# Patient Record
Sex: FEMALE | Race: BLACK OR AFRICAN AMERICAN | Employment: OTHER | URBAN - METROPOLITAN AREA
[De-identification: names, ages, dates, MRNs, and addresses within clinical notes are randomized per-mention and may not be internally consistent; named-entity substitution may affect disease eponyms.]

---

## 2017-02-03 ENCOUNTER — TELEPHONE (OUTPATIENT)
Dept: FAMILY MEDICINE CLINIC | Age: 77
End: 2017-02-03

## 2017-02-03 DIAGNOSIS — Z72.0 TOBACCO ABUSE: Primary | ICD-10-CM

## 2017-02-03 RX ORDER — NICOTINE 21 MG/24HR
1 PATCH, TRANSDERMAL 24 HOURS TRANSDERMAL EVERY 24 HOURS
Qty: 30 PATCH | Refills: 0 | Status: SHIPPED | OUTPATIENT
Start: 2017-02-03 | End: 2017-06-27

## 2017-02-07 ENCOUNTER — HOSPITAL ENCOUNTER (OUTPATIENT)
Dept: WOMENS IMAGING | Age: 77
Discharge: HOME | End: 2017-02-07
Attending: INTERNAL MEDICINE | Admitting: INTERNAL MEDICINE

## 2017-02-07 DIAGNOSIS — Z12.31 SCREENING MAMMOGRAM, ENCOUNTER FOR: ICD-10-CM

## 2017-02-07 DIAGNOSIS — C50.211 MALIGNANT NEOPLASM OF UPPER-INNER QUADRANT OF RIGHT FEMALE BREAST (HCC): ICD-10-CM

## 2017-02-13 ENCOUNTER — HOSPITAL ENCOUNTER (OUTPATIENT)
Dept: ULTRASOUND IMAGING | Age: 77
Discharge: HOME | End: 2017-02-13
Attending: INTERNAL MEDICINE | Admitting: INTERNAL MEDICINE

## 2017-02-13 DIAGNOSIS — R92.8 ABNORMAL MAMMOGRAM: ICD-10-CM

## 2017-06-22 ENCOUNTER — TELEPHONE (OUTPATIENT)
Dept: FAMILY MEDICINE CLINIC | Age: 77
End: 2017-06-22

## 2017-06-27 ENCOUNTER — OFFICE VISIT (OUTPATIENT)
Dept: FAMILY MEDICINE CLINIC | Age: 77
End: 2017-06-27

## 2017-06-27 VITALS
WEIGHT: 109.4 LBS | DIASTOLIC BLOOD PRESSURE: 88 MMHG | HEIGHT: 66 IN | HEART RATE: 76 BPM | SYSTOLIC BLOOD PRESSURE: 132 MMHG | BODY MASS INDEX: 17.58 KG/M2

## 2017-06-27 DIAGNOSIS — I10 ESSENTIAL HYPERTENSION: ICD-10-CM

## 2017-06-27 DIAGNOSIS — F10.10 ALCOHOL ABUSE: ICD-10-CM

## 2017-06-27 DIAGNOSIS — J44.9 MODERATE COPD (CHRONIC OBSTRUCTIVE PULMONARY DISEASE) (HCC): Primary | ICD-10-CM

## 2017-06-27 DIAGNOSIS — R63.6 UNDERWEIGHT DUE TO INADEQUATE CALORIC INTAKE: ICD-10-CM

## 2017-06-27 DIAGNOSIS — R26.81 UNSTEADY GAIT: ICD-10-CM

## 2017-06-27 DIAGNOSIS — Z23 NEED FOR SHINGLES VACCINE: ICD-10-CM

## 2017-06-27 DIAGNOSIS — Z91.81 RISK FOR FALLS: ICD-10-CM

## 2017-06-27 DIAGNOSIS — I73.9 PERIPHERAL VASCULAR DISEASE (HCC): ICD-10-CM

## 2017-06-27 PROCEDURE — 99214 OFFICE O/P EST MOD 30 MIN: CPT | Performed by: FAMILY MEDICINE

## 2017-06-27 RX ORDER — ATORVASTATIN CALCIUM 80 MG/1
80 TABLET, FILM COATED ORAL DAILY
Qty: 90 TABLET | Refills: 1 | Status: SHIPPED | OUTPATIENT
Start: 2017-06-27 | End: 2017-12-20 | Stop reason: SDUPTHER

## 2017-06-27 RX ORDER — CLOPIDOGREL BISULFATE 75 MG/1
75 TABLET ORAL DAILY
Qty: 90 TABLET | Refills: 1 | Status: SHIPPED | OUTPATIENT
Start: 2017-06-27 | End: 2017-12-20 | Stop reason: SDUPTHER

## 2017-06-27 RX ORDER — FOLIC ACID 1 MG/1
1 TABLET ORAL DAILY
Qty: 90 TABLET | Refills: 3 | Status: SHIPPED | OUTPATIENT
Start: 2017-06-27 | End: 2018-05-23 | Stop reason: SDUPTHER

## 2017-06-27 RX ORDER — M-VIT,TX,IRON,MINS/CALC/FOLIC 27MG-0.4MG
1 TABLET ORAL DAILY
Qty: 90 TABLET | Refills: 11 | Status: SHIPPED | OUTPATIENT
Start: 2017-06-27 | End: 2018-07-31 | Stop reason: SDUPTHER

## 2017-06-27 RX ORDER — THIAMINE MONONITRATE (VIT B1) 100 MG
100 TABLET ORAL DAILY
Qty: 90 TABLET | Refills: 3 | Status: SHIPPED | OUTPATIENT
Start: 2017-06-27 | End: 2017-12-20 | Stop reason: SDUPTHER

## 2017-06-27 ASSESSMENT — ENCOUNTER SYMPTOMS: SHORTNESS OF BREATH: 1

## 2017-06-27 ASSESSMENT — COPD QUESTIONNAIRES: COPD: 1

## 2017-10-06 ENCOUNTER — HOSPITAL ENCOUNTER (OUTPATIENT)
Dept: ULTRASOUND IMAGING | Age: 77
Discharge: HOME | End: 2017-10-06
Attending: INTERNAL MEDICINE | Admitting: INTERNAL MEDICINE

## 2017-10-06 DIAGNOSIS — C50.211 MALIGNANT NEOPLASM OF UPPER-INNER QUADRANT OF RIGHT FEMALE BREAST (HCC): ICD-10-CM

## 2017-10-20 DIAGNOSIS — J44.9 MODERATE COPD (CHRONIC OBSTRUCTIVE PULMONARY DISEASE) (HCC): ICD-10-CM

## 2017-12-04 ENCOUNTER — TELEPHONE (OUTPATIENT)
Dept: FAMILY MEDICINE CLINIC | Age: 77
End: 2017-12-04

## 2017-12-06 NOTE — TELEPHONE ENCOUNTER
Patient left message on Voice mail inquiring statis of letter stating she has rods in left shoulder and stint

## 2017-12-20 ENCOUNTER — OFFICE VISIT (OUTPATIENT)
Dept: FAMILY MEDICINE CLINIC | Age: 77
End: 2017-12-20

## 2017-12-20 VITALS
HEART RATE: 86 BPM | DIASTOLIC BLOOD PRESSURE: 72 MMHG | SYSTOLIC BLOOD PRESSURE: 118 MMHG | WEIGHT: 107 LBS | RESPIRATION RATE: 14 BRPM | BODY MASS INDEX: 17.27 KG/M2

## 2017-12-20 DIAGNOSIS — Z72.0 TOBACCO ABUSE: ICD-10-CM

## 2017-12-20 DIAGNOSIS — R63.6 UNDERWEIGHT: ICD-10-CM

## 2017-12-20 DIAGNOSIS — J44.9 MODERATE COPD (CHRONIC OBSTRUCTIVE PULMONARY DISEASE) (HCC): Primary | ICD-10-CM

## 2017-12-20 DIAGNOSIS — F10.10 ALCOHOL ABUSE: ICD-10-CM

## 2017-12-20 DIAGNOSIS — I73.9 PERIPHERAL VASCULAR DISEASE (HCC): ICD-10-CM

## 2017-12-20 PROBLEM — Z80.3 FAMILY HISTORY OF BREAST CANCER: Status: ACTIVE | Noted: 2017-12-20

## 2017-12-20 PROBLEM — Z80.3 FAMILY HISTORY OF BREAST CANCER: Status: RESOLVED | Noted: 2017-12-20 | Resolved: 2017-12-20

## 2017-12-20 PROBLEM — Z85.3 HISTORY OF BREAST CANCER: Status: ACTIVE | Noted: 2017-12-20

## 2017-12-20 PROCEDURE — G8484 FLU IMMUNIZE NO ADMIN: HCPCS | Performed by: FAMILY MEDICINE

## 2017-12-20 PROCEDURE — 3023F SPIROM DOC REV: CPT | Performed by: FAMILY MEDICINE

## 2017-12-20 PROCEDURE — 1090F PRES/ABSN URINE INCON ASSESS: CPT | Performed by: FAMILY MEDICINE

## 2017-12-20 PROCEDURE — G8427 DOCREV CUR MEDS BY ELIG CLIN: HCPCS | Performed by: FAMILY MEDICINE

## 2017-12-20 PROCEDURE — 1036F TOBACCO NON-USER: CPT | Performed by: FAMILY MEDICINE

## 2017-12-20 PROCEDURE — G8418 CALC BMI BLW LOW PARAM F/U: HCPCS | Performed by: FAMILY MEDICINE

## 2017-12-20 PROCEDURE — 4040F PNEUMOC VAC/ADMIN/RCVD: CPT | Performed by: FAMILY MEDICINE

## 2017-12-20 PROCEDURE — G8926 SPIRO NO PERF OR DOC: HCPCS | Performed by: FAMILY MEDICINE

## 2017-12-20 PROCEDURE — 99214 OFFICE O/P EST MOD 30 MIN: CPT | Performed by: FAMILY MEDICINE

## 2017-12-20 PROCEDURE — G8399 PT W/DXA RESULTS DOCUMENT: HCPCS | Performed by: FAMILY MEDICINE

## 2017-12-20 PROCEDURE — 1123F ACP DISCUSS/DSCN MKR DOCD: CPT | Performed by: FAMILY MEDICINE

## 2017-12-20 RX ORDER — CLOPIDOGREL BISULFATE 75 MG/1
75 TABLET ORAL DAILY
Qty: 90 TABLET | Refills: 1 | Status: SHIPPED | OUTPATIENT
Start: 2017-12-20 | End: 2018-05-23 | Stop reason: SDUPTHER

## 2017-12-20 RX ORDER — THIAMINE MONONITRATE (VIT B1) 100 MG
100 TABLET ORAL DAILY
Qty: 90 TABLET | Refills: 3 | Status: SHIPPED | OUTPATIENT
Start: 2017-12-20 | End: 2018-11-14 | Stop reason: SDUPTHER

## 2017-12-20 RX ORDER — FLUTICASONE PROPIONATE 220 UG/1
2 AEROSOL, METERED RESPIRATORY (INHALATION) 2 TIMES DAILY
Qty: 3 INHALER | Refills: 3 | Status: SHIPPED | OUTPATIENT
Start: 2017-12-20 | End: 2018-11-14 | Stop reason: SDUPTHER

## 2017-12-20 RX ORDER — ATORVASTATIN CALCIUM 80 MG/1
80 TABLET, FILM COATED ORAL DAILY
Qty: 90 TABLET | Refills: 1 | Status: SHIPPED | OUTPATIENT
Start: 2017-12-20 | End: 2018-05-23 | Stop reason: SDUPTHER

## 2017-12-20 RX ORDER — INFLUENZA A VIRUS A/MICHIGAN/45/2015 X-275 (H1N1) ANTIGEN (FORMALDEHYDE INACTIVATED), INFLUENZA A VIRUS A/SINGAPORE/INFIMH-16-0019/2016 IVR-186 (H3N2) ANTIGEN (FORMALDEHYDE INACTIVATED), AND INFLUENZA B VIRUS B/MARYLAND/15/2016 BX-69A (A B/COLORADO/6/2017-LIKE VIRUS) ANTIGEN (FORMALDEHYDE INACTIVATED) 60; 60; 60 UG/.5ML; UG/.5ML; UG/.5ML
INJECTION, SUSPENSION INTRAMUSCULAR
Refills: 0 | COMMUNITY
Start: 2017-11-14 | End: 2018-11-15 | Stop reason: ALTCHOICE

## 2017-12-20 RX ORDER — NICOTINE 21 MG/24HR
1 PATCH, TRANSDERMAL 24 HOURS TRANSDERMAL EVERY 24 HOURS
Qty: 30 PATCH | Refills: 2 | Status: SHIPPED | OUTPATIENT
Start: 2017-12-20 | End: 2019-05-14

## 2017-12-20 ASSESSMENT — ENCOUNTER SYMPTOMS: SHORTNESS OF BREATH: 1

## 2017-12-20 ASSESSMENT — COPD QUESTIONNAIRES: COPD: 1

## 2017-12-20 NOTE — PATIENT INSTRUCTIONS
Patient Education        Stopping Smoking: Care Instructions  Your Care Instructions    Cigarette smokers crave the nicotine in cigarettes. Giving it up is much harder than simply changing a habit. Your body has to stop craving the nicotine. It is hard to quit, but you can do it. There are many tools that people use to quit smoking. You may find that combining tools works best for you. There are several steps to quitting. First you get ready to quit. Then you get support to help you. After that, you learn new skills and behaviors to become a nonsmoker. For many people, a necessary step is getting and using medicine. Your doctor will help you set up the plan that best meets your needs. You may want to attend a smoking cessation program to help you quit smoking. When you choose a program, look for one that has proven success. Ask your doctor for ideas. You will greatly increase your chances of success if you take medicine as well as get counseling or join a cessation program.  Some of the changes you feel when you first quit tobacco are uncomfortable. Your body will miss the nicotine at first, and you may feel short-tempered and grumpy. You may have trouble sleeping or concentrating. Medicine can help you deal with these symptoms. You may struggle with changing your smoking habits and rituals. The last step is the tricky one: Be prepared for the smoking urge to continue for a time. This is a lot to deal with, but keep at it. You will feel better. Follow-up care is a key part of your treatment and safety. Be sure to make and go to all appointments, and call your doctor if you are having problems. It's also a good idea to know your test results and keep a list of the medicines you take. How can you care for yourself at home? · Ask your family, friends, and coworkers for support. You have a better chance of quitting if you have help and support.   · Join a support group, such as Nicotine Anonymous, for people who get mad at yourself if you smoke again. Make a list of things you learned and think about when you want to try again, such as next week, next month, or next year. Where can you learn more? Go to https://Easy Voy[employer].Houzz. org and sign in to your [facility] account. Enter P612 in the MyDROBE box to learn more about \"Stopping Smoking: Care Instructions. \"     If you do not have an account, please click on the \"Sign Up Now\" link. Current as of: March 20, 2017  Content Version: 11.4  © 8390-7952 [employer], [employer]. Care instructions adapted under license by [facility] ([facility]). If you have questions about a medical condition or this instruction, always ask your healthcare professional. [address] any warranty or liability for your use of this information.

## 2018-02-12 ENCOUNTER — HOSPITAL ENCOUNTER (OUTPATIENT)
Dept: WOMENS IMAGING | Age: 78
Discharge: HOME | End: 2018-02-12
Attending: INTERNAL MEDICINE | Admitting: INTERNAL MEDICINE

## 2018-02-12 DIAGNOSIS — Z78.0 ASYMPTOMATIC AGE-RELATED POSTMENOPAUSAL STATE: ICD-10-CM

## 2018-02-12 DIAGNOSIS — C50.919 MALIGNANT NEOPLASM OF FEMALE BREAST, UNSPECIFIED ESTROGEN RECEPTOR STATUS, UNSPECIFIED LATERALITY, UNSPECIFIED SITE OF BREAST (HCC): ICD-10-CM

## 2018-04-16 ENCOUNTER — TELEPHONE (OUTPATIENT)
Dept: FAMILY MEDICINE CLINIC | Age: 78
End: 2018-04-16

## 2018-05-23 ENCOUNTER — OFFICE VISIT (OUTPATIENT)
Dept: FAMILY MEDICINE CLINIC | Age: 78
End: 2018-05-23

## 2018-05-23 VITALS
DIASTOLIC BLOOD PRESSURE: 78 MMHG | SYSTOLIC BLOOD PRESSURE: 100 MMHG | BODY MASS INDEX: 17.16 KG/M2 | WEIGHT: 106.8 LBS | HEIGHT: 66 IN | HEART RATE: 89 BPM

## 2018-05-23 DIAGNOSIS — F10.10 ALCOHOL ABUSE: ICD-10-CM

## 2018-05-23 DIAGNOSIS — R26.81 UNSTEADY GAIT: ICD-10-CM

## 2018-05-23 DIAGNOSIS — J44.9 MODERATE COPD (CHRONIC OBSTRUCTIVE PULMONARY DISEASE) (HCC): ICD-10-CM

## 2018-05-23 DIAGNOSIS — I73.9 PERIPHERAL VASCULAR DISEASE (HCC): Primary | ICD-10-CM

## 2018-05-23 PROCEDURE — 1090F PRES/ABSN URINE INCON ASSESS: CPT | Performed by: FAMILY MEDICINE

## 2018-05-23 PROCEDURE — 4040F PNEUMOC VAC/ADMIN/RCVD: CPT | Performed by: FAMILY MEDICINE

## 2018-05-23 PROCEDURE — G8418 CALC BMI BLW LOW PARAM F/U: HCPCS | Performed by: FAMILY MEDICINE

## 2018-05-23 PROCEDURE — G8926 SPIRO NO PERF OR DOC: HCPCS | Performed by: FAMILY MEDICINE

## 2018-05-23 PROCEDURE — 1123F ACP DISCUSS/DSCN MKR DOCD: CPT | Performed by: FAMILY MEDICINE

## 2018-05-23 PROCEDURE — 99214 OFFICE O/P EST MOD 30 MIN: CPT | Performed by: FAMILY MEDICINE

## 2018-05-23 PROCEDURE — G8399 PT W/DXA RESULTS DOCUMENT: HCPCS | Performed by: FAMILY MEDICINE

## 2018-05-23 PROCEDURE — 1036F TOBACCO NON-USER: CPT | Performed by: FAMILY MEDICINE

## 2018-05-23 PROCEDURE — G8427 DOCREV CUR MEDS BY ELIG CLIN: HCPCS | Performed by: FAMILY MEDICINE

## 2018-05-23 PROCEDURE — 3023F SPIROM DOC REV: CPT | Performed by: FAMILY MEDICINE

## 2018-05-23 RX ORDER — ATORVASTATIN CALCIUM 80 MG/1
80 TABLET, FILM COATED ORAL DAILY
Qty: 90 TABLET | Refills: 1 | Status: SHIPPED | OUTPATIENT
Start: 2018-05-23 | End: 2018-11-14 | Stop reason: SDUPTHER

## 2018-05-23 RX ORDER — FOLIC ACID 1 MG/1
1 TABLET ORAL DAILY
Qty: 90 TABLET | Refills: 3 | Status: SHIPPED | OUTPATIENT
Start: 2018-05-23 | End: 2019-05-14 | Stop reason: SDUPTHER

## 2018-05-23 RX ORDER — CLOPIDOGREL BISULFATE 75 MG/1
75 TABLET ORAL DAILY
Qty: 90 TABLET | Refills: 1 | Status: SHIPPED | OUTPATIENT
Start: 2018-05-23 | End: 2018-11-14 | Stop reason: SDUPTHER

## 2018-05-23 ASSESSMENT — PATIENT HEALTH QUESTIONNAIRE - PHQ9
SUM OF ALL RESPONSES TO PHQ QUESTIONS 1-9: 0
1. LITTLE INTEREST OR PLEASURE IN DOING THINGS: 0
SUM OF ALL RESPONSES TO PHQ9 QUESTIONS 1 & 2: 0
2. FEELING DOWN, DEPRESSED OR HOPELESS: 0

## 2018-05-24 ENCOUNTER — TELEPHONE (OUTPATIENT)
Dept: FAMILY MEDICINE CLINIC | Age: 78
End: 2018-05-24

## 2018-05-24 ASSESSMENT — ENCOUNTER SYMPTOMS: SHORTNESS OF BREATH: 1

## 2018-05-24 ASSESSMENT — COPD QUESTIONNAIRES: COPD: 1

## 2018-05-30 ENCOUNTER — HOSPITAL ENCOUNTER (OUTPATIENT)
Dept: LAB | Age: 78
Discharge: HOME | End: 2018-05-30
Attending: FAMILY MEDICINE | Admitting: FAMILY MEDICINE

## 2018-05-30 LAB
ALBUMIN SERPL-MCNC: 5 GM/DL (ref 3.4–5)
ALP BLD-CCNC: 103 IU/L (ref 40–128)
ALT SERPL-CCNC: 15 U/L (ref 10–40)
ANION GAP SERPL CALCULATED.3IONS-SCNC: 13 MMOL/L (ref 4–16)
AST SERPL-CCNC: 25 IU/L (ref 15–37)
BASOPHILS ABSOLUTE: 0 K/CU MM
BASOPHILS RELATIVE PERCENT: 0.7 % (ref 0–1)
BILIRUB SERPL-MCNC: 0.8 MG/DL (ref 0–1)
BUN BLDV-MCNC: 7 MG/DL (ref 6–23)
CALCIUM SERPL-MCNC: 10 MG/DL (ref 8.3–10.6)
CHLORIDE BLD-SCNC: 91 MMOL/L (ref 99–110)
CO2: 25 MMOL/L (ref 21–32)
CREAT SERPL-MCNC: 0.7 MG/DL (ref 0.6–1.1)
DIFFERENTIAL TYPE: ABNORMAL
EOSINOPHILS ABSOLUTE: 0.1 K/CU MM
EOSINOPHILS RELATIVE PERCENT: 2 % (ref 0–3)
GFR AFRICAN AMERICAN: >60 ML/MIN/1.73M2
GFR NON-AFRICAN AMERICAN: >60 ML/MIN/1.73M2
GLUCOSE BLD-MCNC: 84 MG/DL (ref 70–99)
HCT VFR BLD CALC: 49.4 % (ref 37–47)
HEMOGLOBIN: 16.5 GM/DL (ref 12.5–16)
IMMATURE NEUTROPHIL %: 0.4 % (ref 0–0.43)
LYMPHOCYTES ABSOLUTE: 1.2 K/CU MM
LYMPHOCYTES RELATIVE PERCENT: 26.7 % (ref 24–44)
MCH RBC QN AUTO: 31.6 PG (ref 27–31)
MCHC RBC AUTO-ENTMCNC: 33.4 % (ref 32–36)
MCV RBC AUTO: 94.6 FL (ref 78–100)
MONOCYTES ABSOLUTE: 0.6 K/CU MM
MONOCYTES RELATIVE PERCENT: 12.8 % (ref 0–4)
NUCLEATED RBC %: 0 %
PDW BLD-RTO: 13.9 % (ref 11.7–14.9)
PLATELET # BLD: 280 K/CU MM (ref 140–440)
PMV BLD AUTO: 8.4 FL (ref 7.5–11.1)
POTASSIUM SERPL-SCNC: 5.4 MMOL/L (ref 3.5–5.1)
RBC # BLD: 5.22 M/CU MM (ref 4.2–5.4)
SEGMENTED NEUTROPHILS ABSOLUTE COUNT: 2.7 K/CU MM
SEGMENTED NEUTROPHILS RELATIVE PERCENT: 57.4 % (ref 36–66)
SODIUM BLD-SCNC: 129 MMOL/L (ref 135–145)
TOTAL IMMATURE NEUTOROPHIL: 0.02 K/CU MM
TOTAL NUCLEATED RBC: 0 K/CU MM
TOTAL PROTEIN: 7.5 GM/DL (ref 6.4–8.2)
WBC # BLD: 4.6 K/CU MM (ref 4–10.5)

## 2018-06-01 ENCOUNTER — TELEPHONE (OUTPATIENT)
Dept: FAMILY MEDICINE CLINIC | Age: 78
End: 2018-06-01

## 2018-06-01 DIAGNOSIS — E87.5 HIGH POTASSIUM: Primary | ICD-10-CM

## 2018-06-01 DIAGNOSIS — E87.1 HYPONATREMIA: ICD-10-CM

## 2018-06-07 ENCOUNTER — HOSPITAL ENCOUNTER (OUTPATIENT)
Dept: GENERAL RADIOLOGY | Age: 78
Discharge: HOME | End: 2018-06-07
Attending: FAMILY MEDICINE | Admitting: FAMILY MEDICINE

## 2018-06-07 LAB
BASOPHILS ABSOLUTE: 0 K/CU MM
BASOPHILS RELATIVE PERCENT: 0.6 % (ref 0–1)
DIFFERENTIAL TYPE: ABNORMAL
EOSINOPHILS ABSOLUTE: 0.1 K/CU MM
EOSINOPHILS RELATIVE PERCENT: 2.4 % (ref 0–3)
HCT VFR BLD CALC: 44.9 % (ref 37–47)
HEMOGLOBIN: 14.2 GM/DL (ref 12.5–16)
IMMATURE NEUTROPHIL %: 0.6 % (ref 0–0.43)
LYMPHOCYTES ABSOLUTE: 1.3 K/CU MM
LYMPHOCYTES RELATIVE PERCENT: 26.1 % (ref 24–44)
MCH RBC QN AUTO: 32.1 PG (ref 27–31)
MCHC RBC AUTO-ENTMCNC: 31.6 % (ref 32–36)
MCV RBC AUTO: 101.4 FL (ref 78–100)
MONOCYTES ABSOLUTE: 0.7 K/CU MM
MONOCYTES RELATIVE PERCENT: 14.2 % (ref 0–4)
NUCLEATED RBC %: 0 %
PDW BLD-RTO: 14.2 % (ref 11.7–14.9)
PLATELET # BLD: 300 K/CU MM (ref 140–440)
PMV BLD AUTO: 8.4 FL (ref 7.5–11.1)
RBC # BLD: 4.43 M/CU MM (ref 4.2–5.4)
SEGMENTED NEUTROPHILS ABSOLUTE COUNT: 2.8 K/CU MM
SEGMENTED NEUTROPHILS RELATIVE PERCENT: 56.1 % (ref 36–66)
TOTAL IMMATURE NEUTOROPHIL: 0.03 K/CU MM
TOTAL NUCLEATED RBC: 0 K/CU MM
WBC # BLD: 5.1 K/CU MM (ref 4–10.5)

## 2018-07-30 ENCOUNTER — TELEPHONE (OUTPATIENT)
Dept: FAMILY MEDICINE CLINIC | Age: 78
End: 2018-07-30

## 2018-07-30 NOTE — TELEPHONE ENCOUNTER
Patient requesting refill Multivitamin Tablets [facility] to [facility]

## 2018-07-31 DIAGNOSIS — F10.10 ALCOHOL ABUSE: ICD-10-CM

## 2018-07-31 RX ORDER — M-VIT,TX,IRON,MINS/CALC/FOLIC 27MG-0.4MG
1 TABLET ORAL DAILY
Qty: 90 TABLET | Refills: 3 | Status: SHIPPED | OUTPATIENT
Start: 2018-07-31 | End: 2018-11-14 | Stop reason: SDUPTHER

## 2018-08-15 ENCOUNTER — HOSPITAL ENCOUNTER (OUTPATIENT)
Dept: OTHER | Age: 78
Discharge: HOME | End: 2018-08-15
Attending: INTERNAL MEDICINE | Admitting: INTERNAL MEDICINE

## 2018-08-15 LAB
ALBUMIN SERPL-MCNC: 4.7 GM/DL (ref 3.4–5)
ALP BLD-CCNC: 91 IU/L (ref 40–129)
ALT SERPL-CCNC: 14 U/L (ref 10–40)
ANION GAP SERPL CALCULATED.3IONS-SCNC: 16 MMOL/L (ref 4–16)
AST SERPL-CCNC: 21 IU/L (ref 15–37)
BILIRUB SERPL-MCNC: 0.5 MG/DL (ref 0–1)
BUN BLDV-MCNC: 6 MG/DL (ref 6–23)
CALCIUM SERPL-MCNC: 10.3 MG/DL (ref 8.3–10.6)
CHLORIDE BLD-SCNC: 94 MMOL/L (ref 99–110)
CO2: 24 MMOL/L (ref 21–32)
CREAT SERPL-MCNC: 0.7 MG/DL (ref 0.6–1.1)
GFR AFRICAN AMERICAN: >60 ML/MIN/1.73M2
GFR NON-AFRICAN AMERICAN: >60 ML/MIN/1.73M2
GLUCOSE BLD-MCNC: 62 MG/DL (ref 70–99)
POTASSIUM SERPL-SCNC: 4.9 MMOL/L (ref 3.5–5.1)
SODIUM BLD-SCNC: 134 MMOL/L (ref 135–145)
TOTAL PROTEIN: 6.9 GM/DL (ref 6.4–8.2)

## 2018-10-02 ENCOUNTER — NURSE ONLY (OUTPATIENT)
Dept: FAMILY MEDICINE CLINIC | Age: 78
End: 2018-10-02
Payer: COMMERCIAL

## 2018-10-02 DIAGNOSIS — Z23 NEED FOR INFLUENZA VACCINATION: Primary | ICD-10-CM

## 2018-10-02 PROCEDURE — G0008 ADMIN INFLUENZA VIRUS VAC: HCPCS | Performed by: FAMILY MEDICINE

## 2018-10-02 PROCEDURE — 90662 IIV NO PRSV INCREASED AG IM: CPT | Performed by: FAMILY MEDICINE

## 2018-11-14 ENCOUNTER — OFFICE VISIT (OUTPATIENT)
Dept: FAMILY MEDICINE CLINIC | Age: 78
End: 2018-11-14
Payer: COMMERCIAL

## 2018-11-14 VITALS
HEART RATE: 81 BPM | SYSTOLIC BLOOD PRESSURE: 128 MMHG | WEIGHT: 96 LBS | HEIGHT: 64 IN | DIASTOLIC BLOOD PRESSURE: 80 MMHG | BODY MASS INDEX: 16.39 KG/M2

## 2018-11-14 DIAGNOSIS — R26.9 ABNORMAL GAIT: Primary | ICD-10-CM

## 2018-11-14 DIAGNOSIS — F10.10 ALCOHOL ABUSE: ICD-10-CM

## 2018-11-14 DIAGNOSIS — I73.9 PERIPHERAL VASCULAR DISEASE (HCC): ICD-10-CM

## 2018-11-14 DIAGNOSIS — J44.9 MODERATE COPD (CHRONIC OBSTRUCTIVE PULMONARY DISEASE) (HCC): ICD-10-CM

## 2018-11-14 PROCEDURE — G8926 SPIRO NO PERF OR DOC: HCPCS | Performed by: FAMILY MEDICINE

## 2018-11-14 PROCEDURE — G8427 DOCREV CUR MEDS BY ELIG CLIN: HCPCS | Performed by: FAMILY MEDICINE

## 2018-11-14 PROCEDURE — 1123F ACP DISCUSS/DSCN MKR DOCD: CPT | Performed by: FAMILY MEDICINE

## 2018-11-14 PROCEDURE — 4040F PNEUMOC VAC/ADMIN/RCVD: CPT | Performed by: FAMILY MEDICINE

## 2018-11-14 PROCEDURE — G8399 PT W/DXA RESULTS DOCUMENT: HCPCS | Performed by: FAMILY MEDICINE

## 2018-11-14 PROCEDURE — 99213 OFFICE O/P EST LOW 20 MIN: CPT | Performed by: FAMILY MEDICINE

## 2018-11-14 PROCEDURE — 1090F PRES/ABSN URINE INCON ASSESS: CPT | Performed by: FAMILY MEDICINE

## 2018-11-14 PROCEDURE — 1101F PT FALLS ASSESS-DOCD LE1/YR: CPT | Performed by: FAMILY MEDICINE

## 2018-11-14 PROCEDURE — 3023F SPIROM DOC REV: CPT | Performed by: FAMILY MEDICINE

## 2018-11-14 PROCEDURE — G8482 FLU IMMUNIZE ORDER/ADMIN: HCPCS | Performed by: FAMILY MEDICINE

## 2018-11-14 PROCEDURE — 1036F TOBACCO NON-USER: CPT | Performed by: FAMILY MEDICINE

## 2018-11-14 PROCEDURE — G8419 CALC BMI OUT NRM PARAM NOF/U: HCPCS | Performed by: FAMILY MEDICINE

## 2018-11-14 RX ORDER — M-VIT,TX,IRON,MINS/CALC/FOLIC 27MG-0.4MG
1 TABLET ORAL DAILY
Qty: 90 TABLET | Refills: 3 | Status: SHIPPED | OUTPATIENT
Start: 2018-11-14 | End: 2020-11-09 | Stop reason: SDUPTHER

## 2018-11-14 RX ORDER — FLUTICASONE PROPIONATE 220 UG/1
2 AEROSOL, METERED RESPIRATORY (INHALATION) 2 TIMES DAILY
Qty: 3 INHALER | Refills: 3 | Status: SHIPPED | OUTPATIENT
Start: 2018-11-14 | End: 2019-04-23 | Stop reason: ALTCHOICE

## 2018-11-14 RX ORDER — THIAMINE MONONITRATE (VIT B1) 100 MG
100 TABLET ORAL DAILY
Qty: 90 TABLET | Refills: 3 | Status: SHIPPED | OUTPATIENT
Start: 2018-11-14 | End: 2019-11-05 | Stop reason: SDUPTHER

## 2018-11-14 RX ORDER — CLOPIDOGREL BISULFATE 75 MG/1
75 TABLET ORAL DAILY
Qty: 90 TABLET | Refills: 1 | Status: SHIPPED | OUTPATIENT
Start: 2018-11-14 | End: 2019-05-14 | Stop reason: SDUPTHER

## 2018-11-14 RX ORDER — ATORVASTATIN CALCIUM 80 MG/1
80 TABLET, FILM COATED ORAL DAILY
Qty: 90 TABLET | Refills: 1 | Status: SHIPPED | OUTPATIENT
Start: 2018-11-14 | End: 2019-05-14 | Stop reason: SDUPTHER

## 2018-11-15 ASSESSMENT — COPD QUESTIONNAIRES: COPD: 1

## 2018-11-15 ASSESSMENT — ENCOUNTER SYMPTOMS
SHORTNESS OF BREATH: 0
CHEST TIGHTNESS: 0

## 2019-04-01 ENCOUNTER — OFFICE VISIT (OUTPATIENT)
Dept: FAMILY MEDICINE CLINIC | Age: 79
End: 2019-04-01
Payer: COMMERCIAL

## 2019-04-01 VITALS
DIASTOLIC BLOOD PRESSURE: 80 MMHG | HEIGHT: 64 IN | BODY MASS INDEX: 16.18 KG/M2 | SYSTOLIC BLOOD PRESSURE: 100 MMHG | WEIGHT: 94.8 LBS | HEART RATE: 80 BPM | TEMPERATURE: 97.8 F

## 2019-04-01 DIAGNOSIS — J20.9 ACUTE BRONCHITIS, UNSPECIFIED ORGANISM: Primary | ICD-10-CM

## 2019-04-01 PROCEDURE — 1090F PRES/ABSN URINE INCON ASSESS: CPT | Performed by: FAMILY MEDICINE

## 2019-04-01 PROCEDURE — G8427 DOCREV CUR MEDS BY ELIG CLIN: HCPCS | Performed by: FAMILY MEDICINE

## 2019-04-01 PROCEDURE — 4040F PNEUMOC VAC/ADMIN/RCVD: CPT | Performed by: FAMILY MEDICINE

## 2019-04-01 PROCEDURE — G8419 CALC BMI OUT NRM PARAM NOF/U: HCPCS | Performed by: FAMILY MEDICINE

## 2019-04-01 PROCEDURE — G8399 PT W/DXA RESULTS DOCUMENT: HCPCS | Performed by: FAMILY MEDICINE

## 2019-04-01 PROCEDURE — 1036F TOBACCO NON-USER: CPT | Performed by: FAMILY MEDICINE

## 2019-04-01 PROCEDURE — 99213 OFFICE O/P EST LOW 20 MIN: CPT | Performed by: FAMILY MEDICINE

## 2019-04-01 PROCEDURE — 1123F ACP DISCUSS/DSCN MKR DOCD: CPT | Performed by: FAMILY MEDICINE

## 2019-04-01 RX ORDER — AZITHROMYCIN 250 MG/1
250 TABLET, FILM COATED ORAL SEE ADMIN INSTRUCTIONS
Qty: 6 TABLET | Refills: 0 | Status: SHIPPED | OUTPATIENT
Start: 2019-04-01 | End: 2019-04-06

## 2019-04-01 NOTE — PROGRESS NOTES
Brother     Heart Attack Brother          age 76    Heart Disease Brother     Early Death Brother     Other Sister         \"Both Legs Amputated\"    Kidney Disease Sister         \"Kidney Problems\"    Early Death Brother         Suicide- hung himself    Early Death Brother         Murdered in his 42's    Early Death Son 1         in a fire    Early Death Mother 61    Vision Loss Sister         \"Eye Problems\"    Other Daughter         \"Lung Problems\"       Current Outpatient Medications on File Prior to Visit   Medication Sig Dispense Refill    Multiple Vitamins-Minerals (THERAPEUTIC MULTIVITAMIN-MINERALS) tablet Take 1 tablet by mouth daily 90 tablet 3    atorvastatin (LIPITOR) 80 MG tablet Take 1 tablet by mouth daily 90 tablet 1    clopidogrel (PLAVIX) 75 MG tablet Take 1 tablet by mouth daily 90 tablet 1    albuterol-ipratropium (COMBIVENT RESPIMAT)  MCG/ACT AERS inhaler Inhale 1 puff into the lungs every 6 hours as needed for Wheezing 4 Inhaler 3    fluticasone (FLOVENT HFA) 220 MCG/ACT inhaler Inhale 2 puffs into the lungs 2 times daily 3 Inhaler 3    vitamin B-1 (THIAMINE) 100 MG tablet Take 1 tablet by mouth daily 90 tablet 3    folic acid (FOLVITE) 1 MG tablet Take 1 tablet by mouth daily 90 tablet 3    nicotine (NICODERM CQ) 14 MG/24HR Place 1 patch onto the skin every 24 hours 30 patch 2    UNABLE TO FIND Shingles vaccine 1 Units 0    calcium carbonate-vitamin D (CALCIUM 600 + D) 600-400 MG-UNIT TABS per tab Twice daily by mouth 60 tablet 11    aspirin 325 MG tablet Take 325 mg by mouth every morning. Over The Counter , Last Dose Taken 12 Due To Scheduled Procedure      OXYGEN by Nasal route nightly. Oxygen 2 liters per minute       No current facility-administered medications on file prior to visit. Objective:   Physical Exam   Constitutional: She appears well-developed and well-nourished. No distress. HENT:   Head: Normocephalic and atraumatic. Right Ear: Hearing normal.   Left Ear: Hearing normal.   Nose: Nose normal. No mucosal edema, rhinorrhea, nose lacerations, sinus tenderness or nasal deformity. Right sinus exhibits no maxillary sinus tenderness and no frontal sinus tenderness. Left sinus exhibits no maxillary sinus tenderness and no frontal sinus tenderness. Mouth/Throat: Oropharynx is clear and moist and mucous membranes are normal. No oropharyngeal exudate, posterior oropharyngeal edema or posterior oropharyngeal erythema. Tympanic membranes blocked by bilateral cerumen occlusion   Eyes: Conjunctivae are normal.   Neck: No tracheal deviation present. No thyromegaly present. Cardiovascular: Normal rate, regular rhythm, S1 normal, S2 normal and normal heart sounds. Exam reveals no gallop and no friction rub. Pulmonary/Chest: No respiratory distress. She has no wheezes. She has no rales. Lymphadenopathy:        Head (right side): No submental, no submandibular and no posterior auricular adenopathy present. Head (left side): No submental, no submandibular and no posterior auricular adenopathy present. Right cervical: No superficial cervical, no deep cervical and no posterior cervical adenopathy present. Left cervical: No superficial cervical, no deep cervical and no posterior cervical adenopathy present. Skin: Skin is warm, dry and intact. Psychiatric: She has a normal mood and affect. Her behavior is normal.   Nursing note and vitals reviewed. Body mass index is 16.27 kg/m². Wt Readings from Last 3 Encounters:   04/01/19 94 lb 12.8 oz (43 kg)   11/14/18 96 lb (43.5 kg)   08/16/18 102 lb (46.3 kg)     BP Readings from Last 3 Encounters:   04/01/19 100/80   11/14/18 128/80   08/16/18 116/70          No results found for this visit on 04/01/19. Assessment:       Diagnosis Orders   1.  Acute bronchitis, unspecified organism  azithromycin (ZITHROMAX) 250 MG tablet           Plan:         Lots of

## 2019-04-23 DIAGNOSIS — J44.9 MODERATE COPD (CHRONIC OBSTRUCTIVE PULMONARY DISEASE) (HCC): Primary | ICD-10-CM

## 2019-04-23 NOTE — TELEPHONE ENCOUNTER
Patients granddaughter stated that patient is using her inhalers more frequent due to not being able to breath. Patient is out for flovent and combivent respimat inhalers and not able to fill due to being too early. Patients granddaughter stated patient is not using correctly and not getting full dosages. Would patient benifit with an aero chamber? Patients grand daughter feels she would and can use. Do you have any other suggestions. And can you call in another inhaler. Please advise.

## 2019-04-23 NOTE — PROGRESS NOTES
I've sent the flovent discus to replace the flovent inhaler. This should help. Will send in spacer as well.     Check to see if pharmacy can do early fill on the combivent--and can a spacer be added on

## 2019-04-24 DIAGNOSIS — J44.9 MODERATE COPD (CHRONIC OBSTRUCTIVE PULMONARY DISEASE) (HCC): ICD-10-CM

## 2019-04-24 NOTE — TELEPHONE ENCOUNTER
Per Dr Maier Bound  Progress Notes        I've sent the flovent discus to replace the flovent inhaler. This should help. Will send in spacer as well.     Check to see if pharmacy can do early fill on the combivent--and can a spacer be added on              Spoke to pharmacist, notified that the  flovent discus is to replace the flovent inhaler. Pharmacist stated the space can be added on to Combivent and will try to fill on early.

## 2019-05-14 ENCOUNTER — OFFICE VISIT (OUTPATIENT)
Dept: FAMILY MEDICINE CLINIC | Age: 79
End: 2019-05-14
Payer: COMMERCIAL

## 2019-05-14 VITALS
HEIGHT: 64 IN | SYSTOLIC BLOOD PRESSURE: 120 MMHG | DIASTOLIC BLOOD PRESSURE: 80 MMHG | OXYGEN SATURATION: 99 % | WEIGHT: 93.8 LBS | BODY MASS INDEX: 16.01 KG/M2 | HEART RATE: 55 BPM

## 2019-05-14 DIAGNOSIS — Z72.0 TOBACCO ABUSE: ICD-10-CM

## 2019-05-14 DIAGNOSIS — F10.10 ALCOHOL ABUSE: ICD-10-CM

## 2019-05-14 DIAGNOSIS — J44.9 MODERATE COPD (CHRONIC OBSTRUCTIVE PULMONARY DISEASE) (HCC): Primary | ICD-10-CM

## 2019-05-14 DIAGNOSIS — R64 CACHEXIA (HCC): ICD-10-CM

## 2019-05-14 DIAGNOSIS — I73.9 PERIPHERAL VASCULAR DISEASE (HCC): ICD-10-CM

## 2019-05-14 PROCEDURE — G8419 CALC BMI OUT NRM PARAM NOF/U: HCPCS | Performed by: FAMILY MEDICINE

## 2019-05-14 PROCEDURE — G8399 PT W/DXA RESULTS DOCUMENT: HCPCS | Performed by: FAMILY MEDICINE

## 2019-05-14 PROCEDURE — 99214 OFFICE O/P EST MOD 30 MIN: CPT | Performed by: FAMILY MEDICINE

## 2019-05-14 PROCEDURE — 3023F SPIROM DOC REV: CPT | Performed by: FAMILY MEDICINE

## 2019-05-14 PROCEDURE — 4040F PNEUMOC VAC/ADMIN/RCVD: CPT | Performed by: FAMILY MEDICINE

## 2019-05-14 PROCEDURE — G8926 SPIRO NO PERF OR DOC: HCPCS | Performed by: FAMILY MEDICINE

## 2019-05-14 PROCEDURE — 1090F PRES/ABSN URINE INCON ASSESS: CPT | Performed by: FAMILY MEDICINE

## 2019-05-14 PROCEDURE — 4004F PT TOBACCO SCREEN RCVD TLK: CPT | Performed by: FAMILY MEDICINE

## 2019-05-14 PROCEDURE — 1123F ACP DISCUSS/DSCN MKR DOCD: CPT | Performed by: FAMILY MEDICINE

## 2019-05-14 PROCEDURE — G8427 DOCREV CUR MEDS BY ELIG CLIN: HCPCS | Performed by: FAMILY MEDICINE

## 2019-05-14 RX ORDER — CLOPIDOGREL BISULFATE 75 MG/1
75 TABLET ORAL DAILY
Qty: 90 TABLET | Refills: 1 | Status: SHIPPED | OUTPATIENT
Start: 2019-05-14 | End: 2019-11-05 | Stop reason: SDUPTHER

## 2019-05-14 RX ORDER — ATORVASTATIN CALCIUM 80 MG/1
80 TABLET, FILM COATED ORAL DAILY
Qty: 90 TABLET | Refills: 1 | Status: SHIPPED | OUTPATIENT
Start: 2019-05-14 | End: 2019-11-05 | Stop reason: SDUPTHER

## 2019-05-14 RX ORDER — FOLIC ACID 1 MG/1
1 TABLET ORAL DAILY
Qty: 90 TABLET | Refills: 3 | Status: SHIPPED | OUTPATIENT
Start: 2019-05-14 | End: 2020-07-09 | Stop reason: SDUPTHER

## 2019-05-14 ASSESSMENT — PATIENT HEALTH QUESTIONNAIRE - PHQ9
2. FEELING DOWN, DEPRESSED OR HOPELESS: 0
SUM OF ALL RESPONSES TO PHQ QUESTIONS 1-9: 0
SUM OF ALL RESPONSES TO PHQ9 QUESTIONS 1 & 2: 0
SUM OF ALL RESPONSES TO PHQ QUESTIONS 1-9: 0
1. LITTLE INTEREST OR PLEASURE IN DOING THINGS: 0

## 2019-05-14 ASSESSMENT — ENCOUNTER SYMPTOMS
BLOOD IN STOOL: 0
SHORTNESS OF BREATH: 1
CHEST TIGHTNESS: 1
DIARRHEA: 0
CONSTIPATION: 0

## 2019-05-14 ASSESSMENT — COPD QUESTIONNAIRES: COPD: 1

## 2019-05-14 NOTE — PROGRESS NOTES
Patient ID: Lenka Amos [id number]    . Chief Complaint   Patient presents with    COPD     was mis using the Combivent inhaler         COPD   She complains of shortness of breath. This is a chronic problem. The current episode started more than 1 year ago. The problem has been unchanged. Pertinent negatives include no chest pain. Her symptoms are aggravated by strenuous activity, URI and emotional stress. Her symptoms are alleviated by rest (oxygen. ). Risk factors for lung disease include smoking/tobacco exposure. Her past medical history is significant for COPD. Hyperlipidemia   This is a chronic problem. The current episode started more than 1 year ago. Associated symptoms include shortness of breath. Pertinent negatives include no chest pain. Current antihyperlipidemic treatment includes statins. There are no compliance problems. Risk factors for coronary artery disease include a sedentary lifestyle. Alcohol Problem   Primary symptoms comment: Granddaughter continues to buy the alcohol for her. . This is a chronic problem. The problem is unchanged. Suspected agents: beer. Past treatments include nothing. Improvement on treatment: not interested in stopping drinking. PVD:  On Plavix for this. Continues to take it regularly. Has seen vascular surgeon for this. Unfortunately, continues to smoke. Granddaughter continues to buy cigarettes for her. She is been a blood. Review of Systems   Respiratory: Positive for chest tightness and shortness of breath. Cardiovascular: Negative for chest pain, palpitations and leg swelling. Gastrointestinal: Negative for blood in stool, constipation and diarrhea. Musculoskeletal: Positive for arthralgias and gait problem.        Patient Active Problem List   Diagnosis    Peripheral vascular disease ([address]    Hyperlipidemia    Alcohol abuse    Moderate COPD (chronic obstructive pulmonary disease) ([address]    Underweight    Osteoporosis    Tobacco abuse  History of breast cancer    Hyponatremia       Past Medical History:   Diagnosis Date    AAA (abdominal aortic aneurysm) (HCC)     Alcohol abuse     4 beers daily    Arthritis     Asthma     COPD (chronic obstructive pulmonary disease) (HCC)     History of breast cancer Dx     Lumpectomy Right Breast For Cancer, Had Radiation Treatments    Hx of blood clots     HX OTHER MEDICAL     Primary Care Physician Is Dr. Shasta Chavis    Hyperlipidemia     high HDL    Hypertension     Iliac artery aneurysm, bilateral ([address]     On home oxygen therapy     At Night 2 Liters Per Nasal Cannula    Osteopenia     Peripheral vascular disease (HCC) Dx in        Past Surgical History:   Procedure Laterality Date    ABDOMEN SURGERY      \"Surgery For Adhesions\"    ABDOMINAL AORTIC ANEURYSM REPAIR, ENDOVASCULAR  2012    BREAST BIOPSY  2013    benign    BREAST LUMPECTOMY      Lumpectomy Right Breast For Cancer, Had Radiation  Treatments    [facility]    DENTAL SURGERY      Teeth Extracted In Past    FEMORAL BYPASS      \"Both Legs\"    FRACTURE SURGERY  Early     Broken Left Arm    HYSTERECTOMY, TOTAL ABDOMINAL         Family History   Problem Relation Age of Onset    Colon Cancer Father     Coronary Art Dis Sister     Heart Disease Sister     High Blood Pressure Sister     High Cholesterol Sister     Coronary Art Dis Brother     Heart Attack Brother          age 76    Heart Disease Brother     Early Death Brother     Other Sister         \"Both Legs Amputated\"    Kidney Disease Sister         \"Kidney Problems\"    Early Death Brother         Suicide- hung himself    Early Death Brother         Murdered in his 42's    Early Death Son 1         in a fire    Early Death Mother 61    Vision Loss Sister         \"Eye Problems\"    Other Daughter         \"Lung Problems\"       Current Outpatient Medications on File Prior to Visit Medication Sig Dispense Refill    Multiple Vitamins-Minerals (THERAPEUTIC MULTIVITAMIN-MINERALS) tablet Take 1 tablet by mouth daily 90 tablet 3    albuterol-ipratropium (COMBIVENT RESPIMAT)  MCG/ACT AERS inhaler Inhale 1 puff into the lungs every 6 hours as needed for Wheezing 4 Inhaler 3    vitamin B-1 (THIAMINE) 100 MG tablet Take 1 tablet by mouth daily 90 tablet 3    calcium carbonate-vitamin D (CALCIUM 600 + D) 600-400 MG-UNIT TABS per tab Twice daily by mouth 60 tablet 11    aspirin 325 MG tablet Take 325 mg by mouth every morning. Over The Counter , Last Dose Taken 11-9-12 Due To Scheduled Procedure      OXYGEN by Nasal route nightly. Oxygen 2 liters per minute      Spacer/Aero Chamber Mouthpiece MISC 1 each by Does not apply route once as needed (SOB) 1 each 0    UNABLE TO FIND Shingles vaccine 1 Units 0     No current facility-administered medications on file prior to visit. Objective:     Physical Exam   Constitutional: She appears cachectic. HENT:   Head: Normocephalic and atraumatic. Neck: Neck supple. Cardiovascular: Normal rate, regular rhythm, S1 normal, S2 normal and normal heart sounds. Pulmonary/Chest: Effort normal. No respiratory distress. She has decreased breath sounds. She has no wheezes. Musculoskeletal:        Left hand: She exhibits decreased range of motion. Walks slowly and stiffly without assistance   Neurological: She is alert. Skin: Skin is warm, dry and intact. Smells of tobacco   Psychiatric: She has a normal mood and affect. Nursing note and vitals reviewed. Vitals:    05/14/19 1000   BP: 120/80   Site: Right Upper Arm   Position: Sitting   Cuff Size: Child   Pulse: 55   SpO2: 99%   Weight: 93 lb 12.8 oz (42.5 kg)   Height: 5' 4\" (1.626 m)     Body mass index is 16.1 kg/m².      Wt Readings from Last 3 Encounters:   05/14/19 93 lb 12.8 oz (42.5 kg)   04/01/19 94 lb 12.8 oz (43 kg)   11/14/18 96 lb (43.5 kg)     BP Readings from Last 3 Encounters:   05/14/19 120/80   04/01/19 100/80   11/14/18 128/80          No results found for this visit on 05/14/19. The ASCVD Risk score (Quincy Lagos et al., 2013) failed to calculate for the following reasons: The 2013 ASCVD risk score is only valid for ages 36 to 78  Lab Review   No visits with results within 6 Month(s) from this visit. Latest known visit with results is:   Hospital Outpatient Visit on 08/15/2018   Component Date Value    Sodium 08/15/2018 134*    Potassium 08/15/2018 4.9     Chloride 08/15/2018 94*    CO2 08/15/2018 24     BUN 08/15/2018 6     CREATININE 08/15/2018 0.7     Glucose 08/15/2018 62*    Calcium 08/15/2018 10.3     Alb 08/15/2018 4.7     Total Protein 08/15/2018 6.9     Total Bilirubin 08/15/2018 0.5     ALT 08/15/2018 14     AST 08/15/2018 21     Alkaline Phosphatase 08/15/2018 91     GFR Non- 08/15/2018 >60     GFR  08/15/2018 >60     Anion Gap 08/15/2018 16            Assessment:       Diagnosis Orders   1. Moderate COPD (chronic obstructive pulmonary disease) (HCC)  fluticasone propionate (FLOVENT DISKUS) 250 MCG/BLIST AEPB inhaler   2. Tobacco abuse     3. Peripheral vascular disease (HCC)  clopidogrel (PLAVIX) 75 MG tablet    atorvastatin (LIPITOR) 80 MG tablet   4. Alcohol abuse  folic acid (FOLVITE) 1 MG tablet   5. Cachexia ([address]             Plan:      Recommended to quit smoking,. Continue current medication. Pulse ox good today    Discussed the enabling activity by the granddaughter. Both are happy with the current arrangement. Granddaughter does not want to be respectful to her grandmother. Explained that patient would not have any life-threatening withdrawal to tobacco would be stopped today. However if they were to stop the alcohol, he would have to wean her down. Neither one is interested in doing this at this time. Recheck in 6 months    Continue with the vitamins.

## 2019-05-28 ENCOUNTER — HOSPITAL ENCOUNTER (EMERGENCY)
Age: 79
Discharge: HOME | End: 2019-05-28
Payer: COMMERCIAL

## 2019-05-28 VITALS
HEIGHT: 62 IN | HEART RATE: 66 BPM | TEMPERATURE: 98 F | RESPIRATION RATE: 18 BRPM | BODY MASS INDEX: 17.02 KG/M2 | SYSTOLIC BLOOD PRESSURE: 171 MMHG | OXYGEN SATURATION: 97 % | WEIGHT: 92.5 LBS | DIASTOLIC BLOOD PRESSURE: 96 MMHG

## 2019-05-28 DIAGNOSIS — H61.23 BILATERAL HEARING LOSS DUE TO CERUMEN IMPACTION: Primary | ICD-10-CM

## 2019-05-28 PROCEDURE — 6370000000 HC RX 637 (ALT 250 FOR IP): Performed by: PHYSICIAN ASSISTANT

## 2019-05-28 PROCEDURE — 4500000028 HC INTERMEDIATE PROCEDURE

## 2019-05-28 PROCEDURE — 99282 EMERGENCY DEPT VISIT SF MDM: CPT

## 2019-05-28 RX ORDER — OFLOXACIN 3 MG/ML
5 SOLUTION AURICULAR (OTIC) 2 TIMES DAILY
Qty: 5 ML | Refills: 0 | Status: SHIPPED | OUTPATIENT
Start: 2019-05-28 | End: 2019-06-07

## 2019-05-28 RX ADMIN — Medication 5 DROP: at 11:18

## 2019-05-28 ASSESSMENT — PAIN DESCRIPTION - DESCRIPTORS: DESCRIPTORS: ACHING

## 2019-05-28 ASSESSMENT — PAIN DESCRIPTION - PAIN TYPE: TYPE: ACUTE PAIN

## 2019-05-28 ASSESSMENT — PAIN DESCRIPTION - LOCATION
LOCATION: HEAD
LOCATION: HEAD;EAR

## 2019-05-28 ASSESSMENT — PAIN SCALES - GENERAL: PAINLEVEL_OUTOF10: 5

## 2019-05-28 NOTE — ED PROVIDER NOTES
eMERGENCY dEPARTMENT eNCOUnter         [address] [facility]    PCP: Deion Núñez MD    [address] [facility]    Chief Complaint   Patient presents with    Hearing Problem     ringing in her ears last night; heard a \"pop\"; this morning can't hear anything       HPI    Janessa Mayfield is a [de-identified] y.o. female who presents with bilateral ear pain and decreased hearing. HPI is provided by granddaughter who is at bedside who cares for patient. Patient reportedly began having \"ringing\" in both ears last night with decreased hearing. No preceding trauma or injury. Today she felt a \"pop\" in both ears without bloody or purulent drainage. Patient tried to irrigate her ears with water as she feels it may be a wax buildup but had no drainage. Granddaughter states patient has had gradual decline in her hearing over the last few years secondary to her age however these symptoms are new since yesterday. Granddaughter does believe symptoms are secondary to ear wax impaction, she tried calling patient's PCP for a referral to ENT however they are closed. Granddaughter brought patient to the ED for possible ear irrigation. Patient he does not utilize hearing aids/ear phones and does not frequently use Q-tips. No recent air travel sick contacts or swimming activities. No recent URI symptoms including fever chills, cough or cold. Otherwise asymptomatic for headache, dizziness lightheadedness or near syncope. Acting normal baseline mental status per granddaughter. REVIEW OF SYSTEMS    General: fevers, chills  Pulmonary: No difficulty breathing or hemoptysis  General: No fevers or syncope  GI: No vomiting or diarrhea  Neurologic: No dizziness, lightheadedness, numbness/tingling, confusion.     All other review of systems are negative  See HPI and nursing notes for additional information     PAST MEDICAL AND SURGICAL HISTORY    Past Medical History:   Diagnosis Date    AAA (abdominal aortic aneurysm) (HCC)     Alcohol abuse     4 beers daily    Arthritis     Asthma     COPD (chronic obstructive pulmonary disease) (HCC)     History of breast cancer Dx 12-06    Lumpectomy Right Breast For Cancer, Had Radiation Treatments    Hx of blood clots     HX OTHER MEDICAL     Primary Care Physician Is Dr. Wolfgang Owen    Hyperlipidemia     high HDL    Hypertension     Iliac artery aneurysm, bilateral ([address]     On home oxygen therapy     At Night 2 Liters Per Nasal Cannula    Osteopenia     Peripheral vascular disease (HCC) Dx in 5-2008     Past Surgical History:   Procedure Laterality Date    ABDOMEN SURGERY  1970's    \"Surgery For Adhesions\"    ABDOMINAL AORTIC ANEURYSM REPAIR, ENDOVASCULAR  11/13/2012    BREAST BIOPSY  5/2013    benign    BREAST LUMPECTOMY  12-06    Lumpectomy Right Breast For Cancer, Had Radiation  Treatments    COLON[place]COPY  2011, 8-12    [place]    DENTAL SURGERY      Teeth Extracted In Past    FEMORAL BYPASS  4-09    \"Both Legs\"    FRACTURE SURGERY  Early 1980's    Broken Left Arm    HYSTERECTOMY, TOTAL ABDOMINAL  1980's     CURRENT MEDICATIONS    Current Outpatient Rx   Medication Sig Dispense Refill    carbamide peroxide (DEBROX) 6.5 % otic solution Place 5 drops into both ears 2 times daily 1 Bottle 0    ofloxacin (FLOXIN) 0.3 % otic solution Place 5 drops into both ears 2 times daily for 10 days 5 mL 0    clopidogrel (PLAVIX) 75 MG tablet Take 1 tablet by mouth daily 90 tablet 1    atorvastatin (LIPITOR) 80 MG tablet Take 1 tablet by mouth daily 90 tablet 1    folic acid (FOLVITE) 1 MG tablet Take 1 tablet by mouth daily 90 tablet 3    fluticasone propionate (FLOVENT DISKUS) 250 MCG/BLIST AEPB inhaler 1 puff twice per day 1 each 5    Spacer/Aero Chamber Mouthpiece MISC 1 each by Does not apply route once as needed (SOB) 1 each 0    Multiple Vitamins-Minerals (THERAPEUTIC MULTIVITAMIN-MINERALS) tablet Take 1 tablet by mouth daily 90 tablet 3    albuterol-ipratropium (COMBIVENT RESPIMAT)  MCG/ACT AERS inhaler Inhale 1 puff into the lungs every 6 hours as needed for Wheezing 4 Inhaler 3    vitamin B-1 (THIAMINE) 100 MG tablet Take 1 tablet by mouth daily 90 tablet 3    UNABLE TO FIND Shingles vaccine 1 Units 0    calcium carbonate-vitamin D (CALCIUM 600 + D) 600-400 MG-UNIT TABS per tab Twice daily by mouth 60 tablet 11    aspirin 325 MG tablet Take 325 mg by mouth every morning. Over The Counter , Last Dose Taken 12 Due To Scheduled Procedure      OXYGEN by Nasal route nightly. Oxygen 2 liters per minute       ALLERGIES    Allergies   Allergen Reactions    Pcn [Penicillins] Other (See Comments)     \"Been so long ago, that I forgot. \"    Thiazide-Type Diuretics      low sodium     FAMILY AND SOCIAL HISTORY    Family History   Problem Relation Age of Onset    Colon Cancer Father     Coronary Art Dis Sister     Heart Disease Sister     High Blood Pressure Sister     High Cholesterol Sister     Coronary Art Dis Brother     Heart Attack Brother          age 76    Heart Disease Brother     Early Death Brother     Other Sister         \"Both Legs Amputated\"   Spangler Kidney Disease Sister         \"Kidney Problems\"    Early Death Brother         Suicide- hung himself    Early Death Brother         Murdered in his 42's    Early Death Son 1         in a fire    Early Death Mother 61    Vision Loss Sister         \"Eye Problems\"   Spangler Other Daughter         \"Lung Problems\"     Social History     Socioeconomic History    Marital status:      Spouse name: None    Number of children: None    Years of education: None    Highest education level: None   Occupational History    None   Social Needs    Financial resource strain: None    Food insecurity:     Worry: None     Inability: None    Transportation needs:     Medical: None     Non-medical: None   Tobacco Use    Smoking status: Current Every Day Smoker     Packs/day: 1.00     Years: 61.00     Pack years: 61.00     Types: Cigarettes    Smokeless tobacco: Never Used   Substance and Sexual Activity    Alcohol use: Yes     Alcohol/week: 0.0 oz     Comment: \"Beer Every Day\"    Drug use: No    Sexual activity: Never   Lifestyle    Physical activity:     Days per week: None     Minutes per session: None    Stress: None   Relationships    Social connections:     Talks on phone: None     Gets together: None     Attends [employer] service: None     Active member of club or organization: None     Attends meetings of clubs or organizations: None     Relationship status: None    Intimate partner violence:     Fear of current or ex partner: None     Emotionally abused: None     Physically abused: None     Forced sexual activity: None   Other Topics Concern    None   Social History Narrative    None       PHYSICAL EXAM    VITAL SIGNS: BP (!) 171/96   Pulse 66   Temp 98 °F (36.7 °C) (Oral)   Resp 18   Ht 5' 2\" (1.575 m)   Wt 92 lb 8 oz (42 kg)   LMP  (LMP Unknown)   SpO2 97%   BMI 16.92 kg/m²   Constitutional:  Well developed, well nourished, no acute distress. Smiling and pleasant but having difficulty hearing from both ears  HEENT:     - Normocephalic, Atraumatic   - PERRL, EOM intact. Conjunctiva normal    -  Frontal/Maxillary sinuses NONtender to percussion.   - Nasal passages patent, nonedematous      No massess.   - Oropharynx patent, tolerating oral secretions. No trismus   - No swollen lymph nodes. Ears:  - External ears normal appearing without redness or edema.   - No mastoid tenderness bilaterally. - No tenderness to palpation of the pinna or tragus bilaterally. - Bilateral EACs are impacted with yellow dried cerumen, unable to visualize the tympanic membranes bilaterally. Very narrow EACs.  Visible outer EACs are nonerythematous and nonedematous and no pain with speculum placement      Respiratory:  Lungs clear to auscultation, no wheezes/rhonchi/rales, no retractions  Cardiovascular:  Normal rate, normal rhythm, no murmurs  Musculoskeletal:  No edema   Neurologic: Awake alert and oriented, and no slurred speech  Integument:  Skin is warm and dry, no rash      ________________________________________________________________________    Procedure Note:  Ear Canal Cerumen Removal     Indication:  Bilateral external Ear Canal Cerumen impaction    Procedure:   - Procedure explained, including risks and benefits explained to the patient/granddaughter who expressed understanding. All questions were answered. Verbal consent obtained. - After placing the patient's head in the appropriate position, debrox drops were placed to both EACs. The patient's bilateral ear canals were individually irrigated with the appropriate solution of saline and hydrogen peroxide and curetted until the procedure was discontinued at the patient's request.  Small amount of yellow scaly thick cerumen was removed bilaterally however patient did begin having irritation/mild bleeding from the left EAC. Still unable to visualize the TMs bilaterally. At this point, unsuccessful complete removal of cerumen and given patient's pain, procedure was stopped. The patient was referred to an ENT specialist for further management. The patient tolerated the procedure well. Complications: None    ________________________________________________________________________        I have reviewed and interpreted all of the currently available lab results from this visit (if applicable):  No results found for this visit on 05/28/19. ED COURSE & MEDICAL DECISION MAKING       Vital signs and nursing notes reviewed during ED course. I have independently evaluated this patient . Supervising MD - Dr. Teri Dai - present in the Emergency Department, available for consultation, throughout entirety of  patient care. All pertinent Lab data and radiographic results reviewed with patient at bedside.        The patient and/or the family were informed of the results of any tests/labs/imaging, the treatment plan, and time was allotted to answer questions. Differential diagnoses: Mastoiditis, Auricular cellulitis, Malignant otitis externa, Otitis media, Subarachnoid hemorrhage, Odontogenic infection, TMJ syndrome, other. Clinical  IMPRESSION    1. Bilateral hearing loss due to cerumen impaction        Patient presents with decreased hearing and \"popping\" sensation in bilateral ears. On exam, well-appearing nontoxic 78-year-old female, ambulating the ED with a steady gait, no gross neurologic changes or altered mental status per granddaughter who is caretaker at bedside. She is afebrile no acute distress. Bilateral ear exam reveals normal external ears without mastoid tenderness or pain on tragus/pinna manipulation. Patient has very narrowed ear canals bilaterally which appear completely impacted with yellow dried cerumen. Unable to visualize the tympanic membrane. Debrox was applied to both ears and myself as well as ED nurse attempted bilateral EAC irrigation for cerumen oval with curette as well as irrigation with appropriate solution as in above procedure note. We were able to remove a small amount of cerumen bilaterally however on serial speculum exams, patient continues to be obstructed and unable to visualize the TM. At this time, procedure was stopped this patient was reporting increasing pain and unable to tolerate the procedure. She was developing some irritation and mild superficial bleeding through the left EAC but no evidence of TM rupture/injury. At this point, we discussed symptomatic care, topical antibiotic coverage and close follow-up with outpatient ENT specialist for continued management. Granddaughter is comfortable and agreeable with this plan.   I did discuss cannot completely rule out an TM abnormality as I'm not able to visualize them on exam.  The visible outer EAC does not appear acutely infected or edematous at this time. We discussed risks versus benefits of head/facial imaging however granddaughter does believe symptoms are secondary to bilateral ear wax impaction and does not want any further workup or evaluation. Low clinical suspicion at this time for mastoiditis, auricular cellulitis, malignant otitis externa, meningitis/encephalitis or other acute intracranial abnormality. Patient is discharged at this time stable condition. Educated to avoid placement of foreign bodies including Q-tips or irrigation. Should keep ears clean and dry. Will be given topical Debrox as well as empiric coverage with ofloxacin otic drops. Patient/granddaughter are given a list of local ENT specialist for close follow-up in the next 1-2 days. Return warnings back to the discuss for any new or worsening symptoms. Diagnosis and plan discussed in detail with patient who understands and agrees. Patient agrees to return emergency department if symptoms worsen or any new symptoms develop. Comment: Please note this report has been produced using speech recognition software and may contain errors related to that system including errors in grammar, punctuation, and spelling, as well as words and phrases that may be inappropriate. If there are any questions or concerns please feel free to contact the dictating provider for clarification.             Nida Whaley PA-C  05/28/19 [id number]

## 2019-05-28 NOTE — ED NOTES
Celeste Bhat is an alert [de-identified] y.o female that presents to ED with her grand-daughter today due to hearing problems. Pt reportedly, had ringing in her ears last night then heard a \"pop\" and now has difficulty hearing, worse from her baseline of impaired hearing. Pt's ears appear to have excessive amount of wax noted. Pt does report a headache secondary to the ear pain and fullness.       Dong Murphy RN  05/28/19 [id number]

## 2019-05-28 NOTE — ED NOTES
Both ears irrigated at this time with saline. Minimal amount of ear cerumen came out with irrigation. Both ears appear to have large amount of ear wax. Pt reports irrigation is painful. More debrox solution added to both ears. Giovanni Dover PA-C notified.       Rg Garcia RN  05/28/19 [id number]

## 2019-05-28 NOTE — ED NOTES
Ears irrigated again at this time. Large amount of cerumen removed out of left ear at this time. Some difficulty with the right ear. Pt reported the procedure became painful so this nurse stopped the procedure at this time. Perry Ryan PA-C notified.       Rg Garcia RN  05/28/19 2230

## 2019-05-29 ENCOUNTER — TELEPHONE (OUTPATIENT)
Dept: FAMILY MEDICINE CLINIC | Age: 79
End: 2019-05-29

## 2019-05-29 NOTE — TELEPHONE ENCOUNTER
ENT will do the same irrigation that we or the ER do. Can fill ear canal with warm (not hot) baby oil. Let it stay in the ears for up to 15 minutes and then see if if can rinse out in the shower. Will most likely need to repeat this several times. No q tips in the ears!

## 2019-05-29 NOTE — TELEPHONE ENCOUNTER
[address] daughter called stating had Audrey at ER yesterday for ringing in ears. They tried to irrigate ears and she was not able to tolerate it and ears were bleeding. Asking for ENT referral.  Offered appointment today but declined since was at ER yesterday.

## 2019-06-03 ENCOUNTER — OFFICE VISIT (OUTPATIENT)
Dept: FAMILY MEDICINE CLINIC | Age: 79
End: 2019-06-03
Payer: COMMERCIAL

## 2019-06-03 VITALS
BODY MASS INDEX: 15.47 KG/M2 | DIASTOLIC BLOOD PRESSURE: 74 MMHG | HEART RATE: 64 BPM | HEIGHT: 64 IN | WEIGHT: 90.6 LBS | SYSTOLIC BLOOD PRESSURE: 128 MMHG

## 2019-06-03 DIAGNOSIS — R64 CACHECTIC (HCC): ICD-10-CM

## 2019-06-03 DIAGNOSIS — H61.23 IMPACTED CERUMEN OF BOTH EARS: ICD-10-CM

## 2019-06-03 DIAGNOSIS — H91.93 DECREASED HEARING OF BOTH EARS: Primary | ICD-10-CM

## 2019-06-03 DIAGNOSIS — F10.10 ALCOHOL ABUSE: ICD-10-CM

## 2019-06-03 DIAGNOSIS — J44.9 MODERATE COPD (CHRONIC OBSTRUCTIVE PULMONARY DISEASE) (HCC): ICD-10-CM

## 2019-06-03 PROBLEM — E87.1 HYPONATREMIA: Status: RESOLVED | Noted: 2018-06-01 | Resolved: 2019-06-03

## 2019-06-03 PROCEDURE — G8428 CUR MEDS NOT DOCUMENT: HCPCS | Performed by: FAMILY MEDICINE

## 2019-06-03 PROCEDURE — 1123F ACP DISCUSS/DSCN MKR DOCD: CPT | Performed by: FAMILY MEDICINE

## 2019-06-03 PROCEDURE — G8419 CALC BMI OUT NRM PARAM NOF/U: HCPCS | Performed by: FAMILY MEDICINE

## 2019-06-03 PROCEDURE — 4004F PT TOBACCO SCREEN RCVD TLK: CPT | Performed by: FAMILY MEDICINE

## 2019-06-03 PROCEDURE — 99213 OFFICE O/P EST LOW 20 MIN: CPT | Performed by: FAMILY MEDICINE

## 2019-06-03 PROCEDURE — G8926 SPIRO NO PERF OR DOC: HCPCS | Performed by: FAMILY MEDICINE

## 2019-06-03 PROCEDURE — 4040F PNEUMOC VAC/ADMIN/RCVD: CPT | Performed by: FAMILY MEDICINE

## 2019-06-03 PROCEDURE — 69210 REMOVE IMPACTED EAR WAX UNI: CPT | Performed by: FAMILY MEDICINE

## 2019-06-03 PROCEDURE — 3023F SPIROM DOC REV: CPT | Performed by: FAMILY MEDICINE

## 2019-06-03 PROCEDURE — 1090F PRES/ABSN URINE INCON ASSESS: CPT | Performed by: FAMILY MEDICINE

## 2019-06-03 PROCEDURE — G8399 PT W/DXA RESULTS DOCUMENT: HCPCS | Performed by: FAMILY MEDICINE

## 2019-06-03 NOTE — PATIENT INSTRUCTIONS
Patient Education        Earwax Blockage: Care Instructions  Your Care Instructions    Earwax is a natural substance that protects the ear canal. Normally, earwax drains from the ears and does not cause problems. Sometimes earwax builds up and hardens. Earwax blockage (also called cerumen impaction) can cause some loss of hearing and pain. When wax is tightly packed, you will need to have your doctor remove it. Follow-up care is a key part of your treatment and safety. Be sure to make and go to all appointments, and call your doctor if you are having problems. It's also a good idea to know your test results and keep a list of the medicines you take. How can you care for yourself at home? · Do not try to remove earwax with cotton swabs, fingers, or other objects. This can make the blockage worse and damage the eardrum. · If your doctor recommends that you try to remove earwax at home:  ? Soften and loosen the earwax with warm mineral oil. You also can try hydrogen peroxide mixed with an equal amount of room temperature water. Place 2 drops of the fluid, warmed to body temperature, in the ear two times a day for up to 5 days. ? Once the wax is loose and soft, all that is usually needed to remove it from the ear canal is a gentle, warm shower. Direct the water into the ear, then tip your head to let the earwax drain out. Dry your ear thoroughly with a hair dryer set on low. Hold the dryer several inches from your ear. ? If the warm mineral oil and shower do not work, use an over-the-counter wax softener. Read and follow all instructions on the label. After using the wax softener, use an ear syringe to gently flush the ear. Make sure the flushing solution is body temperature. Cool or hot fluids in the ear can cause dizziness. When should you call for help?   Call your doctor now or seek immediate medical care if:    · Pus or blood drains from your ear.     · Your ears are ringing or feel full.     · You have a loss of hearing.    Watch closely for changes in your health, and be sure to contact your doctor if:    · You have pain or reduced hearing after 1 week of home treatment.     · You have any new symptoms, such as nausea or balance problems. Where can you learn more? Go to https://[email or web address].[employer]. org and sign in to your [employer] account. Enter E952 in the [place] box to learn more about \"Earwax Blockage: Care Instructions. \"     If you do not have an account, please click on the \"Sign Up Now\" link. Current as of: September 23, 2018  Content Version: 12.0  © 1670-5953 [employer], [employer]orporated. Care instructions adapted under license by [address] If you have questions about a medical condition or this instruction, always ask your healthcare professional. [address] any warranty or liability for your use of this information.

## 2019-06-03 NOTE — PROGRESS NOTES
Patient ID: Raul Thompson [id number]    Chief Complaint   Patient presents with    Ear Fullness     hearing loss out of both ears         HPI   Hearing loss: hx per caregiver. Went to ER and patient could not tolerate ear irrigation. Was treated with ABX. Ears don't hurt now, but just can't hear.   ER had recommended ENT referral.  See ER notes    Review of Systems   Unable to perform ROS: Other (hard of hearing)       Patient Active Problem List   Diagnosis    Peripheral vascular disease ([address]    Hyperlipidemia    Alcohol abuse    Moderate COPD (chronic obstructive pulmonary disease) ([address]    Underweight    Osteoporosis    Tobacco abuse    History of breast cancer    Hyponatremia       Past Medical History:   Diagnosis Date    AAA (abdominal aortic aneurysm) ([address]     Alcohol abuse     4 beers daily    Arthritis     Asthma     COPD (chronic obstructive pulmonary disease) ([address]     History of breast cancer Dx 12-06    Lumpectomy Right Breast For Cancer, Had Radiation Treatments    Hx of blood clots     HX OTHER MEDICAL     Primary Care Physician Is Dr. Valentina Lira    Hyperlipidemia     high HDL    Hypertension     Iliac artery aneurysm, bilateral ([address]     On home oxygen therapy     At Night 2 Liters Per Nasal Cannula    Osteopenia     Peripheral vascular disease ([address] Dx in 5-2008       Past Surgical History:   Procedure Laterality Date    ABDOMEN SURGERY  1970's    \"Surgery For Adhesions\"    ABDOMINAL AORTIC ANEURYSM REPAIR, ENDOVASCULAR  11/13/2012    BREAST BIOPSY  5/2013    benign    BREAST LUMPECTOMY  12-06    Lumpectomy Right Breast For Cancer, Had Radiation  Treatments    [facility]    DENTAL SURGERY      Teeth Extracted In Past    FEMORAL BYPASS  4-09    \"Both Legs\"    FRACTURE SURGERY  Early 1980's    Broken Left Arm    HYSTERECTOMY, TOTAL ABDOMINAL  1980's       Family History   Problem Relation Age of Onset    Colon Cancer Father     Coronary Art Dis Sister     Heart Disease Sister     High Blood Pressure Sister     High Cholesterol Sister     Coronary Art Dis Brother     Heart Attack Brother          age 76    Heart Disease Brother     Early Death Brother     Other Sister         \"Both Legs Amputated\"    Kidney Disease Sister         \"Kidney Problems\"    Early Death Brother         Suicide- hung himself    Early Death Brother         Murdered in his 42's    Early Death Son 1         in a fire    Early Death Mother 61    Vision Loss Sister         \"Eye Problems\"    Other Daughter         \"Lung Problems\"       Current Outpatient Medications on File Prior to Visit   Medication Sig Dispense Refill    carbamide peroxide (DEBROX) 6.5 % otic solution Place 5 drops into both ears 2 times daily 1 Bottle 0    ofloxacin (FLOXIN) 0.3 % otic solution Place 5 drops into both ears 2 times daily for 10 days 5 mL 0    clopidogrel (PLAVIX) 75 MG tablet Take 1 tablet by mouth daily 90 tablet 1    atorvastatin (LIPITOR) 80 MG tablet Take 1 tablet by mouth daily 90 tablet 1    folic acid (FOLVITE) 1 MG tablet Take 1 tablet by mouth daily 90 tablet 3    fluticasone propionate (FLOVENT DISKUS) 250 MCG/BLIST AEPB inhaler 1 puff twice per day 1 each 5    Spacer/Aero Chamber Mouthpiece MISC 1 each by Does not apply route once as needed (SOB) 1 each 0    Multiple Vitamins-Minerals (THERAPEUTIC MULTIVITAMIN-MINERALS) tablet Take 1 tablet by mouth daily 90 tablet 3    albuterol-ipratropium (COMBIVENT RESPIMAT)  MCG/ACT AERS inhaler Inhale 1 puff into the lungs every 6 hours as needed for Wheezing 4 Inhaler 3    vitamin B-1 (THIAMINE) 100 MG tablet Take 1 tablet by mouth daily 90 tablet 3    UNABLE TO FIND Shingles vaccine 1 Units 0    calcium carbonate-vitamin D (CALCIUM 600 + D) 600-400 MG-UNIT TABS per tab Twice daily by mouth 60 tablet 11    aspirin 325 MG tablet Take 325 mg by mouth every morning.  Over The Counter , Last Dose Taken patient continued to have difficulty hearing. Mineral oil soaks, hydrogen peroxide and water soaks, or salt water soaks to ear. Avoid Q tips.   Call if this still does not help and can send to ENT

## 2019-06-10 ENCOUNTER — TELEPHONE (OUTPATIENT)
Dept: FAMILY MEDICINE CLINIC | Age: 79
End: 2019-06-10

## 2019-06-10 DIAGNOSIS — H91.93 DECREASED HEARING OF BOTH EARS: ICD-10-CM

## 2019-06-10 DIAGNOSIS — H61.23 IMPACTED CERUMEN OF BOTH EARS: Primary | ICD-10-CM

## 2019-06-11 ENCOUNTER — TELEPHONE (OUTPATIENT)
Dept: FAMILY MEDICINE CLINIC | Age: 79
End: 2019-06-11

## 2019-06-11 NOTE — TELEPHONE ENCOUNTER
[facility] called and does not accept's patient's insurance. Spoke with Herb Calabrese grand daughter would like to try Brennon Guo or Nia. referral sent to [address] [place] [address] [facility] Notified grand daughter aware the ENT will be calling and will have her brother take patient to appointment. Ent to call andea to make appointment.

## 2019-08-16 PROBLEM — H91.93 BILATERAL HEARING LOSS: Status: ACTIVE | Noted: 2019-08-16

## 2019-08-23 DIAGNOSIS — J44.9 MODERATE COPD (CHRONIC OBSTRUCTIVE PULMONARY DISEASE) (HCC): ICD-10-CM

## 2019-08-23 RX ORDER — IPRATROPIUM/ALBUTEROL SULFATE 20-100 MCG
MIST INHALER (GRAM) INHALATION
Qty: 4 G | Refills: 0 | OUTPATIENT
Start: 2019-08-23

## 2019-08-23 NOTE — TELEPHONE ENCOUNTER
Patient requesting refill on Combivent Respimat inhaler                             Addendum: see med list--should have RF's.

## 2019-08-28 ENCOUNTER — TELEPHONE (OUTPATIENT)
Dept: FAMILY MEDICINE CLINIC | Age: 79
End: 2019-08-28

## 2019-10-08 ENCOUNTER — NURSE ONLY (OUTPATIENT)
Dept: FAMILY MEDICINE CLINIC | Age: 79
End: 2019-10-08
Payer: COMMERCIAL

## 2019-10-08 DIAGNOSIS — Z23 NEEDS FLU SHOT: Primary | ICD-10-CM

## 2019-10-08 PROCEDURE — G0008 ADMIN INFLUENZA VIRUS VAC: HCPCS | Performed by: FAMILY MEDICINE

## 2019-10-08 PROCEDURE — 90653 IIV ADJUVANT VACCINE IM: CPT | Performed by: FAMILY MEDICINE

## 2019-11-05 ENCOUNTER — OFFICE VISIT (OUTPATIENT)
Dept: FAMILY MEDICINE CLINIC | Age: 79
End: 2019-11-05
Payer: COMMERCIAL

## 2019-11-05 VITALS
HEIGHT: 64 IN | WEIGHT: 93.6 LBS | SYSTOLIC BLOOD PRESSURE: 130 MMHG | HEART RATE: 82 BPM | BODY MASS INDEX: 15.98 KG/M2 | DIASTOLIC BLOOD PRESSURE: 80 MMHG

## 2019-11-05 DIAGNOSIS — I73.9 PERIPHERAL VASCULAR DISEASE (HCC): ICD-10-CM

## 2019-11-05 DIAGNOSIS — J44.9 MODERATE COPD (CHRONIC OBSTRUCTIVE PULMONARY DISEASE) (HCC): Primary | ICD-10-CM

## 2019-11-05 DIAGNOSIS — F10.10 ALCOHOL ABUSE: ICD-10-CM

## 2019-11-05 DIAGNOSIS — R64 CACHECTIC (HCC): ICD-10-CM

## 2019-11-05 DIAGNOSIS — F43.21 GRIEF: ICD-10-CM

## 2019-11-05 DIAGNOSIS — H91.93 BILATERAL HEARING LOSS, UNSPECIFIED HEARING LOSS TYPE: ICD-10-CM

## 2019-11-05 PROCEDURE — 4040F PNEUMOC VAC/ADMIN/RCVD: CPT | Performed by: FAMILY MEDICINE

## 2019-11-05 PROCEDURE — G8482 FLU IMMUNIZE ORDER/ADMIN: HCPCS | Performed by: FAMILY MEDICINE

## 2019-11-05 PROCEDURE — G8419 CALC BMI OUT NRM PARAM NOF/U: HCPCS | Performed by: FAMILY MEDICINE

## 2019-11-05 PROCEDURE — G8427 DOCREV CUR MEDS BY ELIG CLIN: HCPCS | Performed by: FAMILY MEDICINE

## 2019-11-05 PROCEDURE — 3023F SPIROM DOC REV: CPT | Performed by: FAMILY MEDICINE

## 2019-11-05 PROCEDURE — 1090F PRES/ABSN URINE INCON ASSESS: CPT | Performed by: FAMILY MEDICINE

## 2019-11-05 PROCEDURE — 93000 ELECTROCARDIOGRAM COMPLETE: CPT | Performed by: FAMILY MEDICINE

## 2019-11-05 PROCEDURE — 99214 OFFICE O/P EST MOD 30 MIN: CPT | Performed by: FAMILY MEDICINE

## 2019-11-05 PROCEDURE — 4004F PT TOBACCO SCREEN RCVD TLK: CPT | Performed by: FAMILY MEDICINE

## 2019-11-05 PROCEDURE — G8399 PT W/DXA RESULTS DOCUMENT: HCPCS | Performed by: FAMILY MEDICINE

## 2019-11-05 PROCEDURE — 1123F ACP DISCUSS/DSCN MKR DOCD: CPT | Performed by: FAMILY MEDICINE

## 2019-11-05 PROCEDURE — G8926 SPIRO NO PERF OR DOC: HCPCS | Performed by: FAMILY MEDICINE

## 2019-11-05 RX ORDER — ATORVASTATIN CALCIUM 80 MG/1
80 TABLET, FILM COATED ORAL DAILY
Qty: 90 TABLET | Refills: 1 | Status: SHIPPED | OUTPATIENT
Start: 2019-11-05 | End: 2020-07-09 | Stop reason: SDUPTHER

## 2019-11-05 RX ORDER — THIAMINE MONONITRATE (VIT B1) 100 MG
100 TABLET ORAL DAILY
Qty: 90 TABLET | Refills: 3 | Status: SHIPPED | OUTPATIENT
Start: 2019-11-05 | End: 2021-01-01

## 2019-11-05 RX ORDER — CLOPIDOGREL BISULFATE 75 MG/1
75 TABLET ORAL DAILY
Qty: 90 TABLET | Refills: 1 | Status: SHIPPED | OUTPATIENT
Start: 2019-11-05 | End: 2020-07-09 | Stop reason: SDUPTHER

## 2019-11-05 ASSESSMENT — ENCOUNTER SYMPTOMS: SHORTNESS OF BREATH: 1

## 2019-11-05 ASSESSMENT — COPD QUESTIONNAIRES: COPD: 1

## 2019-11-06 ENCOUNTER — TELEPHONE (OUTPATIENT)
Dept: FAMILY MEDICINE CLINIC | Age: 79
End: 2019-11-06

## 2019-11-07 DIAGNOSIS — J20.9 ACUTE BRONCHITIS, UNSPECIFIED ORGANISM: Primary | ICD-10-CM

## 2019-11-07 RX ORDER — GUAIFENESIN 600 MG/1
600 TABLET, EXTENDED RELEASE ORAL 2 TIMES DAILY
Qty: 30 TABLET | Refills: 0 | Status: SHIPPED | OUTPATIENT
Start: 2019-11-07 | End: 2019-11-22

## 2019-11-07 RX ORDER — AZITHROMYCIN 250 MG/1
250 TABLET, FILM COATED ORAL SEE ADMIN INSTRUCTIONS
Qty: 6 TABLET | Refills: 0 | Status: SHIPPED | OUTPATIENT
Start: 2019-11-07 | End: 2019-11-12

## 2020-07-06 RX ORDER — FOLIC ACID 1 MG/1
1 TABLET ORAL DAILY
Qty: 90 TABLET | Refills: 3 | OUTPATIENT
Start: 2020-07-06

## 2020-07-09 ENCOUNTER — TELEPHONE (OUTPATIENT)
Dept: FAMILY MEDICINE CLINIC | Age: 80
End: 2020-07-09

## 2020-07-09 ENCOUNTER — VIRTUAL VISIT (OUTPATIENT)
Dept: FAMILY MEDICINE CLINIC | Age: 80
End: 2020-07-09
Payer: COMMERCIAL

## 2020-07-09 PROCEDURE — 4040F PNEUMOC VAC/ADMIN/RCVD: CPT | Performed by: FAMILY MEDICINE

## 2020-07-09 PROCEDURE — G8399 PT W/DXA RESULTS DOCUMENT: HCPCS | Performed by: FAMILY MEDICINE

## 2020-07-09 PROCEDURE — G8428 CUR MEDS NOT DOCUMENT: HCPCS | Performed by: FAMILY MEDICINE

## 2020-07-09 PROCEDURE — 99214 OFFICE O/P EST MOD 30 MIN: CPT | Performed by: FAMILY MEDICINE

## 2020-07-09 PROCEDURE — 1123F ACP DISCUSS/DSCN MKR DOCD: CPT | Performed by: FAMILY MEDICINE

## 2020-07-09 PROCEDURE — 1090F PRES/ABSN URINE INCON ASSESS: CPT | Performed by: FAMILY MEDICINE

## 2020-07-09 RX ORDER — FOLIC ACID 1 MG/1
1 TABLET ORAL DAILY
Qty: 90 TABLET | Refills: 3 | Status: SHIPPED | OUTPATIENT
Start: 2020-07-09 | End: 2020-10-21 | Stop reason: SDUPTHER

## 2020-07-09 RX ORDER — ATORVASTATIN CALCIUM 80 MG/1
80 TABLET, FILM COATED ORAL DAILY
Qty: 90 TABLET | Refills: 1 | Status: SHIPPED | OUTPATIENT
Start: 2020-07-09 | End: 2020-10-21 | Stop reason: SDUPTHER

## 2020-07-09 RX ORDER — FLUTICASONE PROPIONATE 250 UG/1
POWDER, METERED RESPIRATORY (INHALATION)
Qty: 1 EACH | Refills: 5 | Status: SHIPPED | OUTPATIENT
Start: 2020-07-09 | End: 2020-10-08

## 2020-07-09 RX ORDER — NEBULIZER
EACH MISCELLANEOUS
Qty: 1 EACH | Refills: 0 | Status: SHIPPED | OUTPATIENT
Start: 2020-07-09

## 2020-07-09 RX ORDER — NALTREXONE HYDROCHLORIDE 50 MG/1
50 TABLET, FILM COATED ORAL DAILY
Qty: 30 TABLET | Refills: 2 | Status: ON HOLD | OUTPATIENT
Start: 2020-07-09 | End: 2020-10-05 | Stop reason: HOSPADM

## 2020-07-09 RX ORDER — ATORVASTATIN CALCIUM 80 MG/1
80 TABLET, FILM COATED ORAL DAILY
Qty: 90 TABLET | Refills: 1 | OUTPATIENT
Start: 2020-07-09

## 2020-07-09 RX ORDER — ALBUTEROL SULFATE 2.5 MG/3ML
2.5 SOLUTION RESPIRATORY (INHALATION) EVERY 6 HOURS PRN
Qty: 360 ML | Refills: 11 | Status: SHIPPED | OUTPATIENT
Start: 2020-07-09 | End: 2020-10-21 | Stop reason: SDUPTHER

## 2020-07-09 RX ORDER — CLOPIDOGREL BISULFATE 75 MG/1
75 TABLET ORAL DAILY
Qty: 90 TABLET | Refills: 1 | Status: SHIPPED | OUTPATIENT
Start: 2020-07-09 | End: 2020-10-21 | Stop reason: SDUPTHER

## 2020-07-09 NOTE — TELEPHONE ENCOUNTER
Patient granddaughter called stating she called the pharmacy for a refill on the patients medications: Atorvastatin( Lipitor) 80 mg and Folic Acid( Folvite) 1 mg. Pharmacy told the granddaughter there was no more refills on file.  Please advise

## 2020-07-09 NOTE — LETTER
[facility]  [address]  Phone: [phone]  Fax: [phone]    Cain Vega MD        July 9, 2020    [place]      Dear Aquiles Ford please find a prescription for nebulizer. You can check with [facility] and what occurs and [facility] to see if they can fill this prescription. If you have any questions or concerns, please don't hesitate to call.     Sincerely,          Cain Vega MD

## 2020-07-09 NOTE — PATIENT INSTRUCTIONS
October Fifth is the DEADLINE for voter registration for the November Third GENERAL ELECTION. Don't forget to vote!!!        [address] TO ALL APPOINTMENTS    The diagnoses and medications listed in this after visit summary may not be accurate at the time of check out. Please check MY CHART in 28-48 hours for possible corrections. Late cancellation policy: So that we can better accommodate people who are sick, please give our office 24 hour notice for an appointment cancellation. Thank you. Missed appointments: Your care is very important to us. It is important that you keep your scheduled appointments. Multiple missed appointments will lead to a dismissal from the office. Later arrival policy: If you are more than 10 minutes late for your appointment, you will be asked to reschedule. Please allow 5-7 business days for paperwork to be processed. It is important that you check your MY Chart messages, as they include appointment reminders, test results, and other important information. If you have forgotten your password, please call [phone].

## 2020-07-09 NOTE — PROGRESS NOTES
2020    TELEHEALTH EVALUATION -- Audio/Visual (During SBLML-63 public health emergency)    HPI:    Farshad Multani (:  1940) has requested an audio/video evaluation for the following concern(s):        COPD   She complains of shortness of breath. This is a chronic problem. The current episode started more than 1 year ago. The problem has been unchanged. Associated symptoms include appetite change. Pertinent negatives include no chest pain. Her symptoms are aggravated by strenuous activity, URI and emotional stress. Her symptoms are alleviated by rest (oxygen. ). Risk factors for lung disease include smoking/tobacco exposure. Her past medical history is significant for COPD. Patient does not have access to albuterol nebulized solution. Granddaughter wonders if perhaps she would benefit from this. When patient gets up in the morning she takes her inhaler and then smokes a cigarette right away. Hyperlipidemia   This is a chronic problem. The current episode started more than 1 year ago. Associated symptoms include shortness of breath. Pertinent negatives include no chest pain. Current antihyperlipidemic treatment includes statins. There are no compliance problems. Risk factors for coronary artery disease include a sedentary lifestyle. Alcohol abuse: History as per her granddaughter. Patient is consuming about a sixpack of beer per day. Granddaughter was able to get her to cut back on her liquor and so no longer drinks that. However when her grandmother becomes very irritated granddaughter does give her a shot of liquor to help calm her down. Since cutting back on her liquor, patient's appetite is increased and she has actually gained some weight    Patient is getting more more confused and forgetful. Per granddaughter. Patient told granddaughter before this appointment that she no longer wants to come in to be seen. She is okay with staying home and dying.     Review of Systems Constitutional: Positive for appetite change. Negative for chills, diaphoresis (no unusual) and fever (no unusual). Psychiatric/Behavioral: Positive for agitation and behavioral problems. Prior to Visit Medications    Medication Sig Taking? Authorizing Provider   folic acid (FOLVITE) 1 MG tablet Take 1 tablet by mouth daily Yes Tima Martínez MD   fluticasone propionate (FLOVENT DISKUS) 250 MCG/BLIST AEPB inhaler 1 puff twice per day Yes Tima Martínez MD   clopidogrel (PLAVIX) 75 MG tablet Take 1 tablet by mouth daily Yes Tima Martínez MD   atorvastatin (LIPITOR) 80 MG tablet Take 1 tablet by mouth daily Yes Tima Martínez MD   naltrexone (DEPADE) 50 MG tablet Take 1 tablet by mouth daily Yes Tima Martínez MD   albuterol (PROVENTIL) (2.5 MG/3ML) 0.083% nebulizer solution Take 3 mLs by nebulization every 6 hours as needed for Wheezing or Shortness of Breath Yes Tima Martínez MD   albuterol-ipratropium (COMBIVENT RESPIMAT)  MCG/ACT AERS inhaler Inhale 1 puff into the lungs every 6 hours as needed for Wheezing Yes Tima Martínez MD   vitamin B-1 (THIAMINE) 100 MG tablet Take 1 tablet by mouth daily Yes Tima Martínez MD   [facility]  Tima Martínez MD   Nutritional Supplements [facility] ACTIVE) LIQD 1 can 3 times per day  Patient not taking: Reported on 11/5/2019  Tima Martínez MD   Spacer/Aero Chamber Mouthpiece [address] [facility] 1 each by Does not apply route once as needed (SOB)  Tima Martínez MD   Multiple Vitamins-Minerals (THERAPEUTIC MULTIVITAMIN-MINERALS) tablet Take 1 tablet by mouth daily  Tima Martínez MD   UNABLE TO FIND Shingles vaccine  Tima Martínez MD   calcium carbonate-vitamin D (CALCIUM 600 + D) 600-400 MG-UNIT TABS per tab Twice daily by mouth  Tima Martínez MD   aspirin 325 MG tablet Take 325 mg by mouth every morning. Over The Counter , Last Dose Taken 11-9-12 Due To Scheduled Procedure  Historical Provider, MD   OXYGEN by Nasal route nightly.  Oxygen 2 liters per minute Historical Provider, MD       Social History     Tobacco Use    Smoking status: Current Every Day Smoker     Packs/day: 1.00     Years: 61.00     Pack years: 61.00     Types: Cigarettes    Smokeless tobacco: Never Used   Substance Use Topics    Alcohol use: Yes     Alcohol/week: 0.0 standard drinks     Comment: \"Beer Every Day\"    Drug use: No        Allergies   Allergen Reactions    Pcn [Penicillins] Other (See Comments)     \"Been so long ago, that I forgot. \"    Thiazide-Type Diuretics      low sodium   ,   Past Medical History:   Diagnosis Date    AAA [facility]abdominal aortic aneurysm) ([facility])     Alcohol abuse     4 beers daily    Arthritis     Asthma     COPD (chronic obstructive pulmonary disease) ([address]     History of breast cancer Dx 12-06    Lumpectomy Right Breast For Cancer, Had Radiation Treatments    Hx of blood clots     HX OTHER MEDICAL     Primary Care Physician Is Dr. Toya Schroeder    Hyperlipidemia     high HDL    Hypertension     [facility], [facility]     On home oxygen therapy     At Night 2 Liters Per Nasal Cannula    Osteopenia     Peripheral vascular disease ([facility] Dx in 5-2008    Presbyopia 12/10/2019   ,   Past Surgical History:   Procedure Laterality Date    ABDOMEN SURGERY  1970's    \"Surgery For Adhesions\"    ABDOMINAL AORTIC ANEURYSM REPAIR, ENDOVASCULAR  11/13/2012    BREAST BIOPSY  5/2013    benign    BREAST LUMPECTOMY  12-06    Lumpectomy Right Breast For Cancer, Had Radiation  Treatments    [facility]    DENTAL SURGERY      Teeth Extracted In Past    FEMORAL BYPASS  4-09    \"Both Legs\"    FRACTURE SURGERY  Early 1980's    Broken Left Arm    HYSTERECTOMY, TOTAL ABDOMINAL  1980's       PHYSICAL EXAMINATION:  [ INSTRUCTIONS:  \"[x]\" Indicates a positive item  \"[]\" Indicates a negative item  -- DELETE ALL ITEMS NOT EXAMINED]  Vital Signs: (As obtained by patient/caregiver or practitioner observation)    Blood pressure-  Heart rate-    Respiratory rate-    Temperature-  Pulse oximetry-     Constitutional: [x] Appears well-developed and well-nourished [x] No apparent distress patient is confused by the virtual visit which is not unusual from my patients who are in their [de-identified]. Most of my interaction for the video visit was with the granddaughter as she did not want to talk about her grandmother's drinking in front of her.    [] Abnormal-   Mental status  [] Alert and awake  [] Oriented to person/place/time [x]Able to follow commands      Eyes:  EOM    []  Normal  [] Abnormal-  Sclera  []  Normal  [] Abnormal -         Discharge []  None visible  [] Abnormal -    HENT:   [x] Normocephalic, atraumatic. [] Abnormal   [] Mouth/Throat: Mucous membranes are moist.     External Ears [] Normal  [] Abnormal-     Neck: [x] No visualized mass     Pulmonary/Chest: [x] Respiratory effort normal.  [x] No visualized signs of difficulty breathing or respiratory distress        [] Abnormal-      Musculoskeletal:   [] Normal gait with no signs of ataxia         [] Normal range of motion of neck        [] Abnormal-       Neurological:        [] No Facial Asymmetry (Cranial nerve 7 motor function) (limited exam to video visit)          [] No gaze palsy        [] Abnormal-         Skin:        [x] No significant exanthematous lesions or discoloration noted on facial skin         [] Abnormal-            Psychiatric:       [x] Normal Affect [x] No Hallucinations        [] Abnormal-     Other pertinent observable physical exam findings-     ASSESSMENT/PLAN:   Diagnosis Orders   1. Alcohol abuse  folic acid (FOLVITE) 1 MG tablet    naltrexone (DEPADE) 50 MG tablet   2. Moderate COPD (chronic obstructive pulmonary disease) (HCC)  fluticasone propionate (FLOVENT DISKUS) 250 MCG/BLIST AEPB inhaler    albuterol (PROVENTIL) (2.5 MG/3ML) 0.083% nebulizer solution   3.  Peripheral vascular disease (HCC)  clopidogrel (PLAVIX) 75 MG tablet    atorvastatin (LIPITOR) 80 MG tablet     Encourage granddaughter to wean down the beer consumption. Can go from 6 beers per day down to 5 beers per day. Cut back by a beer weekly. Will give naltrexone to see if we can help to curb her appetite for drinking    COPD seems to be moderate. 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 Return in about 13 weeks (around 10/8/2020) for Rosibel. Rudy Juarez is a 78 y.o. female being evaluated by a Virtual Visit (video visit) encounter to address concerns as mentioned above. A caregiver was present when appropriate. Due to this being a TeleHealth encounter (During DJBIY-66 public health emergency), evaluation of the following organ systems was limited: Vitals/Constitutional/EENT/Resp/CV/GI//MS/Neuro/Skin/Heme-Lymph-Imm. Pursuant to the emergency declaration under the [address] [place] [address], [address] [place] [address] authority and the [employer] and [employer] Act, this Virtual Visit was conducted with patient's (and/or legal guardian's) consent, to reduce the patient's risk of exposure to COVID-19 and provide necessary medical care. The patient (and/or legal guardian) has also been advised to contact this office for worsening conditions or problems, and seek emergency medical treatment and/or call 911 if deemed necessary. Patient identification was verified at the start of the visit: Yes    Total time spent on this encounter: Not billed by time    Services were provided through a video synchronous discussion virtually to substitute for in-person clinic visit. Patient and provider were located at their individual homes. --Cathy Knight MD on 7/9/2020 at 6:09 PM    An electronic signature was used to authenticate this note.

## 2020-07-17 PROBLEM — R39.81 FUNCTIONAL URINARY INCONTINENCE: Status: ACTIVE | Noted: 2020-07-17

## 2020-07-17 PROBLEM — R26.81 UNSTEADY GAIT: Status: ACTIVE | Noted: 2020-07-17

## 2020-07-30 ENCOUNTER — TELEPHONE (OUTPATIENT)
Dept: FAMILY MEDICINE CLINIC | Age: 80
End: 2020-07-30

## 2020-07-30 RX ORDER — ALBUTEROL SULFATE 90 UG/1
2 AEROSOL, METERED RESPIRATORY (INHALATION) EVERY 4 HOURS PRN
Qty: 3 INHALER | Refills: 3 | Status: SHIPPED | OUTPATIENT
Start: 2020-07-30 | End: 2020-10-21 | Stop reason: SDUPTHER

## 2020-07-30 NOTE — TELEPHONE ENCOUNTER
Let her granddaughter know that the Combivent inhaler is no longer being covered by the insurance. The Combivent was a combination of both Atrovent and albuterol rescue inhaler. Therefore I had to write the inhalers as 2 separate prescription. The Atrovent is to be taken 4 times a day no matter what. It is not a rescue inhaler. The albuterol rescue inhaler is to be used only if she isshort of breath.

## 2020-09-30 ENCOUNTER — APPOINTMENT (OUTPATIENT)
Dept: CT IMAGING | Age: 80
DRG: 191 | End: 2020-09-30
Payer: COMMERCIAL

## 2020-09-30 ENCOUNTER — HOSPITAL ENCOUNTER (INPATIENT)
Age: 80
LOS: 5 days | Discharge: HOME HEALTH | DRG: 191 | End: 2020-10-05
Attending: INTERNAL MEDICINE | Admitting: INTERNAL MEDICINE
Payer: COMMERCIAL

## 2020-09-30 ENCOUNTER — APPOINTMENT (OUTPATIENT)
Dept: NUCLEAR MEDICINE | Age: 80
DRG: 191 | End: 2020-09-30
Payer: COMMERCIAL

## 2020-09-30 ENCOUNTER — APPOINTMENT (OUTPATIENT)
Dept: GENERAL RADIOLOGY | Age: 80
DRG: 191 | End: 2020-09-30
Payer: COMMERCIAL

## 2020-09-30 PROBLEM — J44.1 COPD WITH ACUTE EXACERBATION (HCC): Status: ACTIVE | Noted: 2020-09-30

## 2020-09-30 LAB
ALBUMIN SERPL-MCNC: 3.9 GM/DL (ref 3.4–5)
ALP BLD-CCNC: 97 IU/L (ref 40–128)
ALT SERPL-CCNC: 8 U/L (ref 10–40)
ANION GAP SERPL CALCULATED.3IONS-SCNC: 11 MMOL/L (ref 4–16)
AST SERPL-CCNC: 16 IU/L (ref 15–37)
BASOPHILS ABSOLUTE: 0 K/CU MM
BASOPHILS RELATIVE PERCENT: 0.4 % (ref 0–1)
BILIRUB SERPL-MCNC: 0.5 MG/DL (ref 0–1)
BUN BLDV-MCNC: 12 MG/DL (ref 6–23)
CALCIUM SERPL-MCNC: 9 MG/DL (ref 8.3–10.6)
CHLORIDE BLD-SCNC: 96 MMOL/L (ref 99–110)
CO2: 28 MMOL/L (ref 21–32)
CREAT SERPL-MCNC: 0.8 MG/DL (ref 0.6–1.1)
D DIMER: 1955 NG/ML(DDU)
DIFFERENTIAL TYPE: ABNORMAL
EKG ATRIAL RATE: 82 BPM
EKG DIAGNOSIS: NORMAL
EKG P AXIS: 84 DEGREES
EKG P-R INTERVAL: 154 MS
EKG Q-T INTERVAL: 410 MS
EKG QRS DURATION: 66 MS
EKG QTC CALCULATION (BAZETT): 479 MS
EKG R AXIS: 47 DEGREES
EKG T AXIS: -77 DEGREES
EKG VENTRICULAR RATE: 82 BPM
EOSINOPHILS ABSOLUTE: 0.1 K/CU MM
EOSINOPHILS RELATIVE PERCENT: 1.2 % (ref 0–3)
GFR AFRICAN AMERICAN: >60 ML/MIN/1.73M2
GFR NON-AFRICAN AMERICAN: >60 ML/MIN/1.73M2
GLUCOSE BLD-MCNC: 135 MG/DL (ref 70–99)
HCT VFR BLD CALC: 40.9 % (ref 37–47)
HEMOGLOBIN: 13.8 GM/DL (ref 12.5–16)
IMMATURE NEUTROPHIL %: 0.8 % (ref 0–0.43)
LV EF: 60 %
LV EF: 70 %
LVEF MODALITY: NORMAL
LVEF MODALITY: NORMAL
LYMPHOCYTES ABSOLUTE: 1 K/CU MM
LYMPHOCYTES RELATIVE PERCENT: 19.2 % (ref 24–44)
MAGNESIUM: 1.6 MG/DL (ref 1.8–2.4)
MCH RBC QN AUTO: 32 PG (ref 27–31)
MCHC RBC AUTO-ENTMCNC: 33.7 % (ref 32–36)
MCV RBC AUTO: 94.9 FL (ref 78–100)
MONOCYTES ABSOLUTE: 0.7 K/CU MM
MONOCYTES RELATIVE PERCENT: 13.7 % (ref 0–4)
NUCLEATED RBC %: 0 %
PDW BLD-RTO: 15.2 % (ref 11.7–14.9)
PLATELET # BLD: 279 K/CU MM (ref 140–440)
PMV BLD AUTO: 8.5 FL (ref 7.5–11.1)
POTASSIUM SERPL-SCNC: 4.7 MMOL/L (ref 3.5–5.1)
PRO-BNP: 2572 PG/ML
RBC # BLD: 4.31 M/CU MM (ref 4.2–5.4)
SEGMENTED NEUTROPHILS ABSOLUTE COUNT: 3.3 K/CU MM
SEGMENTED NEUTROPHILS RELATIVE PERCENT: 64.7 % (ref 36–66)
SODIUM BLD-SCNC: 135 MMOL/L (ref 135–145)
TOTAL IMMATURE NEUTOROPHIL: 0.04 K/CU MM
TOTAL NUCLEATED RBC: 0 K/CU MM
TOTAL PROTEIN: 6.5 GM/DL (ref 6.4–8.2)
TROPONIN T: <0.01 NG/ML
TROPONIN T: <0.01 NG/ML
WBC # BLD: 5 K/CU MM (ref 4–10.5)

## 2020-09-30 PROCEDURE — 99221 1ST HOSP IP/OBS SF/LOW 40: CPT | Performed by: INTERNAL MEDICINE

## 2020-09-30 PROCEDURE — 93017 CV STRESS TEST TRACING ONLY: CPT

## 2020-09-30 PROCEDURE — 6360000002 HC RX W HCPCS: Performed by: INTERNAL MEDICINE

## 2020-09-30 PROCEDURE — A9500 TC99M SESTAMIBI: HCPCS | Performed by: INTERNAL MEDICINE

## 2020-09-30 PROCEDURE — 93010 ELECTROCARDIOGRAM REPORT: CPT | Performed by: INTERNAL MEDICINE

## 2020-09-30 PROCEDURE — 85025 COMPLETE CBC W/AUTO DIFF WBC: CPT

## 2020-09-30 PROCEDURE — 2580000003 HC RX 258: Performed by: INTERNAL MEDICINE

## 2020-09-30 PROCEDURE — 80053 COMPREHEN METABOLIC PANEL: CPT

## 2020-09-30 PROCEDURE — 36415 COLL VENOUS BLD VENIPUNCTURE: CPT

## 2020-09-30 PROCEDURE — 93005 ELECTROCARDIOGRAM TRACING: CPT | Performed by: PHYSICIAN ASSISTANT

## 2020-09-30 PROCEDURE — 83880 ASSAY OF NATRIURETIC PEPTIDE: CPT

## 2020-09-30 PROCEDURE — 3430000000 HC RX DIAGNOSTIC RADIOPHARMACEUTICAL: Performed by: INTERNAL MEDICINE

## 2020-09-30 PROCEDURE — 6370000000 HC RX 637 (ALT 250 FOR IP): Performed by: INTERNAL MEDICINE

## 2020-09-30 PROCEDURE — 94640 AIRWAY INHALATION TREATMENT: CPT

## 2020-09-30 PROCEDURE — 83735 ASSAY OF MAGNESIUM: CPT

## 2020-09-30 PROCEDURE — 94760 N-INVAS EAR/PLS OXIMETRY 1: CPT

## 2020-09-30 PROCEDURE — 2700000000 HC OXYGEN THERAPY PER DAY

## 2020-09-30 PROCEDURE — 1200000000 HC SEMI PRIVATE

## 2020-09-30 PROCEDURE — 84484 ASSAY OF TROPONIN QUANT: CPT

## 2020-09-30 PROCEDURE — 85379 FIBRIN DEGRADATION QUANT: CPT

## 2020-09-30 PROCEDURE — 6370000000 HC RX 637 (ALT 250 FOR IP): Performed by: PHYSICIAN ASSISTANT

## 2020-09-30 PROCEDURE — 99285 EMERGENCY DEPT VISIT HI MDM: CPT

## 2020-09-30 PROCEDURE — 93306 TTE W/DOPPLER COMPLETE: CPT

## 2020-09-30 PROCEDURE — 71045 X-RAY EXAM CHEST 1 VIEW: CPT

## 2020-09-30 PROCEDURE — 94761 N-INVAS EAR/PLS OXIMETRY MLT: CPT

## 2020-09-30 PROCEDURE — 6360000004 HC RX CONTRAST MEDICATION: Performed by: FAMILY MEDICINE

## 2020-09-30 PROCEDURE — 78452 HT MUSCLE IMAGE SPECT MULT: CPT

## 2020-09-30 PROCEDURE — 71275 CT ANGIOGRAPHY CHEST: CPT

## 2020-09-30 RX ORDER — FLUTICASONE PROPIONATE 110 UG/1
4 AEROSOL, METERED RESPIRATORY (INHALATION) 2 TIMES DAILY
Status: DISCONTINUED | OUTPATIENT
Start: 2020-09-30 | End: 2020-10-05 | Stop reason: HOSPADM

## 2020-09-30 RX ORDER — ALBUTEROL SULFATE 90 UG/1
2 AEROSOL, METERED RESPIRATORY (INHALATION) ONCE
Status: COMPLETED | OUTPATIENT
Start: 2020-09-30 | End: 2020-09-30

## 2020-09-30 RX ORDER — IPRATROPIUM BROMIDE AND ALBUTEROL SULFATE 2.5; .5 MG/3ML; MG/3ML
1 SOLUTION RESPIRATORY (INHALATION) ONCE
Status: COMPLETED | OUTPATIENT
Start: 2020-09-30 | End: 2020-09-30

## 2020-09-30 RX ORDER — LORAZEPAM 1 MG/1
3 TABLET ORAL
Status: DISCONTINUED | OUTPATIENT
Start: 2020-09-30 | End: 2020-10-03

## 2020-09-30 RX ORDER — SODIUM CHLORIDE 0.9 % (FLUSH) 0.9 %
10 SYRINGE (ML) INJECTION PRN
Status: DISCONTINUED | OUTPATIENT
Start: 2020-09-30 | End: 2020-10-05 | Stop reason: HOSPADM

## 2020-09-30 RX ORDER — LORAZEPAM 2 MG/ML
2 INJECTION INTRAMUSCULAR
Status: DISCONTINUED | OUTPATIENT
Start: 2020-09-30 | End: 2020-10-03

## 2020-09-30 RX ORDER — LORAZEPAM 1 MG/1
2 TABLET ORAL
Status: DISCONTINUED | OUTPATIENT
Start: 2020-09-30 | End: 2020-10-03

## 2020-09-30 RX ORDER — CLOPIDOGREL BISULFATE 75 MG/1
75 TABLET ORAL DAILY
Status: DISCONTINUED | OUTPATIENT
Start: 2020-09-30 | End: 2020-10-05 | Stop reason: HOSPADM

## 2020-09-30 RX ORDER — PREDNISONE 20 MG/1
40 TABLET ORAL ONCE
Status: COMPLETED | OUTPATIENT
Start: 2020-09-30 | End: 2020-09-30

## 2020-09-30 RX ORDER — SODIUM CHLORIDE 0.9 % (FLUSH) 0.9 %
10 SYRINGE (ML) INJECTION EVERY 12 HOURS SCHEDULED
Status: DISCONTINUED | OUTPATIENT
Start: 2020-09-30 | End: 2020-10-05 | Stop reason: HOSPADM

## 2020-09-30 RX ORDER — ONDANSETRON 2 MG/ML
4 INJECTION INTRAMUSCULAR; INTRAVENOUS EVERY 6 HOURS PRN
Status: DISCONTINUED | OUTPATIENT
Start: 2020-09-30 | End: 2020-10-05 | Stop reason: HOSPADM

## 2020-09-30 RX ORDER — GUAIFENESIN 600 MG/1
600 TABLET, EXTENDED RELEASE ORAL 2 TIMES DAILY
Status: DISCONTINUED | OUTPATIENT
Start: 2020-09-30 | End: 2020-10-05 | Stop reason: HOSPADM

## 2020-09-30 RX ORDER — ASPIRIN 81 MG/1
324 TABLET, CHEWABLE ORAL ONCE
Status: COMPLETED | OUTPATIENT
Start: 2020-09-30 | End: 2020-09-30

## 2020-09-30 RX ORDER — LORAZEPAM 2 MG/ML
1 INJECTION INTRAMUSCULAR
Status: DISCONTINUED | OUTPATIENT
Start: 2020-09-30 | End: 2020-10-03

## 2020-09-30 RX ORDER — PROMETHAZINE HYDROCHLORIDE 12.5 MG/1
12.5 TABLET ORAL EVERY 6 HOURS PRN
Status: DISCONTINUED | OUTPATIENT
Start: 2020-09-30 | End: 2020-10-05 | Stop reason: HOSPADM

## 2020-09-30 RX ORDER — LORAZEPAM 1 MG/1
1 TABLET ORAL
Status: DISCONTINUED | OUTPATIENT
Start: 2020-09-30 | End: 2020-10-03

## 2020-09-30 RX ORDER — METHYLPREDNISOLONE SODIUM SUCCINATE 40 MG/ML
40 INJECTION, POWDER, LYOPHILIZED, FOR SOLUTION INTRAMUSCULAR; INTRAVENOUS EVERY 6 HOURS
Status: DISCONTINUED | OUTPATIENT
Start: 2020-09-30 | End: 2020-10-01

## 2020-09-30 RX ORDER — NICOTINE 21 MG/24HR
1 PATCH, TRANSDERMAL 24 HOURS TRANSDERMAL DAILY
Status: DISCONTINUED | OUTPATIENT
Start: 2020-09-30 | End: 2020-10-05 | Stop reason: HOSPADM

## 2020-09-30 RX ORDER — ACETAMINOPHEN 650 MG/1
650 SUPPOSITORY RECTAL EVERY 6 HOURS PRN
Status: DISCONTINUED | OUTPATIENT
Start: 2020-09-30 | End: 2020-10-05 | Stop reason: HOSPADM

## 2020-09-30 RX ORDER — PANTOPRAZOLE SODIUM 40 MG/1
40 TABLET, DELAYED RELEASE ORAL
Status: DISCONTINUED | OUTPATIENT
Start: 2020-09-30 | End: 2020-10-05 | Stop reason: HOSPADM

## 2020-09-30 RX ORDER — MAGNESIUM SULFATE 1 G/100ML
1 INJECTION INTRAVENOUS PRN
Status: DISCONTINUED | OUTPATIENT
Start: 2020-09-30 | End: 2020-10-05 | Stop reason: HOSPADM

## 2020-09-30 RX ORDER — IPRATROPIUM BROMIDE AND ALBUTEROL SULFATE 2.5; .5 MG/3ML; MG/3ML
1 SOLUTION RESPIRATORY (INHALATION)
Status: DISCONTINUED | OUTPATIENT
Start: 2020-09-30 | End: 2020-10-05 | Stop reason: HOSPADM

## 2020-09-30 RX ORDER — LORAZEPAM 1 MG/1
4 TABLET ORAL
Status: DISCONTINUED | OUTPATIENT
Start: 2020-09-30 | End: 2020-10-03

## 2020-09-30 RX ORDER — LORAZEPAM 2 MG/ML
3 INJECTION INTRAMUSCULAR
Status: DISCONTINUED | OUTPATIENT
Start: 2020-09-30 | End: 2020-10-03

## 2020-09-30 RX ORDER — ATORVASTATIN CALCIUM 80 MG/1
80 TABLET, FILM COATED ORAL DAILY
Status: DISCONTINUED | OUTPATIENT
Start: 2020-09-30 | End: 2020-10-05 | Stop reason: HOSPADM

## 2020-09-30 RX ORDER — MULTIVITAMIN WITH IRON
1 TABLET ORAL DAILY
Status: DISCONTINUED | OUTPATIENT
Start: 2020-09-30 | End: 2020-10-05 | Stop reason: HOSPADM

## 2020-09-30 RX ORDER — FOLIC ACID 1 MG/1
1 TABLET ORAL DAILY
Status: DISCONTINUED | OUTPATIENT
Start: 2020-09-30 | End: 2020-10-05 | Stop reason: HOSPADM

## 2020-09-30 RX ORDER — AMINOPHYLLINE DIHYDRATE 25 MG/ML
75 INJECTION, SOLUTION INTRAVENOUS ONCE
Status: COMPLETED | OUTPATIENT
Start: 2020-09-30 | End: 2020-09-30

## 2020-09-30 RX ORDER — THIAMINE MONONITRATE (VIT B1) 100 MG
100 TABLET ORAL DAILY
Status: DISCONTINUED | OUTPATIENT
Start: 2020-09-30 | End: 2020-10-05 | Stop reason: HOSPADM

## 2020-09-30 RX ORDER — ACETAMINOPHEN 325 MG/1
650 TABLET ORAL EVERY 6 HOURS PRN
Status: DISCONTINUED | OUTPATIENT
Start: 2020-09-30 | End: 2020-10-05 | Stop reason: HOSPADM

## 2020-09-30 RX ORDER — POLYETHYLENE GLYCOL 3350 17 G/17G
17 POWDER, FOR SOLUTION ORAL DAILY PRN
Status: DISCONTINUED | OUTPATIENT
Start: 2020-09-30 | End: 2020-10-05 | Stop reason: HOSPADM

## 2020-09-30 RX ORDER — ASPIRIN 81 MG/1
81 TABLET, CHEWABLE ORAL DAILY
Status: DISCONTINUED | OUTPATIENT
Start: 2020-10-01 | End: 2020-10-05 | Stop reason: HOSPADM

## 2020-09-30 RX ORDER — LORAZEPAM 2 MG/ML
4 INJECTION INTRAMUSCULAR
Status: DISCONTINUED | OUTPATIENT
Start: 2020-09-30 | End: 2020-10-03

## 2020-09-30 RX ORDER — ATORVASTATIN CALCIUM 40 MG/1
40 TABLET, FILM COATED ORAL NIGHTLY
Status: DISCONTINUED | OUTPATIENT
Start: 2020-09-30 | End: 2020-09-30

## 2020-09-30 RX ADMIN — IPRATROPIUM BROMIDE AND ALBUTEROL SULFATE 1 AMPULE: .5; 3 SOLUTION RESPIRATORY (INHALATION) at 04:45

## 2020-09-30 RX ADMIN — IOPAMIDOL 75 ML: 755 INJECTION, SOLUTION INTRAVENOUS at 20:51

## 2020-09-30 RX ADMIN — SODIUM CHLORIDE, PRESERVATIVE FREE 10 ML: 5 INJECTION INTRAVENOUS at 13:29

## 2020-09-30 RX ADMIN — ATORVASTATIN CALCIUM 80 MG: 80 TABLET, FILM COATED ORAL at 13:28

## 2020-09-30 RX ADMIN — Medication 100 MG: at 13:27

## 2020-09-30 RX ADMIN — FOLIC ACID 1 MG: 1 TABLET ORAL at 13:28

## 2020-09-30 RX ADMIN — PANTOPRAZOLE SODIUM 40 MG: 40 TABLET, DELAYED RELEASE ORAL at 13:28

## 2020-09-30 RX ADMIN — GUAIFENESIN 600 MG: 600 TABLET, EXTENDED RELEASE ORAL at 13:28

## 2020-09-30 RX ADMIN — ALBUTEROL SULFATE 2 PUFF: 90 AEROSOL, METERED RESPIRATORY (INHALATION) at 03:14

## 2020-09-30 RX ADMIN — METHYLPREDNISOLONE SODIUM SUCCINATE 40 MG: 40 INJECTION, POWDER, FOR SOLUTION INTRAMUSCULAR; INTRAVENOUS at 13:27

## 2020-09-30 RX ADMIN — IPRATROPIUM BROMIDE AND ALBUTEROL SULFATE 1 AMPULE: .5; 3 SOLUTION RESPIRATORY (INHALATION) at 19:17

## 2020-09-30 RX ADMIN — SODIUM CHLORIDE, PRESERVATIVE FREE 10 ML: 5 INJECTION INTRAVENOUS at 21:25

## 2020-09-30 RX ADMIN — CLOPIDOGREL BISULFATE 75 MG: 75 TABLET ORAL at 13:26

## 2020-09-30 RX ADMIN — THERA TABS 1 TABLET: TAB at 13:26

## 2020-09-30 RX ADMIN — Medication 4 PUFF: at 19:18

## 2020-09-30 RX ADMIN — PREDNISONE 40 MG: 20 TABLET ORAL at 07:13

## 2020-09-30 RX ADMIN — GUAIFENESIN 600 MG: 600 TABLET, EXTENDED RELEASE ORAL at 21:23

## 2020-09-30 RX ADMIN — Medication 10 MILLICURIE: at 10:10

## 2020-09-30 RX ADMIN — ENOXAPARIN SODIUM 40 MG: 40 INJECTION SUBCUTANEOUS at 13:27

## 2020-09-30 RX ADMIN — REGADENOSON 0.4 MG: 0.08 INJECTION, SOLUTION INTRAVENOUS at 11:28

## 2020-09-30 RX ADMIN — Medication 30 MILLICURIE: at 11:30

## 2020-09-30 RX ADMIN — AMINOPHYLLINE 75 MG: 25 INJECTION, SOLUTION INTRAVENOUS at 11:34

## 2020-09-30 RX ADMIN — METHYLPREDNISOLONE SODIUM SUCCINATE 40 MG: 40 INJECTION, POWDER, FOR SOLUTION INTRAMUSCULAR; INTRAVENOUS at 21:25

## 2020-09-30 RX ADMIN — ASPIRIN 81 MG CHEWABLE TABLET 324 MG: 81 TABLET CHEWABLE at 07:13

## 2020-09-30 ASSESSMENT — PAIN SCALES - GENERAL: PAINLEVEL_OUTOF10: 0

## 2020-09-30 NOTE — CONSULTS
Chart reviewed  Full note to follow  H/o PVD, HTN, hyperlipidimea  Has SOB and CP  Check cardiolite and echo                      Name:  Matt Ivy /Age/Sex: 1940  (78 y.o. female)   MRN & CSN:  [id number] & [id number] Admission Date/Time: 2020  2:55 AM   Location:  [address] [place] [address] PCP: Rach Henson MD       Hospital Day: 1          Referring physician:  Genesis Herrera MD         Reason for consultation:  CP        Thanks for referral.    Information source: patient    CC;  SOB      HPI:   Thank you for involving me in taking  care of Matt Ivy who  is a 78 y. o.year  Old female  Presents with  H/o COPD, PVD , HTN, hyperlipidimea    Admitted with moderate exertional SOB , has elevated BNP.     ekg has nonsp st changes. Past medical history:    has a past medical history of AAA (abdominal aortic aneurysm) ([address] Alcohol abuse, Alcohol-induced persisting dementia ([address] Arthritis, Asthma, COPD (chronic obstructive pulmonary disease) ([address] History of breast cancer, Hx of blood clots, HX OTHER MEDICAL, Hyperlipidemia, Hypertension, Iliac artery aneurysm, bilateral ([address] On home oxygen therapy, Osteopenia, Peripheral vascular disease ([address] and Presbyopia. Past surgical history:   has a past surgical history that includes Breast lumpectomy (); Dental surgery; Hysterectomy, total abdominal (); femoral bypass (); Abdomen surgery (); Colonoscopy (); fracture surgery (Early ); AAA repair, endovascular (2012); and Breast biopsy (2013). Social History:   reports that she has been smoking cigarettes. She has a 61.00 pack-year smoking history. She has never used smokeless tobacco. She reports current alcohol use. She reports that she does not use drugs.   Family history:  family history includes Colon Cancer in her father; Coronary Art Dis in her brother and sister; Early Death in her brother, brother, and brother; Early Death (age of onset: 1) in her son; Early Death (age of onset: 61) in her mother; Heart Attack in her brother; Heart Disease in her brother and sister; High Blood Pressure in her sister; High Cholesterol in her sister; Kidney Disease in her sister; Other in her daughter and sister; Vision Loss in her sister. Allergies   Allergen Reactions    Pcn [Penicillins] Other (See Comments)     \"Been so long ago, that I forgot. \"    Thiazide-Type Diuretics      low sodium       sodium chloride flush 0.9 % injection 10 mL, 2 times per day  sodium chloride flush 0.9 % injection 10 mL, PRN  acetaminophen (TYLENOL) tablet 650 mg, Q6H PRN    Or  acetaminophen (TYLENOL) suppository 650 mg, Q6H PRN  polyethylene glycol (GLYCOLAX) packet 17 g, Daily PRN  promethazine (PHENERGAN) tablet 12.5 mg, Q6H PRN    Or  ondansetron (ZOFRAN) injection 4 mg, Q6H PRN  [START ON 10/1/2020] aspirin chewable tablet 81 mg, Daily  enoxaparin (LOVENOX) injection 40 mg, Daily  methylPREDNISolone sodium (SOLU-MEDROL) injection 40 mg, Q6H  ipratropium-albuterol (DUONEB) nebulizer solution 1 ampule, Q4H WA  guaiFENesin (MUCINEX) extended release tablet 600 mg, BID  LORazepam (ATIVAN) tablet 1 mg, Q1H PRN    Or  LORazepam (ATIVAN) injection 1 mg, Q1H PRN    Or  LORazepam (ATIVAN) tablet 2 mg, Q1H PRN    Or  LORazepam (ATIVAN) injection 2 mg, Q1H PRN    Or  LORazepam (ATIVAN) tablet 3 mg, Q1H PRN    Or  LORazepam (ATIVAN) injection 3 mg, Q1H PRN    Or  LORazepam (ATIVAN) tablet 4 mg, Q1H PRN    Or  LORazepam (ATIVAN) injection 4 mg, Q1H PRN  vitamin B-1 (THIAMINE) tablet 100 mg, Daily  multivitamin 1 tablet, Daily  nicotine (NICODERM CQ) 21 MG/24HR 1 patch, Daily  atorvastatin (LIPITOR) tablet 80 mg, Daily  clopidogrel (PLAVIX) tablet 75 mg, Daily  fluticasone (FLOVENT HFA) 110 MCG/ACT inhaler 4 puff, BID  folic acid (FOLVITE) tablet 1 mg, Daily  pantoprazole (PROTONIX) tablet 40 mg, QAM TAJ      Current Facility-Administered Medications   Medication Dose Route Frequency Provider Last Rate Last Dose    sodium chloride flush 0.9 % injection 10 mL  10 mL Intravenous 2 times per day Lord Willa MD   10 mL at 09/30/20 1329    sodium chloride flush 0.9 % injection 10 mL  10 mL Intravenous PRN Lord Willa MD        acetaminophen (TYLENOL) tablet 650 mg  650 mg Oral Q6H PRN Lord Willa MD        Or   Leodan Calhoun acetaminophen (TYLENOL) suppository 650 mg  650 mg Rectal Q6H PRN Lord Willa MD        polyethylene glycol (GLYCOLAX) packet 17 g  17 g Oral Daily PRN Lord Willa MD        promethazine (PHENERGAN) tablet 12.5 mg  12.5 mg Oral Q6H PRN Lord Willa MD        Or    ondansetron (ZOFRAN) injection 4 mg  4 mg Intravenous Q6H PRN MD Leodan Hopper [START ON 10/1/2020] aspirin chewable tablet 81 mg  81 mg Oral Daily Lord Willa MD        enoxaparin (LOVENOX) injection 40 mg  40 mg Subcutaneous Daily Lord Willa MD   40 mg at 09/30/20 1327    methylPREDNISolone sodium (SOLU-MEDROL) injection 40 mg  40 mg Intravenous Q6H Lord Willa MD   40 mg at 09/30/20 1327    ipratropium-albuterol (DUONEB) nebulizer solution 1 ampule  1 ampule Inhalation Q4H WA Lord Willa MD   Stopped at 09/30/20 1111    guaiFENesin [facility]) extended release tablet 600 mg  600 mg Oral BID Lord Willa MD   600 mg at 09/30/20 1328    LORazepam (ATIVAN) tablet 1 mg  1 mg Oral Q1H PRN Lord Willa MD        Or    LORazepam (ATIVAN) injection 1 mg  1 mg Intravenous Q1H PRN Lord Willa MD        Or    LORazepam (ATIVAN) tablet 2 mg  2 mg Oral Q1H PRN Lord Willa MD        Or    LORazepam (ATIVAN) injection 2 mg  2 mg Intravenous Q1H PRN Lord Willa MD        Or    LORazepam (ATIVAN) tablet 3 mg  3 mg Oral Q1H PRN Lord Willa MD        Or    LORazepam (ATIVAN) injection 3 mg  3 mg Intravenous Q1H PRN Lord Willa MD        Or    LORazepam (ATIVAN) tablet 4 mg  4 mg Oral Q1H PRN Genesis Herrera MD        Or    LORazepam (ATIVAN) injection 4 mg  4 mg Intravenous Q1H PRN Genesis Herrera MD        vitamin B-1 (THIAMINE) tablet 100 mg  100 mg Oral Daily Genesis Herrera MD   100 mg at 09/30/20 1327    multivitamin 1 tablet  1 tablet Oral Daily Genesis Herrera MD   1 tablet at 09/30/20 1326    nicotine (NICODERM CQ) 21 MG/24HR 1 patch  1 patch Transdermal Daily Genesis Herrera MD   1 patch at 09/30/20 1326    atorvastatin (LIPITOR) tablet 80 mg  80 mg Oral Daily Genesis Herrera MD   80 mg at 09/30/20 1328    clopidogrel (PLAVIX) tablet 75 mg  75 mg Oral Daily Genesis Herrera MD   75 mg at 09/30/20 1326    fluticasone (FLOVENT HFA) 110 MCG/ACT inhaler 4 puff  4 puff Inhalation BID Genesis Herrera MD   Stopped at 21/71/23 4768    folic acid (FOLVITE) tablet 1 mg  1 mg Oral Daily Genesis Herrera MD   1 mg at 09/30/20 1328    pantoprazole (PROTONIX) tablet 40 mg  40 mg Oral QAM AC Genesis Herrera MD   40 mg at 09/30/20 1328     Review of Systems:  All 14 systems reviewed, all negative except for  CP, SOB    Physical Examination:    /85   Pulse 69   Temp 98.7 °F (37.1 °C) (Oral)   Resp 14   Ht 5' 7\" (1.702 m)   Wt 91 lb 11.2 oz (41.6 kg)   LMP  (LMP Unknown)   SpO2 100%   BMI 14.36 kg/m²      Wt Readings from Last 3 Encounters:   09/30/20 91 lb 11.2 oz (41.6 kg)   11/05/19 93 lb 9.6 oz (42.5 kg)   06/03/19 90 lb 9.6 oz (41.1 kg)     Body mass index is 14.36 kg/m².       General Appearance:  fair  Head: normocephalic     Eyes: normal, noninjected conjunctiva    ENT: normal mucosa, noninjected throat, normal     NECK: No JVP  No thyromegaly        Cardiovascular: No thrills palpated   Auscultation: Normal S1 and S2,  no murmur   carotid bruit no   Abdominal Aorta no bruit    Respiratory:    Breath sounds Clear = 0    Extremities:  none Edema clubbing ,   no cyanosis    SKIN: Warm and well perfused, no pallor or

## 2020-09-30 NOTE — ED PROVIDER NOTES
EKG per my interpretation demonstrates sinus rhythm with premature atrial complexes at a rate of 82 bpm.  Normal axis. Prolonged QTc interval 479. T wave inversions in the inferior leads which appear new when compared to previous EKG.       Tam Sol DO  09/30/20 6401

## 2020-09-30 NOTE — PROGRESS NOTES
now she states    Objective:   No intake or output data in the 24 hours ending 09/30/20 0910   Vitals:   Vitals:    09/30/20 0900   BP: 131/85   Pulse: 69   Resp: 14   Temp: 98.7 °F (37.1 °C)   SpO2: 100%     Physical Exam:   Gen:  awake, alert, no apparent distress  Head/Eyes:  Normocephalic atraumatic, EOMI   NECK:   symmetrical, trachea midline  LUNGS: Normal Effort   CARDIOVASCULAR:  Normal rate  ABDOMEN:  non distended  MUSCULOSKELETAL:  ROM WNL  NEUROLOGIC: Alert and Oriented,  Cranial nerves II-XII are grossly intact.    SKIN:  no bruising or bleeding, normal skin color,  no redness      Data:       CBC   Recent Labs     09/30/20  0338   WBC 5.0   HGB 13.8   HCT 40.9         BMP   Recent Labs     09/30/20  0338      K 4.7   CL 96*   CO2 28   BUN 12   CREATININE 0.8         Electronically signed by Danita Real MD on 9/30/2020 at 9:10 AM

## 2020-09-30 NOTE — H&P
beers per day, denies any illicit drug abuse   MEDICATIONS ALLERGIES    As per EMR-Atrovent, albuterol HFA, folic acid, Flovent, Plavix, atorvastatin, thiamine, multivitamins, aspirin 325  penicillin       PAST MEDICAL, SURGICAL, FAMILY, and SOCIAL HISTORY         Past Medical History:   Diagnosis Date    AAA (abdominal aortic aneurysm) (HCC)     Alcohol abuse     4 beers daily    Alcohol-induced persisting dementia ([address]     Arthritis     Asthma     COPD (chronic obstructive pulmonary disease) ([address]     History of breast cancer Dx     Lumpectomy Right Breast For Cancer, Had Radiation Treatments    Hx of blood clots     HX OTHER MEDICAL     Primary Care Physician Is Dr. Edison Cabrera    Hyperlipidemia     high HDL    Hypertension     Iliac artery aneurysm, bilateral ([address]     On home oxygen therapy     At Night 2 Liters Per Nasal Cannula    Osteopenia     Peripheral vascular disease ([facility] Dx in     Presbyopia 12/10/2019     Past Surgical History:   Procedure Laterality Date    ABDOMEN SURGERY  s    \"Surgery For Adhesions\"    ABDOMINAL AORTIC ANEURYSM REPAIR, ENDOVASCULAR  2012    BREAST BIOPSY  2013    benign    BREAST LUMPECTOMY      Lumpectomy Right Breast For Cancer, Had Radiation  Treatments    COLONOSCOPY  ,     Mukerjee    DENTAL SURGERY      Teeth Extracted In Past    FEMORAL BYPASS      \"Both Legs\"    FRACTURE SURGERY  Early     Broken Left Arm    HYSTERECTOMY, TOTAL ABDOMINAL       Family History   Problem Relation Age of Onset    Colon Cancer Father     Coronary Art Dis Sister     Heart Disease Sister     High Blood Pressure Sister     High Cholesterol Sister     Coronary Art Dis Brother     Heart Attack Brother          age 76    Heart Disease Brother     Early Death Brother     Other Sister         \"Both Legs Amputated\"    Kidney Disease Sister         \"Kidney Problems\"    Early Death Brother         Suicide- hung himself    Early Death Brother         Murdered in his 42's    Early Death Son 1         in a fire    Early Death Mother 61    Vision Loss Sister         \"Eye Problems\"    Other Daughter         \"Lung Problems\"     Family Hx of HTN  Family Hx as reviewed above, otherwise non-contributory  Social History     Socioeconomic History    Marital status:      Spouse name: Not on file    Number of children: Not on file    Years of education: Not on file    Highest education level: Not on file   Occupational History    Not on file   Social Needs    Financial resource strain: Not on file    Food insecurity     Worry: Not on file     Inability: Not on file   [employer] needs     Medical: Not on file     Non-medical: Not on file   Tobacco Use    Smoking status: Current Every Day Smoker     Packs/day: 1.00     Years: 61.00     Pack years: 61.00     Types: Cigarettes    Smokeless tobacco: Never Used   Substance and Sexual Activity    Alcohol use: Yes     Alcohol/week: 0.0 standard drinks     Comment: \"Beer Every Day\"    Drug use: No    Sexual activity: Never   Lifestyle    Physical activity     Days per week: Not on file     Minutes per session: Not on file    Stress: Not on file   Relationships    Social connections     Talks on phone: Not on file     Gets together: Not on file     Attends Protestant service: Not on file     Active member of club or organization: Not on file     Attends meetings of clubs or organizations: Not on file     Relationship status: Not on file    Intimate partner violence     Fear of current or ex partner: Not on file     Emotionally abused: Not on file     Physically abused: Not on file     Forced sexual activity: Not on file   Other Topics Concern    Not on file   Social History Narrative    Not on file       MEDICATIONS   Medications Prior to Admission  Not in a hospital admission.     Current Medications  Current Facility-Administered Medications Medication Dose Route Frequency Provider Last Rate Last Dose    aspirin chewable tablet 324 mg  324 mg Oral Once Shana Pike PA-C        predniSONE (DELTASONE) tablet 40 mg  40 mg Oral Once Shana Pike PA-C         Current Outpatient Medications   Medication Sig Dispense Refill    ipratropium (ATROVENT HFA) 17 MCG/ACT inhaler Inhale 1 puff into the lungs 4 times daily 3 Inhaler 3    albuterol sulfate HFA (PROAIR HFA) 108 (90 Base) MCG/ACT inhaler Inhale 2 puffs into the lungs every 4 hours as needed for Wheezing or Shortness of Breath 3 Inhaler 3    folic acid (FOLVITE) 1 MG tablet Take 1 tablet by mouth daily 90 tablet 3    fluticasone propionate (FLOVENT DISKUS) 250 MCG/BLIST AEPB inhaler 1 puff twice per day 1 each 5    clopidogrel (PLAVIX) 75 MG tablet Take 1 tablet by mouth daily 90 tablet 1    atorvastatin (LIPITOR) 80 MG tablet Take 1 tablet by mouth daily 90 tablet 1    naltrexone (DEPADE) 50 MG tablet Take 1 tablet by mouth daily 30 tablet 2    albuterol (PROVENTIL) (2.5 MG/3ML) 0.083% nebulizer solution Take 3 mLs by nebulization every 6 hours as needed for Wheezing or Shortness of Breath 360 mL 11    Nebulizers (MINI PLUS NEBULIZER) MISC Use 4 times per day as needed 1 each 0    vitamin B-1 (THIAMINE) 100 MG tablet Take 1 tablet by mouth daily 90 tablet 3    UNABLE TO FIND Shower Chair 1 each 0    Nutritional Supplements (ENSURE ACTIVE) LIQD 1 can 3 times per day (Patient not taking: Reported on 11/5/2019) 90 Bottle 11    Spacer/Aero Chamber Mouthpiece MISC 1 each by Does not apply route once as needed (SOB) 1 each 0    Multiple Vitamins-Minerals (THERAPEUTIC MULTIVITAMIN-MINERALS) tablet Take 1 tablet by mouth daily 90 tablet 3    UNABLE TO FIND Shingles vaccine 1 Units 0    calcium carbonate-vitamin D (CALCIUM 600 + D) 600-400 MG-UNIT TABS per tab Twice daily by mouth 60 tablet 11    aspirin 325 MG tablet Take 325 mg by mouth every morning.  Over The Counter , Last Dose Taken 11-9-12 Due To Scheduled Procedure      OXYGEN by Nasal route nightly. Oxygen 2 liters per minute           Allergies  Allergies   Allergen Reactions    Pcn [Penicillins] Other (See Comments)     \"Been so long ago, that I forgot. \"    Thiazide-Type Diuretics      low sodium       REVIEW OF SYSTEMS   Within above limitations. 14 point review of systems reviewed. Pertinent positive or negative as per HPI or otherwise negative per 14 point systems review. PHYSICAL EXAM     Wt Readings from Last 3 Encounters:   09/30/20 95 lb (43.1 kg)   11/05/19 93 lb 9.6 oz (42.5 kg)   06/03/19 90 lb 9.6 oz (41.1 kg)       Blood pressure (!) 139/99, pulse 83, temperature 97.9 °F (36.6 °C), temperature source Oral, resp. rate 13, height 5' 4\" (1.626 m), weight 95 lb (43.1 kg), SpO2 97 %, not currently breastfeeding. GEN  -Awake, alert, NAD.   EYES   -PERRL. HENT  -MM are moist.   RESP  -LS CTA equal bilat, wheezes, rales or rhonchi. Symmetric chest movement. No respiratory distress noted. C/V  -S1/S2 auscultated, tachycardia without appreciable M/R/G. No JVD or carotid bruits. Peripheral pulses equal bilaterally and palpable. No peripheral edema. GI  -Abdomen is soft, non-distended, no significant tenderness. + BS in all quadrants. Rectal exam deferred.   -No CVA tenderness. Montelongo catheter is not present. MS  -B/L extremities - No gross joint deformities. No swelling, intact sensation symmetrical.   SKIN  -Normal coloration, warm, dry. NEURO  -CN 2-12 appear grossly intact, normal speech, no lateralizing weakness. PSYC  -Awake, alert, oriented x 4. Appropriate affect. LABS AND IMAGING     Results for Clover Kuhn (MRN [id number]) as of 9/30/2020 05:24   Ref.  Range 9/30/2020 03:38   Sodium Latest Ref Range: 135 - 145 MMOL/L 135   Potassium Latest Ref Range: 3.5 - 5.1 MMOL/L 4.7   Chloride Latest Ref Range: 99 - 110 mMol/L 96 (L)   CO2 Latest Ref Range: 21 - 32 MMOL/L 28   BUN Latest Ref Range: 6 - 23 MG/DL 12 Troponin T Latest Ref Range: <0.01 NG/ML <0.010     Recent Imaging    XR CHEST PORTABLE [[id number]]  Collected: 09/30/20 0346        Order Status: Completed  Updated: 09/30/20 0354       Narrative:         EXAMINATION:   ONE XRAY VIEW OF THE CHEST     9/30/2020 3:13 am     COMPARISON:   03/26/2015     HISTORY:   ORDERING SYSTEM PROVIDED HISTORY: sob   TECHNOLOGIST PROVIDED HISTORY:   Reason for exam:->sob   Reason for Exam: SOB   Acuity: Unknown   Type of Exam: Initial     FINDINGS:   No focal consolidation, pleural effusion or pneumothorax.  The cardiac   silhouette and mediastinal contours are stable.  No acute bony abnormality. Unchanged right axillary surgical clips.       Impression:         No acute process.           Relevant labs and imaging reviewed    ASSESSMENT AND PLAN     #. COPD exacerbation:  -pt had wheezing on examination. -pt received p.o. prednisone, albuterol, DuoNeb. -Continue IV methylprednisolone, DuoNeb. -Patient is at baseline oxygen requirement. #.  Chest pain:  -Patient reports she has this pain for a long time-could not give me a duration. -EKG revealed T wave inversions in inferior leads, troponin negative  -Repeat troponin, EKG. -Cardiology consult  -2D echo ordered    #. COPD, on home oxygen  -Patient is on Atrovent every 6 hours, albuterol PRN, Flovent. #. peripheral arterial disease  -status post atherectomy  left popliteal TP trunk  and peroneal artery with PTA, aortobifemoral stent graft-2016, left femoral/popliteal-2016  -Continue aspirin, Plavix, statin    #. history of breast cancer status post right breast lumpectomy, and radiation therapy    #. AAA repair    #. Hypertension-not on any antihypertensives    #. Hyperlipidemia-continue atorvastatin    #. chronic tobacco dependence-nicotine patch    #. chronic alcohol use -[facility] protocol ordered  -Thiamine, multivitamins, fall precautions, seizure precautions.     DVT Prophylaxis: Lovenox  GI Prophylaxis: Protonix  Code Status:FULL. Discussed with patient.       Case d/w ED physician    Sharyle Dalton, MD  Hospitalist, Internal Medicine  9/30/2020 at 4:48 AM

## 2020-09-30 NOTE — ED NOTES
Called [phone] [facility] charge X3 to give report. No answer at this time.      Berkley Cowan, RN  09/30/20 3158

## 2020-09-30 NOTE — ED NOTES
Gave report to [employer] on [address] [facility], [address] [place] [address]  09/30/20 5129

## 2020-09-30 NOTE — PROGRESS NOTES
Comprehensive Nutrition Assessment    Type and Reason for Visit:  Initial, Positive Nutrition Screen(weight loss, poor intake)    Nutrition Recommendations/Plan:   Resume diet when appropriate, general diet    As diet resume offer high calorie oral nutrition supplement    Nutrition Assessment:  Admit with shortness of breath, hx COPD. NPO today for testing. Patient off unit on attempted visits today. High nutrition risk at this time with predicted suboptimal intake in illness. Low BMI noted. As diet resumes offer high calorie supplement. Malnutrition Assessment:  Malnutrition Status:  Insufficient data(patient off unit on visit for testing)    Context:  Chronic Illness       Estimated Daily Nutrient Needs:  Energy (kcal):  3962-9001 (35-40 sushma/kg minimum) additional 350-500 as needed for gain; Weight Used for Energy Requirements:  Current     Protein (g):  63 (1.5 g/kg); Weight Used for Protein Requirements:  Current        Fluid (ml/day):  8653-1555 (1ml/sushma);  Weight Used for Fluid Requirements:  Current      Nutrition Related Findings:  our of room/off unit for testing, hx dementia, alcohol abuse (receiving multivitamin, thiamine, folvite), hard of hearing      Wounds:  None       Current Nutrition Therapies:    Diet NPO Effective Now    Anthropometric Measures:  · Height: 5' 7\" (170.2 cm)  · Current Body Weight: 91 lb 11.4 oz (41.6 kg)   · Admission Body Weight: 91 lb 11.4 oz (41.6 kg)    · Usual Body Weight: 93 lb 11.1 oz (42.5 kg)(per hx wt last year)     · Ideal Body Weight: 135 lbs; % Ideal Body Weight 67.9 %   · BMI: 14.4  · Adjusted Body Weight:  ; No Adjustment   · BMI Categories: Underweight (BMI less than 22) age over 72       Nutrition Diagnosis:   · Predicted inadequate energy intake related to catabolic illness, increase demand for energy/nutrients as evidenced by BMI      Nutrition Interventions:   Food and/or Nutrient Delivery:  Start Oral Diet, Start Oral Nutrition Supplement  Nutrition

## 2020-09-30 NOTE — PLAN OF CARE
Problem: Discharge Planning:  Goal: Discharged to appropriate level of care  Description: Discharged to appropriate level of care  Outcome: Ongoing     Problem:  Activity Intolerance:  Goal: Ability to tolerate increased activity will improve  Description: Ability to tolerate increased activity will improve  Outcome: Ongoing     Problem: Airway Clearance - Ineffective:  Goal: Ability to maintain a clear airway will improve  Description: Ability to maintain a clear airway will improve  Outcome: Ongoing     Problem: Breathing Pattern - Ineffective:  Goal: Ability to achieve and maintain a regular respiratory rate will improve  Description: Ability to achieve and maintain a regular respiratory rate will improve  Outcome: Ongoing     Problem: Gas Exchange - Impaired:  Goal: Levels of oxygenation will improve  Description: Levels of oxygenation will improve  Outcome: Ongoing     Problem: Fluid Volume - Deficit:  Goal: Absence of fluid volume deficit signs and symptoms  Description: Absence of fluid volume deficit signs and symptoms  Outcome: Ongoing     Problem: Nutrition Deficit:  Goal: Ability to achieve adequate nutritional intake will improve  Description: Ability to achieve adequate nutritional intake will improve  Outcome: Ongoing     Problem: Sleep Pattern Disturbance:  Goal: Appears well-rested  Description: Appears well-rested  Outcome: Ongoing     Problem: Violence - Risk of, Self/Other-Directed:  Goal: Knowledge of developmental care interventions  Description: Absence of violence  Outcome: Ongoing     Problem: Falls - Risk of:  Goal: Will remain free from falls  Description: Will remain free from falls  Outcome: Ongoing  Goal: Absence of physical injury  Description: Absence of physical injury  Outcome: Ongoing

## 2020-09-30 NOTE — ED PROVIDER NOTES
eMERGENCY dEPARTMENT eNCOUnter        Diandramichael Fidel    Chief Complaint   Patient presents with    Shortness of Breath       HPI    Carroll Bolton is a 78 y.o. female who presents with shortness of breath. Patient with dementia and very hard of hearing. Granddaughter is at bedside and provides history. Patient with COPD, continues to smoke 1/2ppd. She is supposed to wear 2L oxygen at all times but wears it for about an hour/day. Granddaughter states she noticed patient was wheezing and having increased work of breathing just tonight. Denies any complaints of chest pain. She placed her oxygen on her which resolved symptoms. She has a chronic smoker's cough but may be coughing slightly more than usual.  Denies fever. She had some loose stools and vomiting yesterday. REVIEW OF SYSTEMS    Constitutional:  Denies fever. HENT:  Denies headache, nasal congestion, sore throat. Neck:  Denies neck pain. Respiratory:  + shortness of breath, cough. Cardiovascular:  Denies chest pain. GI:  Denies abdominal pain, nausea, vomiting, diarrhea. :  Denies urinary symptoms. Musculoskeletal:  Denies extremity swelling. Integument:  Denies skin changes. Neurologic:  Denies any LOC. All other systems reviewed and are negative.     PAST MEDICAL & SURGICAL HISTORY    Past Medical History:   Diagnosis Date    AAA (abdominal aortic aneurysm) (HCC)     Alcohol abuse     4 beers daily    Alcohol-induced persisting dementia (HCC)     Arthritis     Asthma     COPD (chronic obstructive pulmonary disease) (HCC)     History of breast cancer Dx 12-06    Lumpectomy Right Breast For Cancer, Had Radiation Treatments    Hx of blood clots     HX OTHER MEDICAL     Primary Care Physician Is Dr. Ronel Garner    Hyperlipidemia     high HDL    Hypertension     Iliac artery aneurysm, bilateral ([address]     On home oxygen therapy     At Night 2 Liters Per Nasal Cannula    Osteopenia     Peripheral vascular disease [facility]) Dx in 5-2008    Presbyopia 12/10/2019     Past Surgical History:   Procedure Laterality Date    ABDOMEN SURGERY  1970's    \"Surgery For Adhesions\"    ABDOMINAL AORTIC ANEURYSM REPAIR, ENDOVASCULAR  11/13/2012    BREAST BIOPSY  5/2013    benign    BREAST LUMPECTOMY  12-06    Lumpectomy Right Breast For Cancer, Had Radiation  Treatments    COLONOSCOPY  2011, 8-12    Mukerjee    DENTAL SURGERY      Teeth Extracted In Past    FEMORAL BYPASS  4-09    \"Both Legs\"    FRACTURE SURGERY  Early 1980's    Broken Left Arm    HYSTERECTOMY, TOTAL ABDOMINAL  1980's       CURRENT MEDICATIONS    Current Outpatient Rx   Medication Sig Dispense Refill    ipratropium (ATROVENT HFA) 17 MCG/ACT inhaler Inhale 1 puff into the lungs 4 times daily 3 Inhaler 3    albuterol sulfate HFA (PROAIR HFA) 108 (90 Base) MCG/ACT inhaler Inhale 2 puffs into the lungs every 4 hours as needed for Wheezing or Shortness of Breath 3 Inhaler 3    folic acid (FOLVITE) 1 MG tablet Take 1 tablet by mouth daily 90 tablet 3    fluticasone propionate (FLOVENT DISKUS) 250 MCG/BLIST AEPB inhaler 1 puff twice per day 1 each 5    clopidogrel (PLAVIX) 75 MG tablet Take 1 tablet by mouth daily 90 tablet 1    atorvastatin (LIPITOR) 80 MG tablet Take 1 tablet by mouth daily 90 tablet 1    naltrexone (DEPADE) 50 MG tablet Take 1 tablet by mouth daily 30 tablet 2    albuterol (PROVENTIL) (2.5 MG/3ML) 0.083% nebulizer solution Take 3 mLs by nebulization every 6 hours as needed for Wheezing or Shortness of Breath 360 mL 11    Nebulizers (MINI PLUS NEBULIZER) MISC Use 4 times per day as needed 1 each 0    vitamin B-1 (THIAMINE) 100 MG tablet Take 1 tablet by mouth daily 90 tablet 3    UNABLE TO FIND Shower Chair 1 each 0    Nutritional Supplements (ENSURE ACTIVE) LIQD 1 can 3 times per day (Patient not taking: Reported on 11/5/2019) 90 Bottle 11    Spacer/Aero Chamber Mouthpiece MISC 1 each by Does not apply route once as needed (SOB) 1 each 0    Multiple Vitamins-Minerals (THERAPEUTIC MULTIVITAMIN-MINERALS) tablet Take 1 tablet by mouth daily 90 tablet 3    UNABLE TO FIND Shingles vaccine 1 Units 0    calcium carbonate-vitamin D (CALCIUM 600 + D) 600-400 MG-UNIT TABS per tab Twice daily by mouth 60 tablet 11    aspirin 325 MG tablet Take 325 mg by mouth every morning. Over The Counter , Last Dose Taken 11-9-12 Due To Scheduled Procedure      OXYGEN by Nasal route nightly. Oxygen 2 liters per minute         ALLERGIES    Allergies   Allergen Reactions    Pcn [Penicillins] Other (See Comments)     \"Been so long ago, that I forgot. \"    Thiazide-Type Diuretics      low sodium       SOCIAL & FAMILY HISTORY    Social History     Socioeconomic History    Marital status:      Spouse name: Not on file    Number of children: Not on file    Years of education: Not on file    Highest education level: Not on file   Occupational History    Not on file   Social Needs    Financial resource strain: Not on file    Food insecurity     Worry: Not on file     Inability: Not on file    Transportation needs     Medical: Not on file     Non-medical: Not on file   Tobacco Use    Smoking status: Current Every Day Smoker     Packs/day: 1.00     Years: 61.00     Pack years: 61.00     Types: Cigarettes    Smokeless tobacco: Never Used   Substance and Sexual Activity    Alcohol use:  Yes     Alcohol/week: 0.0 standard drinks     Comment: \"Beer Every Day\"    Drug use: No    Sexual activity: Never   Lifestyle    Physical activity     Days per week: Not on file     Minutes per session: Not on file    Stress: Not on file   Relationships    Social connections     Talks on phone: Not on file     Gets together: Not on file     Attends Alevism service: Not on file     Active member of club or organization: Not on file     Attends meetings of clubs or organizations: Not on file     Relationship status: Not on file    Intimate partner violence     Fear of current or ex partner: Not on file     Emotionally abused: Not on file     Physically abused: Not on file     Forced sexual activity: Not on file   Other Topics Concern    Not on file   Social History Narrative    Not on file     Family History   Problem Relation Age of Onset    Colon Cancer Father     Coronary Art Dis Sister     Heart Disease Sister     High Blood Pressure Sister     High Cholesterol Sister     Coronary Art Dis Brother     Heart Attack Brother          age 76    Heart Disease Brother     Early Death Brother     Other Sister         \"Both Legs Amputated\"    Kidney Disease Sister         \"Kidney Problems\"    Early Death Brother         Suicide- hung himself    Early Death Brother         Murdered in his 42's    Early Death Son 1         in a fire    Early Death Mother 61    Vision Loss Sister         \"Eye Problems\"    Other Daughter         \"Lung Problems\"       PHYSICAL EXAM    VITAL SIGNS: BP (!) 139/99   Pulse 107   Temp 97.9 °F (36.6 °C) (Oral)   Resp 19   Ht 5' 4\" (1.626 m)   Wt 95 lb (43.1 kg)   LMP  (LMP Unknown)   SpO2 97%   BMI 16.31 kg/m²    Constitutional:  Well developed, well nourished, no acute distress   HENT:  NC/AT. Ears, nose, mouth normal.  Neck:  Supple. Respiratory:  Lungs with faint wheezing, mildly labored respirations. Cardiovascular:  Mildly tachy. GI:  Soft, non-tender, bowel sounds active. Musculoskeletal:  No edema, no tenderness. DP intact and symmetric. Integument:  Warm and dry. Neurologic:  Alert & oriented. Very hard of hearing. EKG    Please see Dr. Vadim Schultz note for interpretation.     RADIOLOGY/PROCEDURES    Labs Reviewed   CBC WITH AUTO DIFFERENTIAL - Abnormal; Notable for the following components:       Result Value    MCH 32.0 (*)     RDW 15.2 (*)     Lymphocytes % 19.2 (*)     Monocytes % 13.7 (*)     Immature Neutrophil % 0.8 (*)     All other components within normal limits   COMPREHENSIVE METABOLIC PANEL W/ REFLEX TO MG FOR LOW K - Abnormal; Notable for the following components:    Chloride 96 (*)     Glucose 135 (*)     ALT 8 (*)     All other components within normal limits   BRAIN NATRIURETIC PEPTIDE - Abnormal; Notable for the following components:    Pro-BNP 2,572 (*)     All other components within normal limits   TROPONIN     Xr Chest Portable    Result Date: 9/30/2020  EXAMINATION: ONE XRAY VIEW OF THE CHEST 9/30/2020 3:13 am COMPARISON: 03/26/2015 HISTORY: ORDERING SYSTEM PROVIDED HISTORY: sob TECHNOLOGIST PROVIDED HISTORY: Reason for exam:->sob Reason for Exam: SOB Acuity: Unknown Type of Exam: Initial FINDINGS: No focal consolidation, pleural effusion or pneumothorax. The cardiac silhouette and mediastinal contours are stable. No acute bony abnormality. Unchanged right axillary surgical clips. No acute process. ED COURSE & MEDICAL DECISION MAKING    Pertinent Labs & Imaging studies reviewed and interpreted. (See chart for details)  -  Patient seen and evaluated in the emergency department. -  Triage and nursing notes reviewed and incorporated. -  Old chart records reviewed and incorporated. -  Supervising physician was Dr. Evelyn Prince. Patient was seen independently. -  Differential diagnosis includes: ACS, COPD exacerbation, CHF, MI, PE, pneumonia, pneumothorax, and others  -  Work-up included:  See above  -  ED treatment included:  Albuterol MDIs, Prednisone, Duonebs  -  Results were discussed with patient and granddaughter. Initial work-up is unremarkable, CXR is clear. She does have some new T wave inversions on EKG. On reassessment, patient is complaining of chest pain. We will admit for further ACS rule out and exacerbation of her COPD. I spoke with Dr. Reuben Prieto, hospitalist, who accepted patient to her service.       In light of current events, I did utilize appropriate PPE (including surgical face mask, safety glasses, and gloves, as recommended by the health facility/national standard best practice, during my bedside interactions with the patient. FINAL IMPRESSION    1. Chest pain, unspecified type    2. [facility] exacerbation ([facility]    3.  Acute electrocardiogram changes                Zahra Hunter PA-C  09/30/20 2077

## 2020-09-30 NOTE — ED NOTES
Spoke with [employer] on [id number] [facility]. Stated she was not able to take report at this time. Will call back shortly.      Ruth Waldron RN  09/30/20 [id number]

## 2020-09-30 NOTE — ED NOTES
Pt grand daughter at bedside. States that she heard pt wheezing and having difficulty breathing, is suppose to be on 2L o2 at all times, granddaughter states that she is not compliant with oxygen. Pt has had a cough, granddaughter states it is a bit more than her regular smokers cough. Yesterday pt had a 1 episode of loose stool and emesis. No fevers or chills.         Juan Manuel Avendano RN  09/30/20 [id number]

## 2020-10-01 ENCOUNTER — TELEPHONE (OUTPATIENT)
Dept: FAMILY MEDICINE CLINIC | Age: 80
End: 2020-10-01

## 2020-10-01 ENCOUNTER — APPOINTMENT (OUTPATIENT)
Dept: GENERAL RADIOLOGY | Age: 80
DRG: 191 | End: 2020-10-01
Payer: COMMERCIAL

## 2020-10-01 LAB
ANION GAP SERPL CALCULATED.3IONS-SCNC: 13 MMOL/L (ref 4–16)
BUN BLDV-MCNC: 12 MG/DL (ref 6–23)
CALCIUM SERPL-MCNC: 9.1 MG/DL (ref 8.3–10.6)
CHLORIDE BLD-SCNC: 94 MMOL/L (ref 99–110)
CO2: 24 MMOL/L (ref 21–32)
CREAT SERPL-MCNC: 0.6 MG/DL (ref 0.6–1.1)
EKG ATRIAL RATE: 73 BPM
EKG DIAGNOSIS: NORMAL
EKG P AXIS: 81 DEGREES
EKG P-R INTERVAL: 162 MS
EKG Q-T INTERVAL: 406 MS
EKG QRS DURATION: 86 MS
EKG QTC CALCULATION (BAZETT): 447 MS
EKG R AXIS: 45 DEGREES
EKG T AXIS: -30 DEGREES
EKG VENTRICULAR RATE: 73 BPM
GFR AFRICAN AMERICAN: >60 ML/MIN/1.73M2
GFR NON-AFRICAN AMERICAN: >60 ML/MIN/1.73M2
GLUCOSE BLD-MCNC: 211 MG/DL (ref 70–99)
MAGNESIUM: 2.3 MG/DL (ref 1.8–2.4)
POTASSIUM SERPL-SCNC: 4.1 MMOL/L (ref 3.5–5.1)
SODIUM BLD-SCNC: 131 MMOL/L (ref 135–145)

## 2020-10-01 PROCEDURE — 94761 N-INVAS EAR/PLS OXIMETRY MLT: CPT

## 2020-10-01 PROCEDURE — 6360000002 HC RX W HCPCS: Performed by: FAMILY MEDICINE

## 2020-10-01 PROCEDURE — 83735 ASSAY OF MAGNESIUM: CPT

## 2020-10-01 PROCEDURE — 99232 SBSQ HOSP IP/OBS MODERATE 35: CPT | Performed by: INTERNAL MEDICINE

## 2020-10-01 PROCEDURE — 2700000000 HC OXYGEN THERAPY PER DAY

## 2020-10-01 PROCEDURE — 6370000000 HC RX 637 (ALT 250 FOR IP): Performed by: INTERNAL MEDICINE

## 2020-10-01 PROCEDURE — 94640 AIRWAY INHALATION TREATMENT: CPT

## 2020-10-01 PROCEDURE — 2580000003 HC RX 258: Performed by: FAMILY MEDICINE

## 2020-10-01 PROCEDURE — 2580000003 HC RX 258: Performed by: INTERNAL MEDICINE

## 2020-10-01 PROCEDURE — 36415 COLL VENOUS BLD VENIPUNCTURE: CPT

## 2020-10-01 PROCEDURE — 1200000000 HC SEMI PRIVATE

## 2020-10-01 PROCEDURE — 93005 ELECTROCARDIOGRAM TRACING: CPT | Performed by: INTERNAL MEDICINE

## 2020-10-01 PROCEDURE — 93010 ELECTROCARDIOGRAM REPORT: CPT | Performed by: INTERNAL MEDICINE

## 2020-10-01 PROCEDURE — 6360000002 HC RX W HCPCS: Performed by: INTERNAL MEDICINE

## 2020-10-01 PROCEDURE — 6360000002 HC RX W HCPCS: Performed by: PHYSICIAN ASSISTANT

## 2020-10-01 PROCEDURE — APPNB45 APP NON BILLABLE 31-45 MINUTES: Performed by: NURSE PRACTITIONER

## 2020-10-01 PROCEDURE — 80048 BASIC METABOLIC PNL TOTAL CA: CPT

## 2020-10-01 PROCEDURE — 99222 1ST HOSP IP/OBS MODERATE 55: CPT | Performed by: INTERNAL MEDICINE

## 2020-10-01 RX ORDER — ACETYLCYSTEINE 200 MG/ML
600 SOLUTION ORAL; RESPIRATORY (INHALATION) 2 TIMES DAILY
Status: DISPENSED | OUTPATIENT
Start: 2020-10-01 | End: 2020-10-04

## 2020-10-01 RX ORDER — METHYLPREDNISOLONE SODIUM SUCCINATE 40 MG/ML
40 INJECTION, POWDER, LYOPHILIZED, FOR SOLUTION INTRAMUSCULAR; INTRAVENOUS EVERY 12 HOURS
Status: DISCONTINUED | OUTPATIENT
Start: 2020-10-01 | End: 2020-10-02

## 2020-10-01 RX ADMIN — METHYLPREDNISOLONE SODIUM SUCCINATE 40 MG: 40 INJECTION, POWDER, FOR SOLUTION INTRAMUSCULAR; INTRAVENOUS at 09:02

## 2020-10-01 RX ADMIN — ACETAMINOPHEN 650 MG: 325 TABLET ORAL at 15:55

## 2020-10-01 RX ADMIN — IPRATROPIUM BROMIDE AND ALBUTEROL SULFATE 1 AMPULE: .5; 3 SOLUTION RESPIRATORY (INHALATION) at 15:46

## 2020-10-01 RX ADMIN — IPRATROPIUM BROMIDE AND ALBUTEROL SULFATE 1 AMPULE: .5; 3 SOLUTION RESPIRATORY (INHALATION) at 08:37

## 2020-10-01 RX ADMIN — METHYLPREDNISOLONE SODIUM SUCCINATE 40 MG: 40 INJECTION, POWDER, FOR SOLUTION INTRAMUSCULAR; INTRAVENOUS at 20:37

## 2020-10-01 RX ADMIN — PANTOPRAZOLE SODIUM 40 MG: 40 TABLET, DELAYED RELEASE ORAL at 05:31

## 2020-10-01 RX ADMIN — Medication 4 PUFF: at 08:37

## 2020-10-01 RX ADMIN — METHYLPREDNISOLONE SODIUM SUCCINATE 40 MG: 40 INJECTION, POWDER, FOR SOLUTION INTRAMUSCULAR; INTRAVENOUS at 03:33

## 2020-10-01 RX ADMIN — MAGNESIUM SULFATE HEPTAHYDRATE 1 G: 1 INJECTION, SOLUTION INTRAVENOUS at 00:49

## 2020-10-01 RX ADMIN — ENOXAPARIN SODIUM 40 MG: 40 INJECTION SUBCUTANEOUS at 08:43

## 2020-10-01 RX ADMIN — IPRATROPIUM BROMIDE AND ALBUTEROL SULFATE 1 AMPULE: .5; 3 SOLUTION RESPIRATORY (INHALATION) at 12:00

## 2020-10-01 RX ADMIN — AZITHROMYCIN MONOHYDRATE 500 MG: 500 INJECTION, POWDER, LYOPHILIZED, FOR SOLUTION INTRAVENOUS at 10:48

## 2020-10-01 RX ADMIN — CLOPIDOGREL BISULFATE 75 MG: 75 TABLET ORAL at 08:42

## 2020-10-01 RX ADMIN — ATORVASTATIN CALCIUM 80 MG: 80 TABLET, FILM COATED ORAL at 08:41

## 2020-10-01 RX ADMIN — SODIUM CHLORIDE, PRESERVATIVE FREE 10 ML: 5 INJECTION INTRAVENOUS at 20:38

## 2020-10-01 RX ADMIN — FOLIC ACID 1 MG: 1 TABLET ORAL at 08:43

## 2020-10-01 RX ADMIN — GUAIFENESIN 600 MG: 600 TABLET, EXTENDED RELEASE ORAL at 20:37

## 2020-10-01 RX ADMIN — SODIUM CHLORIDE, PRESERVATIVE FREE 10 ML: 5 INJECTION INTRAVENOUS at 08:46

## 2020-10-01 RX ADMIN — THERA TABS 1 TABLET: TAB at 08:44

## 2020-10-01 RX ADMIN — GUAIFENESIN 600 MG: 600 TABLET, EXTENDED RELEASE ORAL at 08:44

## 2020-10-01 RX ADMIN — MAGNESIUM SULFATE HEPTAHYDRATE 1 G: 1 INJECTION, SOLUTION INTRAVENOUS at 01:43

## 2020-10-01 RX ADMIN — ASPIRIN 81 MG CHEWABLE TABLET 81 MG: 81 TABLET CHEWABLE at 08:41

## 2020-10-01 RX ADMIN — Medication 100 MG: at 08:45

## 2020-10-01 ASSESSMENT — PAIN SCALES - GENERAL
PAINLEVEL_OUTOF10: 0

## 2020-10-01 ASSESSMENT — ENCOUNTER SYMPTOMS: SHORTNESS OF BREATH: 1

## 2020-10-01 NOTE — PROGRESS NOTES
Hospitalist Progress Note      Name:  James Quispe /Age/Sex: 1940  (78 y.o. female)   MRN & CSN:  [id number] & [id number] Admission Date/Time: 2020  2:55 AM   Location:  [address] [place] [address]- PCP: Tracy Coffman MD       James Quispe is a 78 y.o.  female  who presents with Shortness of Breath      Assessment and Plan:   COPD exacerbation on chronic resp failure  -duonebs  -IV methylprednisolone taper now  - guaifenacin  - NC O2  - CTA chest: neg PE, mucous plugging noted along with irregular nodular infiltrates   - consult pulmonology   - ambulate and increase activity  - IS     Chest pain r/o acs  - stress test: WNL  - EKG revealed T wave inversions in inferior leads, troponin negative  - plavix + statin  - Repeat troponin, EKG.  - Cardiology consult  - 2D echo: ef 70%, mod TR, mod MR     PAD  S/p AAA repair  HTN  HLD  chronic tobacco dependence  chronic alcohol use   Hx of breast ca            Diet DIET CARDIAC;   Code Status Full Code     Medications:   Medications:    azithromycin  500 mg Intravenous Q24H    sodium chloride flush  10 mL Intravenous 2 times per day    aspirin  81 mg Oral Daily    enoxaparin  40 mg Subcutaneous Daily    methylPREDNISolone  40 mg Intravenous Q6H    ipratropium-albuterol  1 ampule Inhalation Q4H WA    guaiFENesin  600 mg Oral BID    thiamine  100 mg Oral Daily    multivitamin  1 tablet Oral Daily    nicotine  1 patch Transdermal Daily    atorvastatin  80 mg Oral Daily    clopidogrel  75 mg Oral Daily    fluticasone  4 puff Inhalation BID    folic acid  1 mg Oral Daily    pantoprazole  40 mg Oral QAM AC      Infusions:   PRN Meds: sodium chloride flush, 10 mL, PRN  acetaminophen, 650 mg, Q6H PRN    Or  acetaminophen, 650 mg, Q6H PRN  polyethylene glycol, 17 g, Daily PRN  promethazine, 12.5 mg, Q6H PRN    Or  ondansetron, 4 mg, Q6H PRN  LORazepam, 1 mg, Q1H PRN    Or  LORazepam, 1 mg, Q1H PRN    Or  LORazepam, 2 mg, Q1H PRN    Or  LORazepam, 2 mg, Q1H PRN Or  LORazepam, 3 mg, Q1H PRN    Or  LORazepam, 3 mg, Q1H PRN    Or  LORazepam, 4 mg, Q1H PRN    Or  LORazepam, 4 mg, Q1H PRN  magnesium sulfate, 1 g, PRN      Subjective:   Doing better, no distress    Objective: Intake/Output Summary (Last 24 hours) at 10/1/2020 0931  Last data filed at 10/1/2020 0846  Gross per 24 hour   Intake 10 ml   Output --   Net 10 ml      Vitals:   Vitals:    10/01/20 0840   BP:    Pulse:    Resp: 16   Temp:    SpO2: 95%     Physical Exam:   Gen:  awake, alert, no apparent distress  Head/Eyes:  Normocephalic atraumatic, EOMI   NECK:   symmetrical, trachea midline  LUNGS: Normal Effort   CARDIOVASCULAR:  Normal rate  ABDOMEN:  non distended  MUSCULOSKELETAL:  ROM WNL  NEUROLOGIC: Alert and Oriented,  Cranial nerves II-XII are grossly intact.    SKIN:  no bruising or bleeding, normal skin color,  no redness      Data:       CBC   Recent Labs     09/30/20  0338   WBC 5.0   HGB 13.8   HCT 40.9         BMP   Recent Labs     09/30/20  0338      K 4.7   CL 96*   CO2 28   BUN 12   CREATININE 0.8         Electronically signed by Dilshad Conroy MD on 10/1/2020 at 9:31 AM

## 2020-10-01 NOTE — PROGRESS NOTES
Parish Strauss BSN RN clinical  for [facility] SURGICAL [facility] RN students 07-19:00 this date.

## 2020-10-01 NOTE — TELEPHONE ENCOUNTER
Jay Jay Lowers from [address] left message asking for verbal orders to start home care for patient

## 2020-10-01 NOTE — CONSULTS
Pulmonary Consult Note      Reason for Consult: mucus plugging, nodular infiltrates, collapse lung on CT  Requesting Physician: Dr. Héctor Prieto  Subjective:   CHIEF COMPLAINT :SOB    Patient Active Problem List    Diagnosis Date Noted    Bilateral hearing loss 08/16/2019     Priority: High    Osteoporosis 12/23/2016     Priority: High     Overview Note:     Completed 5 years of Fosamax      Moderate COPD (chronic obstructive pulmonary disease) ([facility] 01/20/2014     Priority: High    Alcohol abuse 10/11/2012     Priority: High    COPD with acute exacerbation ([facility] 09/30/2020    Chest pain     Functional urinary incontinence 07/17/2020    Unsteady gait 07/17/2020    Cachectic ([facility] [address] [facility] 06/03/2019    History of breast cancer 12/20/2017    Tobacco abuse 12/23/2016    Hyperlipidemia     Peripheral vascular disease ([facility] 12/07/2011        HPI:                The patient is a 78 y.o. female with significant past medical history of COPD, on home oxygen, peripheral arterial disease-status post atherectomy  left popliteal TP trunk  and peroneal artery with PTA, aortobifemoral stent graft-2016, left femoral/popliteal-2016, history of breast cancer status post right breast lumpectomy, and radiation therapy, AAA repair, hypertension, hyperlipidemia, chronic tobacco dependence, chronic alcohol use   presents with complaints of SOB x 2 days. She has a 61 pk yr smoking on 2 L.min of oxygen at home. She has no sick exposure, little cough and little phlegm, no hemoptysis, no loss of weight, fair appetite. She had a CT chest which showed mucus plugging and RML collapse. She also has small multipel nodules in the RLL.      Past Medical History:      Diagnosis Date    AAA (abdominal aortic aneurysm) (HCC)     Alcohol abuse     4 beers daily    Alcohol-induced persisting dementia (HCC)     Arthritis     Asthma     COPD (chronic obstructive pulmonary disease) (HCC)     History of breast cancer Dx 12-06    Lumpectomy Right Breast For Cancer, Had Radiation Treatments    Hx of blood clots     HX OTHER MEDICAL     Primary Care Physician Is Dr. Rach Henson    Hyperlipidemia     high HDL    Hypertension     Iliac artery aneurysm, bilateral ([address]     On home oxygen therapy     At Night 2 Liters Per Nasal Cannula    Osteopenia     Peripheral vascular disease ([facility] [address] [address] Dx in 5-2008    Presbyopia 12/10/2019      Past Surgical History:        Procedure Laterality Date    ABDOMEN SURGERY  1970's    \"Surgery For Adhesions\"    ABDOMINAL AORTIC ANEURYSM REPAIR, ENDOVASCULAR  11/13/2012    BREAST BIOPSY  5/2013    benign    BREAST LUMPECTOMY  12-06    Lumpectomy Right Breast For Cancer, Had Radiation  Treatments    COLONOSCOPY  2011, 8-12    [place]    DENTAL SURGERY      Teeth Extracted In Past    FEMORAL BYPASS  4-09    \"Both Legs\"    FRACTURE SURGERY  Early 1980's    Broken Left Arm    HYSTERECTOMY, TOTAL ABDOMINAL  1980's     Current Medications:     azithromycin  500 mg Intravenous Q24H    methylPREDNISolone  40 mg Intravenous Q12H    sodium chloride flush  10 mL Intravenous 2 times per day    aspirin  81 mg Oral Daily    enoxaparin  40 mg Subcutaneous Daily    ipratropium-albuterol  1 ampule Inhalation Q4H WA    guaiFENesin  600 mg Oral BID    thiamine  100 mg Oral Daily    multivitamin  1 tablet Oral Daily    nicotine  1 patch Transdermal Daily    atorvastatin  80 mg Oral Daily    clopidogrel  75 mg Oral Daily    fluticasone  4 puff Inhalation BID    folic acid  1 mg Oral Daily    pantoprazole  40 mg Oral QAM AC     Allergies:    Social History:    TOBACCO:   reports that she has been smoking cigarettes. She has a 61.00 pack-year smoking history. She has never used smokeless tobacco.  ETOH:   reports current alcohol use.   Patient currently lives independently    Family History:       Problem Relation Age of Onset    Colon Cancer Father     Coronary Art Dis Sister     Heart Disease Sister     High Blood Pressure Sister     High Cholesterol Sister     Coronary Art Dis Brother     Heart Attack Brother          age 76    Heart Disease Brother     Early Death Brother     Other Sister         \"Both Legs Amputated\"    Kidney Disease Sister         \"Kidney Problems\"    Early Death Brother         Suicide- hung himself    Early Death Brother         Murdered in his 42's    Early Death Son 1         in a fire    Early Death Mother 61    Vision Loss Sister         \"Eye Problems\"    Other Daughter         \"Lung Problems\"       REVIEW OF SYSTEMS:    CONSTITUTIONAL:  negative for fevers, chills, diaphoresis, activity change, appetite change, fatigue, night sweats and unexpected weight change. EYES:  negative for blurred vision, eye discharge, visual disturbance and icterus  HEENT:  negative for hearing loss, tinnitus, ear drainage, sinus pressure, nasal congestion, epistaxis and snoring  RESPIRATORY:  See HPI  CARDIOVASCULAR:  negative for chest pain, palpitations, exertional chest pressure/discomfort, edema, syncope  GASTROINTESTINAL:  negative for nausea, vomiting, diarrhea, constipation, blood in stool and abdominal pain  GENITOURINARY:  negative for frequency, dysuria, urinary incontinence, decreased urine volume, and hematuria  HEMATOLOGIC/LYMPHATIC:  negative for easy bruising, bleeding and lymphadenopathy  ALLERGIC/IMMUNOLOGIC:  negative for recurrent infections, angioedema, anaphylaxis and drug reactions  ENDOCRINE:  negative for weight changes and diabetic symptoms including polyuria, polydipsia and polyphagia  MUSCULOSKELETAL:  negative for  pain, joint swelling, decreased range of motion and muscle weakness  NEUROLOGICAL:  negative for headaches, slurred speech, unilateral weakness  PSYCHIATRIC/BEHAVIORAL: negative for hallucinations, behavioral problems, confusion and agitation.      Objective:   PHYSICAL EXAM:      VITALS:  BP (!) 156/89   Pulse 80   Temp 97.6 °F (36.4 °C) (Oral)   Resp 16   Ht 5' 7\" (1.702 m)   Wt 97 lb 3.2 oz (44.1 kg)   LMP  (LMP Unknown)   SpO2 95%   BMI 15.22 kg/m²   24HR INTAKE/OUTPUT:      Intake/Output Summary (Last 24 hours) at 10/1/2020 1209  Last data filed at 10/1/2020 0846  Gross per 24 hour   Intake 10 ml   Output --   Net 10 ml     CURRENT PULSE OXIMETRY:  SpO2: 95 %  24HR PULSE OXIMETRY RANGE:  SpO2  Av.1 %  Min: 93 %  Max: 98 %    CONSTITUTIONAL:  awake, alert, cooperative, no apparent distress, and appears stated age  NECK:  Supple, symmetrical, trachea midline, no adenopathy, thyroid symmetric, not enlarged and no tenderness, skin normal  LUNGS:  Decreased air entry right base  CARDIOVASCULAR:  normal S1 and S2, no edema and no JVD  ABDOMEN:  normal bowel sounds, non-distended and no masses palpated, and no tenderness to palpation. No hepatospleenomegaly  LYMPHADENOPATHY:  no axillary or supraclavicular adenopathy. No cervical adnenopathy  PSYCHIATRIC: Oriented to person place and time. No obvious depression or anxiety. MUSCULOSKELETAL: No obvious misalignment or effusion of the joints. No clubbing, cyanosis of the digits. SKIN:  normal skin color, texture, turgor and no redness, warmth, or swelling.  No palpable nodules    DATA:    Old records have been reviewed  CBC with Differential:    Lab Results   Component Value Date    WBC 5.0 2020    RBC 4.31 2020    HGB 13.8 2020    HCT 40.9 2020     2020    MCV 94.9 2020    MCH 32.0 2020    MCHC 33.7 2020    RDW 15.2 2020    SEGSPCT 64.7 2020    BANDSPCT 3 2015    LYMPHOPCT 19.2 2020    MONOPCT 13.7 2020    EOSPCT 3.0 2011    BASOPCT 0.4 2020    MONOSABS 0.7 2020    LYMPHSABS 1.0 2020    EOSABS 0.1 2020    BASOSABS 0.0 2020    DIFFTYPE AUTOMATED DIFFERENTIAL 2020     BMP:    Lab Results   Component Value Date     2020    K 4.7 2020    CL 96 2020    CO2 28 2020 BUN 12 09/30/2020    CREATININE 0.8 09/30/2020    CALCIUM 9.0 09/30/2020    GFRAA >60 09/30/2020    GFRAA >60 04/30/2012    LABGLOM >60 09/30/2020    GLUCOSE 135 09/30/2020     Hepatic Function Panel:    Lab Results   Component Value Date    ALKPHOS 97 09/30/2020    ALT 8 09/30/2020    AST 16 09/30/2020    PROT 6.5 09/30/2020    PROT 6.8 03/07/2013    BILITOT 0.5 09/30/2020     ABG:    Lab Results   Component Value Date    BEART 0 11/10/2010    PO2ART 102 11/10/2010       Cultures:   Pending      Radiology Review:      No evidence of an acute pulmonary embolus.         Apical predominant emphysema.         Mucous plugging involves the right greater than left bilateral lower lobes    and middle lobe.  There is complete collapse of the middle lobe.         Several irregular nodular infiltrates are present in the right lower lobe. These are likely inflammatory/infectious related to the emphysema and mucous    plugging.  Tumor cannot be excluded and a follow-up to complete clearing is    recommended. Assessment/Plan       Patient Active Problem List    Diagnosis Date Noted    Bilateral hearing loss 08/16/2019     Priority: High    Osteoporosis 12/23/2016     Priority: High     Overview Note:     Completed 5 years of Fosamax      Moderate COPD (chronic obstructive pulmonary disease) ([address] 01/20/2014     Priority: High    Alcohol abuse 10/11/2012     Priority: High    COPD with acute exacerbation ([address] 09/30/2020    Chest pain     Functional urinary incontinence 07/17/2020    Unsteady gait 07/17/2020    Cachectic ([address] 06/03/2019    History of breast cancer 12/20/2017    Tobacco abuse 12/23/2016    Hyperlipidemia     Peripheral vascular disease ([address] 12/07/2011     SOB  COPD  RLL nodules  RML atelectasis likely sec to the mucus plugging  Cachexia  Debility    PLAN  1. Abx  2. CPT  3. ICS  4. Mucomyst  5. CXR in am  6. Repeat CT chest in 6 months at assess the RLL nodules  7. Keep sats > 92%  8. CXR in am  9. C/w present management  10.  Solumedrol      Electronically signed by Emerald Obando MD on 10/1/2020 at 12:09 PM

## 2020-10-01 NOTE — PROGRESS NOTES
Spoke with pt and grand daughter Angel Lawson. Liaison will set up nurse as Angel Lawson stated her grandmother will not participate in therapy. [facility] initiated.

## 2020-10-01 NOTE — PROGRESS NOTES
Cardiology Progress Note     Today's Plan: Sign off    Admit Date:  9/30/2020    Consult reason/ Seen today for: CP     Subjective and  Overnight Events:  Shortness of breath continues. patient very [address] Assessment / Plan / Recommendation:     1. Chest pain: troponin trend negative- normal NM stress. Echo shows pulm. HTN EF 70%. CP due to pneumothorax no ACS. 2. COPD exacerbation, CTA negative for PE but noted to have mucous plug with middle lobe pneumothorax. Pulmonary consulted. 3. Hypomagnesia: 1.6 yesterday which was replaced, will recheck level today. 4. PAD: on plavix   5. Dyslipidemia: continue statins  6. DVT prophylaxis if not contraindicated. 7. Will sign off, please re-consult for any new cardiac complaints or concerns. History of Presenting Illness:    Chief complain on admission : 78 y. o.year old who is admitted for  Chief Complaint   Patient presents with    Shortness of Breath        Past medical history:    has a past medical history of AAA (abdominal aortic aneurysm) ([address] Alcohol abuse, Alcohol-induced persisting dementia ([address] Arthritis, Asthma, COPD (chronic obstructive pulmonary disease) ([address] History of breast cancer, Hx of blood clots, HX OTHER MEDICAL, Hyperlipidemia, Hypertension, Iliac artery aneurysm, bilateral ([address] On home oxygen therapy, Osteopenia, Peripheral vascular disease ([address] and Presbyopia. Past surgical history:   has a past surgical history that includes Breast lumpectomy (12-06); Dental surgery; Hysterectomy, total abdominal (1980's); femoral bypass (4-09); Abdomen surgery (1970's); Colonoscopy (2011, 8-12); fracture surgery (Early 1980's); AAA repair, endovascular (11/13/2012); and Breast biopsy (5/2013). Social History:   reports that she has been smoking cigarettes. She has a 61.00 pack-year smoking history.  She has never used smokeless tobacco. She reports current alcohol use. She reports that she does not use drugs. Family history:  family history includes Colon Cancer in her father; Coronary Art Dis in her brother and sister; Early Death in her brother, brother, and brother; Early Death (age of onset: 1) in her son; Early Death (age of onset: 61) in her mother; Heart Attack in her brother; Heart Disease in her brother and sister; High Blood Pressure in her sister; High Cholesterol in her sister; Kidney Disease in her sister; Other in her daughter and sister; Vision Loss in her sister. Allergies   Allergen Reactions    Pcn [Penicillins] Other (See Comments)     \"Been so long ago, that I forgot. \"    Thiazide-Type Diuretics      low sodium       Review of Systems:  Review of Systems   Respiratory: Positive for shortness of breath. Cardiovascular: Positive for chest pain. All other systems reviewed and are negative. BP (!) 156/89   Pulse 80   Temp 97.6 °F (36.4 °C) (Oral)   Resp 16   Ht 5' 7\" (1.702 m)   Wt 97 lb 3.2 oz (44.1 kg)   LMP  (LMP Unknown)   SpO2 95%   BMI 15.22 kg/m²       Intake/Output Summary (Last 24 hours) at 10/1/2020 1006  Last data filed at 10/1/2020 0846  Gross per 24 hour   Intake 10 ml   Output --   Net 10 ml       Physical Exam:  Constitutional:  Well developed, malnurished, No acute distress  HENT:  Normocephalic, Atraumatic,[place], Bilateral external ears normal,  Nose normal.   Neck- trachea is midline  Eyes:  PERRL, Conjunctiva normal  Respiratory:  Diminished breath sounds, No respiratory distress, No wheezing, No chest tenderness. Cardiovascular:  Normal heart rate, Normal rhythm, no murmurs appreciated, No rubs appreciated, No gallops appreciated, JVP not elevated  Abdomen/GI:  Soft, No tenderness  Musculoskeletal:  Intact distal pulses, no edema, No tenderness, No cyanosis, No clubbing. Integument:  Warm, Dry  Lymphatic:  No lymphadenopathy noted.    Neurologic:  Alert & oriented  Psychiatric:  Affect and Mood :pleasant     Medications:    azithromycin  500 mg Intravenous Q24H    methylPREDNISolone  40 mg Intravenous Q12H    sodium chloride flush  10 mL Intravenous 2 times per day    aspirin  81 mg Oral Daily    enoxaparin  40 mg Subcutaneous Daily    ipratropium-albuterol  1 ampule Inhalation Q4H WA    guaiFENesin  600 mg Oral BID    thiamine  100 mg Oral Daily    multivitamin  1 tablet Oral Daily    nicotine  1 patch Transdermal Daily    atorvastatin  80 mg Oral Daily    clopidogrel  75 mg Oral Daily    fluticasone  4 puff Inhalation BID    folic acid  1 mg Oral Daily    pantoprazole  40 mg Oral QAM AC       sodium chloride flush, acetaminophen **OR** acetaminophen, polyethylene glycol, promethazine **OR** ondansetron, LORazepam **OR** LORazepam **OR** LORazepam **OR** LORazepam **OR** LORazepam **OR** LORazepam **OR** LORazepam **OR** LORazepam, magnesium sulfate    Lab Data:  CBC:   Recent Labs     09/30/20  0338   WBC 5.0   HGB 13.8   HCT 40.9   MCV 94.9        BMP:   Recent Labs     09/30/20  0338      K 4.7   CL 96*   CO2 28   BUN 12   CREATININE 0.8     PT/INR: No results for input(s): PROTIME, INR in the last 72 hours. BNP:    Recent Labs     09/30/20  0338   PROBNP 2,572*     TROPONIN:   Recent Labs     09/30/20  0335 09/30/20  1530   TROPONINT <0.010 <0.010        ECHO : 9/30/2020  Left ventricular systolic function is hyperdynamic. Ejection fraction is visually estimated at 70%. Mild left ventricular hypertrophy. Mildly dilated right heart. Right ventricular lateral wall is hypokinetic with apical sparing; unable to   rule out right heart strain. Sclerotic, but non-stenotic aortic valve. Moderate mitral regurgitation is present. Moderate tricuspid regurgitation is present. RVSP is 65 mmHg. No evidence of any pericardial effusion.     NM Myocardial Spect 9/30/2020  No ischemia     Impression:  Active Problems:    COPD with acute exacerbation ([address]    Chest pain  Resolved Problems:    * No resolved hospital problems. *       All labs, medications and tests reviewed by myself, continue all other medications of all above medical condition listed as is except for changes mentioned above. Thank you   Please call with questions. Electronically signed by Jona Mendez. MANSOOR England CNP on 10/1/2020 at 10:06 AM     I have seen ,spoken to  and examined this patient personally, independently of the nurse practitioner. I have reviewed the hospital care given to date and reviewed all pertinent labs and imaging. The plan was developed mutually at the time of the visit with the patient,  NP  and myself. I have spoken with patient, nursing staff and provided written and verbal instructions . The above note has been reviewed and I agree with the assessment, diagnosis, and treatment plan with changes made by me as follows     CARDIOLOGY ATTENDING ADDENDUM    HPI:  I have reviewed the above HPI  And agree with above   Nella Saeed is a 78 y. o.year old who and presents with had concerns including Shortness of Breath. Chief Complaint   Patient presents with    Shortness of Breath     Interval history:  No cp    Physical Exam:  General:  Awake, alert, NAD  Head:normal  Eye:normal  Neck:  No JVD   Chest:  Clear to auscultation, respiration easy  Cardiovascular:  RRR S1S2  Abdomen:   nontender  Extremities:  no edema  Pulses; palpable  Neuro: grossly normal      MEDICAL DECISION MAKING;    I agree with the above plan, which was planned by myself and discussed with NP.   Normal cardiac nugent        Mylene Paz MD [address] [facility]

## 2020-10-01 NOTE — CARE COORDINATION
Reviewed chart and attempted to speak with pt yesterday about discharge plans but she is very [facility] and asked that I call her Opal Momin daughter she lives with . Called Grand daughter Aydin Figueroa, she confirmed that they live together and Aydin Figueroa is her home care aid through [employer]. Pt has home O2 and no other DME, she is not sure of company O2 comes through and she is presently driving so not able to look up. Aydin Figueroa assists pt with all ADLs and transportation. Pt has a PCP and insurance that covers medications. Discussed home care for skilled nursing and /or PT/OT. Aydin Figueroa states pt will not do therapy , but would like a nurse to come check on pt. Discussed options and she wanted [facility]. PS to Lluvia Canchola at [facility] [place] with referral. No other needs identified a this time.

## 2020-10-01 NOTE — PLAN OF CARE
Problem: Discharge Planning:  Goal: Discharged to appropriate level of care  Description: Discharged to appropriate level of care  10/1/2020 1538 by Helen Lynch RN  Outcome: Ongoing  10/1/2020 0321 by Yusuf Patel RN  Outcome: Ongoing  Goal: Ability to perform activities of daily living will improve  Description: Ability to perform activities of daily living will improve  10/1/2020 1538 by Helen Lynch RN  Outcome: Ongoing  10/1/2020 0321 by Yusuf Patel RN  Outcome: Ongoing  Goal: Participates in care planning  Description: Participates in care planning  10/1/2020 1538 by Helen Lynch RN  Outcome: Ongoing  10/1/2020 0321 by Yusuf Patel RN  Outcome: Ongoing     Problem:  Activity Intolerance:  Goal: Ability to tolerate increased activity will improve  Description: Ability to tolerate increased activity will improve  10/1/2020 1538 by Helen Lynch RN  Outcome: Ongoing  10/1/2020 0321 by Yusuf Patel RN  Outcome: Ongoing     Problem: Airway Clearance - Ineffective:  Goal: Ability to maintain a clear airway will improve  Description: Ability to maintain a clear airway will improve  10/1/2020 1538 by Helen Lynch RN  Outcome: Ongoing  10/1/2020 0321 by Yusuf Patel RN  Outcome: Ongoing     Problem: Breathing Pattern - Ineffective:  Goal: Ability to achieve and maintain a regular respiratory rate will improve  Description: Ability to achieve and maintain a regular respiratory rate will improve  10/1/2020 1538 by Helen Lycnh RN  Outcome: Ongoing  10/1/2020 0321 by Yusuf Patel RN  Outcome: Ongoing     Problem: Gas Exchange - Impaired:  Goal: Levels of oxygenation will improve  Description: Levels of oxygenation will improve  10/1/2020 1538 by Helen Lynch RN  Outcome: Ongoing  10/1/2020 0321 by Yusuf Patel RN  Outcome: Ongoing     Problem: Fluid Volume - Deficit:  Goal: Absence of fluid volume deficit signs and Physical Injury:  Goal: Will remain free from falls  Description: Will remain free from falls  10/1/2020 1538 by Keith Stephens RN  Outcome: Ongoing  10/1/2020 0321 by Faustino Quinones RN  Outcome: Ongoing  Goal: Absence of physical injury  Description: Absence of physical injury  10/1/2020 1538 by Keith Stephens RN  Outcome: Ongoing  10/1/2020 0321 by Faustino Quinones RN  Outcome: Ongoing     Problem: Mood - Altered:  Goal: Mood stable  Description: Mood stable  10/1/2020 1538 by Keith Stephens RN  Outcome: Ongoing  10/1/2020 0321 by Faustino Quinones RN  Outcome: Ongoing  Goal: Absence of abusive behavior  Description: Absence of abusive behavior  10/1/2020 1538 by Keith Stephens RN  Outcome: Ongoing  10/1/2020 0321 by Faustino Quinones RN  Outcome: Ongoing  Goal: Verbalizations of feeling emotionally comfortable while being cared for will increase  Description: Verbalizations of feeling emotionally comfortable while being cared for will increase  10/1/2020 1538 by Keith Stephens RN  Outcome: Ongoing  10/1/2020 0321 by Faustino Quinones RN  Outcome: Ongoing     Problem: Psychomotor Activity - Altered:  Goal: Absence of psychomotor disturbance signs and symptoms  Description: Absence of psychomotor disturbance signs and symptoms  10/1/2020 1538 by Keith Stephens RN  Outcome: Ongoing  10/1/2020 0321 by Faustino Quinones RN  Outcome: Ongoing     Problem: Sensory Perception - Impaired:  Goal: Demonstrations of improved sensory functioning will increase  Description: Demonstrations of improved sensory functioning will increase  10/1/2020 1538 by Keith Stephens RN  Outcome: Ongoing  10/1/2020 0321 by Faustino Quinones RN  Outcome: Ongoing  Goal: Decrease in sensory misperception frequency  Description: Decrease in sensory misperception frequency  10/1/2020 1538 by Keith Stephens RN  Outcome: Ongoing  10/1/2020 0321 by Faustino Quinones RN  Outcome: Ongoing  Goal: Able to refrain from responding to false sensory perceptions  Description: Able to refrain from responding to false sensory perceptions  10/1/2020 1538 by Zahra Hunter RN  Outcome: Ongoing  10/1/2020 0321 by Dirk Caal RN  Outcome: Ongoing  Goal: Demonstrates accurate environmental perceptions  Description: Demonstrates accurate environmental perceptions  10/1/2020 1538 by Zahra Hunter RN  Outcome: Ongoing  10/1/2020 0321 by Dirk Caal RN  Outcome: Ongoing  Goal: Able to distinguish between reality-based and nonreality-based thinking  Description: Able to distinguish between reality-based and nonreality-based thinking  10/1/2020 1538 by Zahra Hunter RN  Outcome: Ongoing  10/1/2020 0321 by Dirk Caal RN  Outcome: Ongoing  Goal: Able to interrupt nonreality-based thinking  Description: Able to interrupt nonreality-based thinking  10/1/2020 1538 by Zahra Hunter RN  Outcome: Ongoing  10/1/2020 0321 by Dirk Caal RN  Outcome: Ongoing     Problem: Skin Integrity:  Goal: Will show no infection signs and symptoms  Description: Will show no infection signs and symptoms  10/1/2020 1538 by Zahra Hunter RN  Outcome: Ongoing  10/1/2020 0321 by Dirk Caal RN  Outcome: Ongoing  Goal: Absence of new skin breakdown  Description: Absence of new skin breakdown  10/1/2020 1538 by Zahra Hunter RN  Outcome: Ongoing  10/1/2020 0321 by Dirk Caal RN  Outcome: Ongoing

## 2020-10-01 NOTE — PLAN OF CARE
Problem: Falls - Risk of:  Goal: Will remain free from falls  Description: Will remain free from falls  10/1/2020 0321 by Marlen Santiago RN  Outcome: Ongoing  Goal: Absence of physical injury  Description: Absence of physical injury  10/1/2020 0321 by Marlen Santiago RN  Outcome: Ongoing

## 2020-10-02 ENCOUNTER — APPOINTMENT (OUTPATIENT)
Dept: GENERAL RADIOLOGY | Age: 80
DRG: 191 | End: 2020-10-02
Payer: COMMERCIAL

## 2020-10-02 PROCEDURE — 2580000003 HC RX 258: Performed by: FAMILY MEDICINE

## 2020-10-02 PROCEDURE — 2700000000 HC OXYGEN THERAPY PER DAY

## 2020-10-02 PROCEDURE — 94761 N-INVAS EAR/PLS OXIMETRY MLT: CPT

## 2020-10-02 PROCEDURE — 1200000000 HC SEMI PRIVATE

## 2020-10-02 PROCEDURE — 6370000000 HC RX 637 (ALT 250 FOR IP): Performed by: INTERNAL MEDICINE

## 2020-10-02 PROCEDURE — 71045 X-RAY EXAM CHEST 1 VIEW: CPT

## 2020-10-02 PROCEDURE — 6360000002 HC RX W HCPCS: Performed by: INTERNAL MEDICINE

## 2020-10-02 PROCEDURE — 2580000003 HC RX 258: Performed by: INTERNAL MEDICINE

## 2020-10-02 PROCEDURE — 94640 AIRWAY INHALATION TREATMENT: CPT

## 2020-10-02 PROCEDURE — 99232 SBSQ HOSP IP/OBS MODERATE 35: CPT | Performed by: INTERNAL MEDICINE

## 2020-10-02 PROCEDURE — 6360000002 HC RX W HCPCS: Performed by: FAMILY MEDICINE

## 2020-10-02 RX ORDER — METHYLPREDNISOLONE SODIUM SUCCINATE 40 MG/ML
40 INJECTION, POWDER, LYOPHILIZED, FOR SOLUTION INTRAMUSCULAR; INTRAVENOUS DAILY
Status: DISCONTINUED | OUTPATIENT
Start: 2020-10-03 | End: 2020-10-03

## 2020-10-02 RX ORDER — BENZONATATE 100 MG/1
100 CAPSULE ORAL 3 TIMES DAILY PRN
Status: DISCONTINUED | OUTPATIENT
Start: 2020-10-02 | End: 2020-10-05 | Stop reason: HOSPADM

## 2020-10-02 RX ADMIN — GUAIFENESIN 600 MG: 600 TABLET, EXTENDED RELEASE ORAL at 09:19

## 2020-10-02 RX ADMIN — IPRATROPIUM BROMIDE AND ALBUTEROL SULFATE 1 AMPULE: .5; 3 SOLUTION RESPIRATORY (INHALATION) at 15:44

## 2020-10-02 RX ADMIN — ACETYLCYSTEINE 600 MG: 200 SOLUTION ORAL; RESPIRATORY (INHALATION) at 07:55

## 2020-10-02 RX ADMIN — METHYLPREDNISOLONE SODIUM SUCCINATE 40 MG: 40 INJECTION, POWDER, FOR SOLUTION INTRAMUSCULAR; INTRAVENOUS at 09:18

## 2020-10-02 RX ADMIN — IPRATROPIUM BROMIDE AND ALBUTEROL SULFATE 1 AMPULE: .5; 3 SOLUTION RESPIRATORY (INHALATION) at 07:55

## 2020-10-02 RX ADMIN — AZITHROMYCIN MONOHYDRATE 500 MG: 500 INJECTION, POWDER, LYOPHILIZED, FOR SOLUTION INTRAVENOUS at 09:50

## 2020-10-02 RX ADMIN — SODIUM CHLORIDE, PRESERVATIVE FREE 10 ML: 5 INJECTION INTRAVENOUS at 09:18

## 2020-10-02 RX ADMIN — ENOXAPARIN SODIUM 40 MG: 40 INJECTION SUBCUTANEOUS at 09:21

## 2020-10-02 RX ADMIN — ATORVASTATIN CALCIUM 80 MG: 80 TABLET, FILM COATED ORAL at 09:19

## 2020-10-02 RX ADMIN — Medication 100 MG: at 09:19

## 2020-10-02 RX ADMIN — ASPIRIN 81 MG CHEWABLE TABLET 81 MG: 81 TABLET CHEWABLE at 09:19

## 2020-10-02 RX ADMIN — PANTOPRAZOLE SODIUM 40 MG: 40 TABLET, DELAYED RELEASE ORAL at 05:20

## 2020-10-02 RX ADMIN — IPRATROPIUM BROMIDE AND ALBUTEROL SULFATE 1 AMPULE: .5; 3 SOLUTION RESPIRATORY (INHALATION) at 11:02

## 2020-10-02 RX ADMIN — THERA TABS 1 TABLET: TAB at 09:19

## 2020-10-02 RX ADMIN — FOLIC ACID 1 MG: 1 TABLET ORAL at 09:19

## 2020-10-02 RX ADMIN — Medication 4 PUFF: at 07:55

## 2020-10-02 RX ADMIN — CLOPIDOGREL BISULFATE 75 MG: 75 TABLET ORAL at 09:19

## 2020-10-02 RX ADMIN — SODIUM CHLORIDE, PRESERVATIVE FREE 10 ML: 5 INJECTION INTRAVENOUS at 21:42

## 2020-10-02 ASSESSMENT — PAIN SCALES - GENERAL
PAINLEVEL_OUTOF10: 0

## 2020-10-02 NOTE — PLAN OF CARE
injury  Description: Absence of physical injury  Outcome: Ongoing     Problem: Confusion - Acute:  Goal: Absence of continued neurological deterioration signs and symptoms  Description: Absence of continued neurological deterioration signs and symptoms  Outcome: Ongoing  Goal: Mental status will be restored to baseline  Description: Mental status will be restored to baseline  Outcome: Ongoing     Problem: Injury - Risk of, Physical Injury:  Goal: Will remain free from falls  Description: Will remain free from falls  Outcome: Ongoing  Goal: Absence of physical injury  Description: Absence of physical injury  Outcome: Ongoing     Problem: Mood - Altered:  Goal: Mood stable  Description: Mood stable  Outcome: Ongoing  Goal: Absence of abusive behavior  Description: Absence of abusive behavior  Outcome: Ongoing  Goal: Verbalizations of feeling emotionally comfortable while being cared for will increase  Description: Verbalizations of feeling emotionally comfortable while being cared for will increase  Outcome: Ongoing     Problem: Psychomotor Activity - Altered:  Goal: Absence of psychomotor disturbance signs and symptoms  Description: Absence of psychomotor disturbance signs and symptoms  Outcome: Ongoing     Problem: Sensory Perception - Impaired:  Goal: Demonstrations of improved sensory functioning will increase  Description: Demonstrations of improved sensory functioning will increase  Outcome: Ongoing  Goal: Decrease in sensory misperception frequency  Description: Decrease in sensory misperception frequency  Outcome: Ongoing  Goal: Able to refrain from responding to false sensory perceptions  Description: Able to refrain from responding to false sensory perceptions  Outcome: Ongoing  Goal: Demonstrates accurate environmental perceptions  Description: Demonstrates accurate environmental perceptions  Outcome: Ongoing  Goal: Able to distinguish between reality-based and nonreality-based thinking  Description: Able to distinguish between reality-based and nonreality-based thinking  Outcome: Ongoing  Goal: Able to interrupt nonreality-based thinking  Description: Able to interrupt nonreality-based thinking  Outcome: Ongoing     Problem: Skin Integrity:  Goal: Will show no infection signs and symptoms  Description: Will show no infection signs and symptoms  Outcome: Ongoing  Goal: Absence of new skin breakdown  Description: Absence of new skin breakdown  Outcome: Ongoing

## 2020-10-02 NOTE — PROGRESS NOTES
Pulmonary and Critical Care  Progress Note      VITALS:  /75   Pulse 61   Temp 98 °F (36.7 °C) (Oral)   Resp 15   Ht 5' 7\" (1.702 m)   Wt 97 lb 3.2 oz (44.1 kg)   LMP  (LMP Unknown)   SpO2 94%   BMI 15.22 kg/m²     Subjective:   CHIEF COMPLAINT :SOB     HPI:                The patient is a 78 y.o. female with significant past medical history of COPD, on home oxygen, peripheral arterial disease-status post atherectomy  left popliteal TP trunk  and peroneal artery with PTA, aortobifemoral stent graft-2016, left femoral/popliteal-2016, history of breast cancer status post right breast lumpectomy, and radiation therapy, AAA repair, hypertension, hyperlipidemia, chronic tobacco dependence, chronic alcohol use   presents with complaints of SOB x 2 days. She has a 61 pk yr smoking on 2 L.min of oxygen at home. She has no sick exposure, little cough and little phlegm, no hemoptysis, no loss of weight, fair appetite. She had a CT chest which showed mucus plugging and RML collapse. She also has small multipel nodules in the RLL. Objective:   PHYSICAL EXAM:    LUNGS:Occasional basal crackles  Abd-soft, BS+,NT  Ext - no pedal edema  CVS-s1s2, no murmurs      DATA:    CBC:  Recent Labs     09/30/20  0338   WBC 5.0   RBC 4.31   HGB 13.8   HCT 40.9      MCV 94.9   MCH 32.0*   MCHC 33.7   RDW 15.2*   SEGSPCT 64.7      BMP:  Recent Labs     09/30/20  0338 10/01/20  1138    131*   K 4.7 4.1   CL 96* 94*   CO2 28 24   BUN 12 12   CREATININE 0.8 0.6   CALCIUM 9.0 9.1   GLUCOSE 135* 211*      ABG:  No results for input(s): PH, PO2ART, QAO0CIY, HCO3, BEART, O2SAT in the last 72 hours. BNP  No results found for: BNP   D-Dimer:  Lab Results   Component Value Date    [facility] (H) 09/30/2020      Radiology:   1. No acute cardiopulmonary disease. 2. Emphysema. 3. The nodular infiltrates previously noted in the right lower lobe are not    well visualized radiographically     1.        Assessment/Plan Patient Active Problem List    Diagnosis Date Noted    Bilateral hearing loss 08/16/2019     Priority: High    Osteoporosis 12/23/2016     Priority: High     Overview Note:     Completed 5 years of Fosamax      Moderate COPD (chronic obstructive pulmonary disease) ([facility] [address] 01/20/2014     Priority: High    Alcohol abuse 10/11/2012     Priority: High    COPD with acute exacerbation ([facility] [address] 09/30/2020    Chest pain     Functional urinary incontinence 07/17/2020    Unsteady gait 07/17/2020    Cachectic ([facility] [address] 06/03/2019    History of breast cancer 12/20/2017    Tobacco abuse 12/23/2016    Hyperlipidemia     Peripheral vascular disease ([facility] [address] 12/07/2011   Patient is lying in the bed. . She is in mild resp distress    SOB  COPD  RLL nodules  RML atelectasis likely sec to the mucus plugging- improving  Cachexia  Debility       1. De escalte abx soon  2. ICS  3. CPT  4. Mucomyst  5. Keep sats > 92%  6. Decrease Solumedrol  7. Await placement  8. C/w present management  No follow-ups on file.     Electronically signed by Siomara Curry MD on 10/2/2020 at 12:24 PM

## 2020-10-02 NOTE — PROGRESS NOTES
Hospitalist Progress Note      Name:  Irasema Payan /Age/Sex: 1940  (78 y.o. female)   MRN & CSN:  [id number] & [id number] Admission Date/Time: 2020  2:55 AM   Location:  [address] [place] [address] PCP: Marlon Benitez MD       Irasema Payan is a 78 y.o.  female  who presents with Shortness of Breath      Assessment and Plan:   COPD exacerbation on chronic resp failure  - still some wheezes today, continue tx and likely d/c tomorrow if stable  -duonebs  -IV methylprednisolone taper now  - guaifenacin  - NC O2  - CTA chest: neg PE, mucous plugging noted along with irregular nodular infiltrates   - consult pulmonology, repeat CT in 6 months  - ambulate and increase activity  - IS     Chest pain r/o acs  - stress test: WNL  - EKG revealed T wave inversions in inferior leads, troponin negative  - plavix + statin  - Repeat troponin, EKG.  - Cardiology consult  - 2D echo: ef 70%, mod TR, mod MR     PAD  S/p AAA repair  HTN  HLD  chronic tobacco dependence  chronic alcohol use   Hx of breast ca            Diet DIET CARDIAC;   Code Status Full Code     Medications:   Medications:    azithromycin  500 mg Intravenous Q24H    methylPREDNISolone  40 mg Intravenous Q12H    acetylcysteine  600 mg Inhalation BID    sodium chloride flush  10 mL Intravenous 2 times per day    aspirin  81 mg Oral Daily    enoxaparin  40 mg Subcutaneous Daily    ipratropium-albuterol  1 ampule Inhalation Q4H WA    guaiFENesin  600 mg Oral BID    thiamine  100 mg Oral Daily    multivitamin  1 tablet Oral Daily    nicotine  1 patch Transdermal Daily    atorvastatin  80 mg Oral Daily    clopidogrel  75 mg Oral Daily    fluticasone  4 puff Inhalation BID    folic acid  1 mg Oral Daily    pantoprazole  40 mg Oral QAM AC      Infusions:   PRN Meds: sodium chloride flush, 10 mL, PRN  acetaminophen, 650 mg, Q6H PRN    Or  acetaminophen, 650 mg, Q6H PRN  polyethylene glycol, 17 g, Daily PRN  promethazine, 12.5 mg, Q6H PRN    Or  ondansetron, 4 mg, Q6H PRN  LORazepam, 1 mg, Q1H PRN    Or  LORazepam, 1 mg, Q1H PRN    Or  LORazepam, 2 mg, Q1H PRN    Or  LORazepam, 2 mg, Q1H PRN    Or  LORazepam, 3 mg, Q1H PRN    Or  LORazepam, 3 mg, Q1H PRN    Or  LORazepam, 4 mg, Q1H PRN    Or  LORazepam, 4 mg, Q1H PRN  magnesium sulfate, 1 g, PRN      Subjective:   Doing a bit better, no distress    Objective: Intake/Output Summary (Last 24 hours) at 10/2/2020 0947  Last data filed at 10/2/2020 0700  Gross per 24 hour   Intake 260 ml   Output --   Net 260 ml      Vitals:   Vitals:    10/02/20 0802   BP:    Pulse:    Resp:    Temp:    SpO2: 94%     Physical Exam:   Gen:  awake, alert, no apparent distress  Head/Eyes:  Normocephalic atraumatic, EOMI   NECK:   symmetrical, trachea midline  LUNGS: Normal Effort +right side mild wheezes noted  CARDIOVASCULAR:  Normal rate  ABDOMEN:  non distended  MUSCULOSKELETAL:  ROM WNL  NEUROLOGIC: Alert and Oriented,  Cranial nerves II-XII are grossly intact.    SKIN:  no bruising or bleeding, normal skin color,  no redness      Data:       CBC   Recent Labs     09/30/20  0338   WBC 5.0   HGB 13.8   HCT 40.9         BMP   Recent Labs     09/30/20  0338 10/01/20  1138    131*   K 4.7 4.1   CL 96* 94*   CO2 28 24   BUN 12 12   CREATININE 0.8 0.6         Electronically signed by Huong Rose MD on 10/2/2020 at 9:47 AM

## 2020-10-02 NOTE — PROGRESS NOTES
Comprehensive Nutrition Assessment    Type and Reason for Visit:  Reassess    Nutrition Recommendations/Plan:   Add standard high calorie oral nutrition supplement with meals  Encourage po intake as able  Will closely monitor po intake, nutrition status, weight trends, poc    Nutrition Assessment:  pt receiving solumedrol, abx, +MV, thiamin, folic acid, consuming % of cardiac diet, pt remains at high nutrition risk    Malnutrition Assessment:  Malnutrition Status:  Insufficient data(patient off unit on visit for testing)    Context:  Chronic Illness       Estimated Daily Nutrient Needs:  Energy (kcal):  7392-4035 (35-40 sushma/kg minimum) additional 350-500 as needed for gain; Weight Used for Energy Requirements:  Current     Protein (g):  63 (1.5 g/kg); Weight Used for Protein Requirements:  Current        Fluid (ml/day):  2959-4391 (1ml/sushma); Weight Used for Fluid Requirements:  Current      Nutrition Related Findings:  Glucose-211      Wounds:  None(Jim-17)       Current Nutrition Therapies:    DIET CARDIAC;     Anthropometric Measures:  · Height: 5' 7.01\" (170.2 cm)  · Current Body Weight: 97 lb 3.6 oz (44.1 kg)   · Admission Body Weight: 91 lb 11.4 oz (41.6 kg)    · Usual Body Weight: 93 lb 11.1 oz (42.5 kg)(per hx wt last year)     · Ideal Body Weight: 135 lbs; % Ideal Body Weight 72 %   · BMI: 15.2  · BMI Categories: Underweight (BMI less than 22) age over 72       Nutrition Diagnosis:   · Predicted inadequate energy intake related to catabolic illness, increase demand for energy/nutrients as evidenced by BMI    Nutrition Interventions:   Food and/or Nutrient Delivery:  Start Oral Nutrition Supplement, Continue Current Diet  Nutrition Education/Counseling:  No recommendation at this time   Coordination of Nutrition Care:  Continued Inpatient Monitoring    Goals:  pt will consume greater than 75% of meals and supplements       Nutrition Monitoring and Evaluation:   Food/Nutrient Intake Outcomes:  Food and Nutrient Intake, Supplement Intake  Physical Signs/Symptoms Outcomes:  Biochemical Data, Weight, GI Status, Hemodynamic Status, Nutrition Focused Physical Findings     Discharge Planning:     Too soon to determine     Electronically signed by Blossom Antonio MS, RD, LD on 10/2/20 at 4:29 PM EDT    Contact: [phone]

## 2020-10-03 PROCEDURE — 6360000002 HC RX W HCPCS: Performed by: INTERNAL MEDICINE

## 2020-10-03 PROCEDURE — 2700000000 HC OXYGEN THERAPY PER DAY

## 2020-10-03 PROCEDURE — 94150 VITAL CAPACITY TEST: CPT

## 2020-10-03 PROCEDURE — 2580000003 HC RX 258: Performed by: FAMILY MEDICINE

## 2020-10-03 PROCEDURE — 94761 N-INVAS EAR/PLS OXIMETRY MLT: CPT

## 2020-10-03 PROCEDURE — 94664 DEMO&/EVAL PT USE INHALER: CPT

## 2020-10-03 PROCEDURE — 6370000000 HC RX 637 (ALT 250 FOR IP): Performed by: INTERNAL MEDICINE

## 2020-10-03 PROCEDURE — 2580000003 HC RX 258: Performed by: INTERNAL MEDICINE

## 2020-10-03 PROCEDURE — 99232 SBSQ HOSP IP/OBS MODERATE 35: CPT | Performed by: INTERNAL MEDICINE

## 2020-10-03 PROCEDURE — 6360000002 HC RX W HCPCS: Performed by: FAMILY MEDICINE

## 2020-10-03 PROCEDURE — 94640 AIRWAY INHALATION TREATMENT: CPT

## 2020-10-03 PROCEDURE — 1200000000 HC SEMI PRIVATE

## 2020-10-03 RX ADMIN — Medication 4 PUFF: at 08:45

## 2020-10-03 RX ADMIN — ENOXAPARIN SODIUM 40 MG: 40 INJECTION SUBCUTANEOUS at 09:25

## 2020-10-03 RX ADMIN — ACETYLCYSTEINE 600 MG: 200 SOLUTION ORAL; RESPIRATORY (INHALATION) at 08:44

## 2020-10-03 RX ADMIN — ATORVASTATIN CALCIUM 80 MG: 80 TABLET, FILM COATED ORAL at 09:24

## 2020-10-03 RX ADMIN — CLOPIDOGREL BISULFATE 75 MG: 75 TABLET ORAL at 09:25

## 2020-10-03 RX ADMIN — Medication 4 PUFF: at 19:44

## 2020-10-03 RX ADMIN — GUAIFENESIN 600 MG: 600 TABLET, EXTENDED RELEASE ORAL at 22:18

## 2020-10-03 RX ADMIN — IPRATROPIUM BROMIDE AND ALBUTEROL SULFATE 1 AMPULE: .5; 3 SOLUTION RESPIRATORY (INHALATION) at 08:43

## 2020-10-03 RX ADMIN — IPRATROPIUM BROMIDE AND ALBUTEROL SULFATE 1 AMPULE: .5; 3 SOLUTION RESPIRATORY (INHALATION) at 16:09

## 2020-10-03 RX ADMIN — Medication 100 MG: at 09:27

## 2020-10-03 RX ADMIN — IPRATROPIUM BROMIDE AND ALBUTEROL SULFATE 1 AMPULE: .5; 3 SOLUTION RESPIRATORY (INHALATION) at 11:42

## 2020-10-03 RX ADMIN — ACETYLCYSTEINE 600 MG: 200 SOLUTION ORAL; RESPIRATORY (INHALATION) at 19:44

## 2020-10-03 RX ADMIN — THERA TABS 1 TABLET: TAB at 09:28

## 2020-10-03 RX ADMIN — GUAIFENESIN 600 MG: 600 TABLET, EXTENDED RELEASE ORAL at 09:28

## 2020-10-03 RX ADMIN — FOLIC ACID 1 MG: 1 TABLET ORAL at 09:26

## 2020-10-03 RX ADMIN — IPRATROPIUM BROMIDE AND ALBUTEROL SULFATE 1 AMPULE: .5; 3 SOLUTION RESPIRATORY (INHALATION) at 19:44

## 2020-10-03 RX ADMIN — METHYLPREDNISOLONE SODIUM SUCCINATE 40 MG: 40 INJECTION, POWDER, FOR SOLUTION INTRAMUSCULAR; INTRAVENOUS at 10:03

## 2020-10-03 RX ADMIN — SODIUM CHLORIDE, PRESERVATIVE FREE 10 ML: 5 INJECTION INTRAVENOUS at 10:03

## 2020-10-03 RX ADMIN — PANTOPRAZOLE SODIUM 40 MG: 40 TABLET, DELAYED RELEASE ORAL at 06:49

## 2020-10-03 RX ADMIN — AZITHROMYCIN MONOHYDRATE 500 MG: 500 INJECTION, POWDER, LYOPHILIZED, FOR SOLUTION INTRAVENOUS at 10:06

## 2020-10-03 RX ADMIN — ASPIRIN 81 MG CHEWABLE TABLET 81 MG: 81 TABLET CHEWABLE at 09:24

## 2020-10-03 RX ADMIN — SODIUM CHLORIDE, PRESERVATIVE FREE 10 ML: 5 INJECTION INTRAVENOUS at 22:19

## 2020-10-03 NOTE — PROGRESS NOTES
Chun Galo BSN RN, clinical adjunct for [facility] [place] RN students [phone]

## 2020-10-03 NOTE — PROGRESS NOTES
Hospitalist Progress Note      Name:  Christine Carrero /Age/Sex: 1940  (78 y.o. female)   MRN & CSN:  [id number] & [id number] Admission Date/Time: 2020  2:55 AM   Location:  [address] [place] [address] PCP: Junella Bosworth, MD       Christine Carrero is a 78 y.o.  female  who presents with Shortness of Breath      Assessment and Plan:   COPD exacerbation on chronic resp failure  - needs to ambualte and see how her O2 is, she wears NC o2 at night only  -duonebs  -IV methylprednisolone tapered   - guaifenacin  - NC O2  - CTA chest: neg PE, mucous plugging noted along with irregular nodular infiltrates   - consult pulmonology, repeat CT in 6 months  - ambulate and increase activity  - IS     Chest pain r/o acs  - stress test: WNL  - EKG revealed T wave inversions in inferior leads, troponin negative  - plavix + statin  - Repeat troponin, EKG.  - Cardiology consult  - 2D echo: ef 70%, mod TR, mod MR     PAD  S/p AAA repair  HTN  HLD  chronic tobacco dependence  chronic alcohol use   Hx of breast ca            Diet DIET CARDIAC;   Dietary Nutrition Supplements: Standard High Calorie Oral Supplement   Code Status Full Code     Medications:   Medications:    methylPREDNISolone  40 mg Intravenous Daily    azithromycin  500 mg Intravenous Q24H    acetylcysteine  600 mg Inhalation BID    sodium chloride flush  10 mL Intravenous 2 times per day    aspirin  81 mg Oral Daily    enoxaparin  40 mg Subcutaneous Daily    ipratropium-albuterol  1 ampule Inhalation Q4H WA    guaiFENesin  600 mg Oral BID    thiamine  100 mg Oral Daily    multivitamin  1 tablet Oral Daily    nicotine  1 patch Transdermal Daily    atorvastatin  80 mg Oral Daily    clopidogrel  75 mg Oral Daily    fluticasone  4 puff Inhalation BID    folic acid  1 mg Oral Daily    pantoprazole  40 mg Oral QAM AC      Infusions:   PRN Meds: benzonatate, 100 mg, TID PRN  sodium chloride flush, 10 mL, PRN  acetaminophen, 650 mg, Q6H PRN    Or  acetaminophen, 650 mg,

## 2020-10-04 LAB
MAGNESIUM: 1.8 MG/DL (ref 1.8–2.4)
POTASSIUM SERPL-SCNC: 4.5 MMOL/L (ref 3.5–5.1)

## 2020-10-04 PROCEDURE — 6370000000 HC RX 637 (ALT 250 FOR IP): Performed by: INTERNAL MEDICINE

## 2020-10-04 PROCEDURE — 6360000002 HC RX W HCPCS: Performed by: INTERNAL MEDICINE

## 2020-10-04 PROCEDURE — 1200000000 HC SEMI PRIVATE

## 2020-10-04 PROCEDURE — 36415 COLL VENOUS BLD VENIPUNCTURE: CPT

## 2020-10-04 PROCEDURE — 83735 ASSAY OF MAGNESIUM: CPT

## 2020-10-04 PROCEDURE — 94761 N-INVAS EAR/PLS OXIMETRY MLT: CPT

## 2020-10-04 PROCEDURE — 84132 ASSAY OF SERUM POTASSIUM: CPT

## 2020-10-04 PROCEDURE — 2580000003 HC RX 258: Performed by: FAMILY MEDICINE

## 2020-10-04 PROCEDURE — 94150 VITAL CAPACITY TEST: CPT

## 2020-10-04 PROCEDURE — 2580000003 HC RX 258: Performed by: INTERNAL MEDICINE

## 2020-10-04 PROCEDURE — 94640 AIRWAY INHALATION TREATMENT: CPT

## 2020-10-04 PROCEDURE — 6360000002 HC RX W HCPCS: Performed by: FAMILY MEDICINE

## 2020-10-04 PROCEDURE — 2700000000 HC OXYGEN THERAPY PER DAY

## 2020-10-04 RX ADMIN — SODIUM CHLORIDE, PRESERVATIVE FREE 10 ML: 5 INJECTION INTRAVENOUS at 09:44

## 2020-10-04 RX ADMIN — Medication 4 PUFF: at 09:03

## 2020-10-04 RX ADMIN — ASPIRIN 81 MG CHEWABLE TABLET 81 MG: 81 TABLET CHEWABLE at 09:44

## 2020-10-04 RX ADMIN — Medication 100 MG: at 09:44

## 2020-10-04 RX ADMIN — PANTOPRAZOLE SODIUM 40 MG: 40 TABLET, DELAYED RELEASE ORAL at 05:45

## 2020-10-04 RX ADMIN — IPRATROPIUM BROMIDE AND ALBUTEROL SULFATE 1 AMPULE: .5; 3 SOLUTION RESPIRATORY (INHALATION) at 09:02

## 2020-10-04 RX ADMIN — GUAIFENESIN 600 MG: 600 TABLET, EXTENDED RELEASE ORAL at 19:49

## 2020-10-04 RX ADMIN — FOLIC ACID 1 MG: 1 TABLET ORAL at 09:44

## 2020-10-04 RX ADMIN — IPRATROPIUM BROMIDE AND ALBUTEROL SULFATE 1 AMPULE: .5; 3 SOLUTION RESPIRATORY (INHALATION) at 17:05

## 2020-10-04 RX ADMIN — AZITHROMYCIN MONOHYDRATE 500 MG: 500 INJECTION, POWDER, LYOPHILIZED, FOR SOLUTION INTRAVENOUS at 10:41

## 2020-10-04 RX ADMIN — ATORVASTATIN CALCIUM 80 MG: 80 TABLET, FILM COATED ORAL at 09:44

## 2020-10-04 RX ADMIN — THERA TABS 1 TABLET: TAB at 09:44

## 2020-10-04 RX ADMIN — CLOPIDOGREL BISULFATE 75 MG: 75 TABLET ORAL at 09:44

## 2020-10-04 RX ADMIN — GUAIFENESIN 600 MG: 600 TABLET, EXTENDED RELEASE ORAL at 09:44

## 2020-10-04 RX ADMIN — ENOXAPARIN SODIUM 40 MG: 40 INJECTION SUBCUTANEOUS at 09:44

## 2020-10-04 RX ADMIN — SODIUM CHLORIDE, PRESERVATIVE FREE 10 ML: 5 INJECTION INTRAVENOUS at 19:49

## 2020-10-04 RX ADMIN — IPRATROPIUM BROMIDE AND ALBUTEROL SULFATE 1 AMPULE: .5; 3 SOLUTION RESPIRATORY (INHALATION) at 21:38

## 2020-10-04 RX ADMIN — Medication 4 PUFF: at 21:42

## 2020-10-04 ASSESSMENT — PAIN SCALES - GENERAL
PAINLEVEL_OUTOF10: 0
PAINLEVEL_OUTOF10: 0

## 2020-10-04 NOTE — PROGRESS NOTES
Hospitalist Progress Note      Name:  Familia Pearce /Age/Sex: 1940  (78 y.o. female)   MRN & CSN:  [id number] & [id number] Admission Date/Time: 2020  2:55 AM   Location:  [address] [place] [address] PCP: Trenton Huynh MD       Familia Pearce is a 78 y.o.  female  who presents with Shortness of Breath      Assessment and Plan:   COPD exacerbation on chronic resp failure  - ambulated yesterday and was very weak, pt/ot consulted  -duonebs  -IV methylprednisolone tapered   - guaifenacin  - NC O2  - CTA chest: neg PE, mucous plugging noted along with irregular nodular infiltrates   - consult pulmonology, repeat CT in 6 months  - ambulate and increase activity  - IS     Chest pain r/o acs  - stress test: WNL  - EKG revealed T wave inversions in inferior leads, troponin negative  - plavix + statin  - Repeat troponin, EKG.  - Cardiology consult  - 2D echo: ef 70%, mod TR, mod MR     PAD  S/p AAA repair  HTN  HLD  chronic tobacco dependence  chronic alcohol use   Hx of breast ca            Diet DIET CARDIAC;   Dietary Nutrition Supplements: Standard High Calorie Oral Supplement   Code Status Full Code     Medications:   Medications:    azithromycin  500 mg Intravenous Q24H    sodium chloride flush  10 mL Intravenous 2 times per day    aspirin  81 mg Oral Daily    enoxaparin  40 mg Subcutaneous Daily    ipratropium-albuterol  1 ampule Inhalation Q4H WA    guaiFENesin  600 mg Oral BID    thiamine  100 mg Oral Daily    multivitamin  1 tablet Oral Daily    nicotine  1 patch Transdermal Daily    atorvastatin  80 mg Oral Daily    clopidogrel  75 mg Oral Daily    fluticasone  4 puff Inhalation BID    folic acid  1 mg Oral Daily    pantoprazole  40 mg Oral QAM AC      Infusions:   PRN Meds: benzonatate, 100 mg, TID PRN  sodium chloride flush, 10 mL, PRN  acetaminophen, 650 mg, Q6H PRN    Or  acetaminophen, 650 mg, Q6H PRN  polyethylene glycol, 17 g, Daily PRN  promethazine, 12.5 mg, Q6H PRN    Or  ondansetron, 4 mg,

## 2020-10-05 ENCOUNTER — TELEPHONE (OUTPATIENT)
Dept: FAMILY MEDICINE CLINIC | Age: 80
End: 2020-10-05

## 2020-10-05 VITALS
SYSTOLIC BLOOD PRESSURE: 149 MMHG | HEIGHT: 67 IN | HEART RATE: 58 BPM | DIASTOLIC BLOOD PRESSURE: 80 MMHG | OXYGEN SATURATION: 93 % | BODY MASS INDEX: 14.75 KG/M2 | RESPIRATION RATE: 16 BRPM | WEIGHT: 94 LBS | TEMPERATURE: 98.4 F

## 2020-10-05 PROCEDURE — 6360000002 HC RX W HCPCS: Performed by: FAMILY MEDICINE

## 2020-10-05 PROCEDURE — 94640 AIRWAY INHALATION TREATMENT: CPT

## 2020-10-05 PROCEDURE — 6360000002 HC RX W HCPCS: Performed by: INTERNAL MEDICINE

## 2020-10-05 PROCEDURE — 6370000000 HC RX 637 (ALT 250 FOR IP): Performed by: INTERNAL MEDICINE

## 2020-10-05 PROCEDURE — 99232 SBSQ HOSP IP/OBS MODERATE 35: CPT | Performed by: INTERNAL MEDICINE

## 2020-10-05 PROCEDURE — 2580000003 HC RX 258: Performed by: FAMILY MEDICINE

## 2020-10-05 PROCEDURE — 2580000003 HC RX 258: Performed by: INTERNAL MEDICINE

## 2020-10-05 RX ORDER — METHYLPREDNISOLONE 4 MG/1
TABLET ORAL
Qty: 1 KIT | Refills: 0 | Status: SHIPPED | OUTPATIENT
Start: 2020-10-05 | End: 2020-10-11

## 2020-10-05 RX ORDER — GUAIFENESIN 600 MG/1
600 TABLET, EXTENDED RELEASE ORAL 2 TIMES DAILY
Qty: 14 TABLET | Refills: 0 | Status: SHIPPED | OUTPATIENT
Start: 2020-10-05 | End: 2021-01-01

## 2020-10-05 RX ORDER — BENZONATATE 100 MG/1
100 CAPSULE ORAL 3 TIMES DAILY PRN
Qty: 21 CAPSULE | Refills: 0 | Status: SHIPPED | OUTPATIENT
Start: 2020-10-05 | End: 2020-10-12

## 2020-10-05 RX ORDER — AZITHROMYCIN 250 MG/1
250 TABLET, FILM COATED ORAL SEE ADMIN INSTRUCTIONS
Qty: 6 TABLET | Refills: 0 | Status: SHIPPED | OUTPATIENT
Start: 2020-10-05 | End: 2020-10-10

## 2020-10-05 RX ADMIN — GUAIFENESIN 600 MG: 600 TABLET, EXTENDED RELEASE ORAL at 09:47

## 2020-10-05 RX ADMIN — CLOPIDOGREL BISULFATE 75 MG: 75 TABLET ORAL at 09:47

## 2020-10-05 RX ADMIN — ASPIRIN 81 MG CHEWABLE TABLET 81 MG: 81 TABLET CHEWABLE at 09:56

## 2020-10-05 RX ADMIN — FOLIC ACID 1 MG: 1 TABLET ORAL at 09:48

## 2020-10-05 RX ADMIN — AZITHROMYCIN MONOHYDRATE 500 MG: 500 INJECTION, POWDER, LYOPHILIZED, FOR SOLUTION INTRAVENOUS at 09:55

## 2020-10-05 RX ADMIN — SODIUM CHLORIDE, PRESERVATIVE FREE 10 ML: 5 INJECTION INTRAVENOUS at 10:02

## 2020-10-05 RX ADMIN — ENOXAPARIN SODIUM 40 MG: 40 INJECTION SUBCUTANEOUS at 09:48

## 2020-10-05 RX ADMIN — THERA TABS 1 TABLET: TAB at 09:47

## 2020-10-05 RX ADMIN — PANTOPRAZOLE SODIUM 40 MG: 40 TABLET, DELAYED RELEASE ORAL at 05:42

## 2020-10-05 RX ADMIN — POLYETHYLENE GLYCOL (3350) 17 G: 17 POWDER, FOR SOLUTION ORAL at 05:42

## 2020-10-05 RX ADMIN — ATORVASTATIN CALCIUM 80 MG: 80 TABLET, FILM COATED ORAL at 09:47

## 2020-10-05 RX ADMIN — IPRATROPIUM BROMIDE AND ALBUTEROL SULFATE 1 AMPULE: .5; 3 SOLUTION RESPIRATORY (INHALATION) at 08:08

## 2020-10-05 RX ADMIN — Medication 100 MG: at 09:47

## 2020-10-05 NOTE — DISCHARGE SUMMARY
Blayne Cornell 1940 [id number]  PCP:  Rashida German MD    Admit date: 9/30/2020  Admitting Physician: Brittany Solano MD    Discharge date: 10/5/2020 Discharge Physician: Javier Teixeira MD         Hospital Course and Discharge Diagnoses Include:    COPD exacerbation on chronic resp failure  - stable  -duonebs  -IV methylprednisolone tapered to oral  - guaifenacin  - NC O2  - CTA chest: neg PE, mucous plugging noted along with irregular nodular infiltrates   - consult pulmonology, repeat CT in 6 months  - ambulate and increase activity  - IS  - pt prefers to go home     Chest pain r/o acs  - stress test: WNL  - EKG revealed T wave inversions in inferior leads, troponin negative  - plavix + statin  - Repeat troponin, EKG.  - Cardiology consult  - 2D echo: ef 70%, mod TR, mod MR     PAD  S/p AAA repair  HTN  HLD  chronic tobacco dependence  chronic alcohol use   Hx of breast ca        Physical Exam on Discharge date: 10/05/20  Gen:  awake, alert, no apparent distress  Head/Eyes:  Normocephalic atraumatic, EOMI   NECK:   symmetrical, trachea midline  LUNGS: Normal Effort   CARDIOVASCULAR:  Normal rate  ABDOMEN:  non distended  MUSCULOSKELETAL:  ROM limited  NEUROLOGIC: Alert and Oriented,  Cranial nerves II-XII are grossly intact. SKIN:  no bruising or bleeding, normal skin color,  no redness    Procedures:  See above  Xr Chest Portable    Result Date: 10/2/2020  EXAMINATION: ONE XRAY VIEW OF THE CHEST 10/2/2020 5:26 am COMPARISON: 09/30/2020 HISTORY: ORDERING SYSTEM PROVIDED HISTORY: Hypoxia TECHNOLOGIST PROVIDED HISTORY: Reason for exam:->Hypoxia Reason for Exam: Hypoxia Acuity: Acute Type of Exam: Subsequent/Follow-up FINDINGS: The cardiac silhouette and mediastinal contours are stable. Calcified granuloma is noted in the right lung base with a calcified right infrahilar lymph node. Emphysematous changes are noted in the lungs. There is no focal lung infiltrate.   No pleural effusion or pneumothorax. Surgical clips are noted in the right axilla. 1. No acute cardiopulmonary disease. 2. Emphysema. 3. The nodular infiltrates previously noted in the right lower lobe are not well visualized radiographically. Xr Chest Portable    Result Date: 9/30/2020  EXAMINATION: ONE XRAY VIEW OF THE CHEST 9/30/2020 3:13 am COMPARISON: 03/26/2015 HISTORY: ORDERING SYSTEM PROVIDED HISTORY: sob TECHNOLOGIST PROVIDED HISTORY: Reason for exam:->sob Reason for Exam: SOB Acuity: Unknown Type of Exam: Initial FINDINGS: No focal consolidation, pleural effusion or pneumothorax. The cardiac silhouette and mediastinal contours are stable. No acute bony abnormality. Unchanged right axillary surgical clips. No acute process. Cta Pulmonary W Contrast    Result Date: 9/30/2020  EXAMINATION: CTA OF THE CHEST 9/30/2020 8:40 pm TECHNIQUE: CTA of the chest was performed after the administration of intravenous contrast.  Multiplanar reformatted images are provided for review. MIP images are provided for review. Dose modulation, iterative reconstruction, and/or weight based adjustment of the mA/kV was utilized to reduce the radiation dose to as low as reasonably achievable. COMPARISON: None. HISTORY: ORDERING SYSTEM PROVIDED HISTORY: chest pain, SOB, tachy, elevated d-dimer, check for PE TECHNOLOGIST PROVIDED HISTORY: Reason for exam:->chest pain, SOB, tachy, elevated d-dimer, check for PE Reason for Exam: CHEST PAIN,SOB,TACHY,ELEVATED D DIMER, CHECK FOR PE Acuity: Acute Type of Exam: Initial FINDINGS: Pulmonary Arteries: Pulmonary arteries are adequately opacified for evaluation. No evidence of intraluminal filling defect to suggest pulmonary embolism. Main pulmonary artery is normal in caliber. Mediastinum: No evidence of mediastinal lymphadenopathy. Calcified subcarinal lymph nodes from remote granulomatous disease. The esophagus is partially air-filled and mildly dilated. Heart size is within normal limits.  The heart and pericardium demonstrate no acute abnormality. The thoracic aorta is mildly diffusely ectatic and tortuous. There is no acute abnormality of the thoracic aorta. Lungs/pleura: There is apical predominant emphysema. There is mucous plugging involving the right greater than left bilateral lower lobes and middle lobe. There is complete collapse of the middle lobe. Mucus in the bronchus intermedius. There are several irregular nodular infiltrates in the right lower lobe. Lungs are otherwise grossly clear. There is no pneumothorax or pleural effusion. Upper Abdomen: Limited images of the upper abdomen are unremarkable. Suspect incomplete visualization of an abdominal aortic aneurysm on the last scan obtained (axial image 138). Soft Tissues/Bones: No acute bone or soft tissue abnormality. No evidence of an acute pulmonary embolus. Apical predominant emphysema. Mucous plugging involves the right greater than left bilateral lower lobes and middle lobe. There is complete collapse of the middle lobe. Several irregular nodular infiltrates are present in the right lower lobe. These are likely inflammatory/infectious related to the emphysema and mucous plugging. Tumor cannot be excluded and a follow-up to complete clearing is recommended. Suspect incomplete visualization of an abdominal aortic aneurysm on the last scan obtained. Follow-up CTA of the abdomen/pelvis recommended.      Nm Myocardial Spect Rest Exercise Or Rx    Result Date: 9/30/2020  Cardiac Perfusion Imaging   Demographics   Patient Name      Sonia VICTOR       Date of study        09/30/2020   Date of Birth     1940         Gender               Female   Age               78 year(s)         Race                 Black   Patient Number    [id number]         Room Number          7800   Visit Number      [id number]          Height               67 inches   Corporate ID      [id number]           Weight               91 pounds   Accession Number  [id number]                                        NM Technologist      Jaziel Duarte MD        Cardiologist         Sumaya SCOTT   Conclusions   Summary  Normal tracer uptake in all segments of myocardium on stress ans rest  images. Normal Lexiscan nuclear scintigraphic study suggestive of normal  myocardial perfusion. Gated images demonstrate normal left ventricular  systolic function with EF of 60 %. Signatures   ------------------------------------------------------------------  Electronically signed by Janeen Brewer MD  (Interpreting cardiologist) on 09/30/2020 at 13:56  ------------------------------------------------------------------  Procedure Procedure Type:   Nuclear Stress Test:Pharmacological, Myocardial Perfusion Imaging with  Pharm, NM MYOCARDIAL SPECT REST EXERCISE OR RX  Indications: Chest pain. Risk Factors   The patient risk factors include:hypercholesterolemia and chronic lung  disease. Stress Protocols   Resting ECG  Normal sinus rhythm. Resting HR:107 bpm  Resting BP:126/84 mmHg  Stress Protocol:Pharmacologic - Lexiscan  Peak HR:112 bpm                  HR/BP product:05007  Peak BP:126/84 mmHg  Predicted HR: 139 bpm  % of predicted HR: 81   Exercise duration: 01:00 min  Reason for termination:Completed   Symptoms  No symptoms with Lexiscan infusion. Procedure Medications   - Lexiscan I.V. bolus (over 15sec.) 0.4 mg admininstered @ 09/30/2020 11:30.   - Aminophylline I.V. 75 mg admininstered @ 09/30/2020 11:33. Imaging Protocols   Rest                             Stress   Isotope:Sestamibi 99mTc          Isotope: Sestamibi 99mTc  Isotope dose:11 mCi              Isotope dose:31.3 mCi  Administration route: I.V. Administration route: I.V.   Injection Date:09/30/2020 10:10  Injection Date:09/30/2020 11:30  Scan Date:09/30/2020 10:55       Scan Date:09/30/2020 12:15   Technique:        SPECT          Technique:        SPECT   Procedure Description   Upon patient arrival, the patient is identified using two identifiers and  the physician order is verified. An IV is established and 8-11mCi of 99mTc  Sestamibi is intravenously injected and followed with 10mL 0.9% Normal  Saline flush. A circulation period of 45 minutes occurs prior to resting  SPECT imaging. After imaging is complete the patient is escorted to the  stress lab. The patient is connected to the ECG and blood pressure is  measured. The RN starts the stress portion of the exam and rapidly  intravenously injects Lexiscan (regadenosine) 0.4mg over a period of 10 to15  seconds and follows with 5mL 0.9% Normal Saline flush. Immediately following  the Nuclear Technologist intravenously injects 22-33mCi of 99mTc Sestamibi  and 5mL 0.9% Normal Saline flush. After completion, recovery, and removal of  the IV, the patient rests during the second circulation period of 45  minutes. Final stress SPECT gated imaging is performed. The patient may  return home or to their room after stress imaging. The images are processed  and final charting is completed and sent to the appropriate cardiologist for  interpretation and reporting. Perfusion Interpretation   Normal tracer uptake in all segments of myocardium on stress ans rest  images. Imaging Results    Summed scores     - Summed stress score: 5     - Summed rest score: 3     - Summed difference score:    2   Rest ejection  Ejection fraction:85 %  EDV :33 ml  ESV :5 ml  Stroke volume :28 ml  Medical History   Accession#:  [id number]  Admission Data Admission date: 09/30/2020 Admission Time: 02:55 Hospital Status: Inpatient.       Significant Diagnostic Studies at discharge:   CBC:   Lab Results   Component Value Date    WBC 5.0 09/30/2020    RBC 4.31 09/30/2020    HGB 13.8 09/30/2020    HCT 40.9 09/30/2020    MCV 94.9 09/30/2020    MCH 32.0 09/30/2020    [facility] 33.7 09/30/2020    RDW 15.2 09/30/2020     09/30/2020    MPV 8.5 09/30/2020       Patient Instructions:     Medication List      START taking these medications    azithromycin 250 MG tablet  Commonly known as:  ZITHROMAX  Take 1 tablet by mouth See Admin Instructions for 5 days 500mg on day 1 followed by 250mg on days 2 - 5     benzonatate 100 MG capsule  Commonly known as:  TESSALON  Take 1 capsule by mouth 3 times daily as needed for Cough     guaiFENesin 600 MG extended release tablet  Commonly known as:  MUCINEX  Take 1 tablet by mouth 2 times daily     methylPREDNISolone 4 MG tablet  Commonly known as:  MEDROL DOSEPACK  Take by mouth. CONTINUE taking these medications    * albuterol (2.5 MG/3ML) 0.083% nebulizer solution  Commonly known as:  PROVENTIL  Take 3 mLs by nebulization every 6 hours as needed for Wheezing or Shortness of Breath     * albuterol sulfate  (90 Base) MCG/ACT inhaler  Commonly known as:  ProAir HFA  Inhale 2 puffs into the lungs every 4 hours as needed for Wheezing or Shortness of Breath     aspirin 325 MG tablet     atorvastatin 80 MG tablet  Commonly known as:  LIPITOR  Take 1 tablet by mouth daily     calcium carbonate-vitamin D 600-400 MG-UNIT Tabs per tab  Commonly known as:  Calcium 600 + D  Twice daily by mouth     clopidogrel 75 MG tablet  Commonly known as:  Plavix  Take 1 tablet by mouth daily     Ensure Active Liqd  1 can 3 times per day     Flovent Diskus 250 MCG/BLIST Aepb inhaler  Generic drug:  fluticasone propionate  1 puff twice per day     folic acid 1 MG tablet  Commonly known as:  FOLVITE  Take 1 tablet by mouth daily     ipratropium 17 MCG/ACT inhaler  Commonly known as:   Atrovent HFA  Inhale 1 puff into the lungs 4 times daily     Mini Plus Nebulizer Misc  Use 4 times per day as needed     OXYGEN     Spacer/Aero Chamber Mouthpiece Misc  1 each by Does not apply route once as needed (SOB)     therapeutic multivitamin-minerals tablet  Take 1 tablet by mouth daily     UNABLE TO FIND  Shingles vaccine     UNABLE TO FIND  Shower Chair     vitamin B-1 100 MG tablet  Commonly known as:  THIAMINE  Take 1 tablet by mouth daily         * This list has 2 medication(s) that are the same as other medications prescribed for you. Read the directions carefully, and ask your doctor or other care provider to review them with you. STOP taking these medications    naltrexone 50 MG tablet  Commonly known as:  DEPADE           Where to Get Your Medications      These medications were sent to [address], [address] [place] [address] Dear Daniel [phone] Stefano Gerardo [address] [place], [address] [place] [id number]    Phone:  [phone]   · azithromycin 250 MG tablet  · benzonatate 100 MG capsule  · guaiFENesin 600 MG extended release tablet  · methylPREDNISolone 4 MG tablet            Code Status: Full Code     Consults:   IP CONSULT TO HOSPITALIST  IP CONSULT TO CARDIOLOGY  IP CONSULT TO PULMONOLOGY    Diet: cardiac diet    Activity: activity as tolerated   Work:    Discharged Condition: good    Prognosis: Fair - Good    Disposition: home      Follow-up with   1. PCP within   5-7    Days    Follow up labs: none       Discharge Physician Signed: Antoine Patino M.D. The patient was seen and examined on day of discharge and this discharge summary is in conjunction with any daily progress note from day of discharge.   Time spent on discharge in the examination, evaluation, counseling and review of medications and discharge plan: 34 minutes

## 2020-10-05 NOTE — TELEPHONE ENCOUNTER
Edson Juarez called stating she got an referral from the hospital for patient they are wanting her to start home care. Is it okay with you to start. Please advise?

## 2020-10-05 NOTE — PROGRESS NOTES
Pulmonary and Critical Care  Progress Note      VITALS:  BP (!) 149/80   Pulse 58   Temp 98.4 °F (36.9 °C) (Oral)   Resp 16   Ht 5' 7.01\" (1.702 m)   Wt 94 lb (42.6 kg)   LMP  (LMP Unknown)   SpO2 93%   BMI 14.72 kg/m²     Subjective:   CHIEF COMPLAINT :SOB     HPI:                The patient is a 78 y.o. female with significant past medical history of COPD, on home oxygen, peripheral arterial disease-status post atherectomy  left popliteal TP trunk  and peroneal artery with PTA, aortobifemoral stent graft-2016, left femoral/popliteal-2016, history of breast cancer status post right breast lumpectomy, and radiation therapy, AAA repair, hypertension, hyperlipidemia, chronic tobacco dependence, chronic alcohol use   presents with complaints of SOB x 2 days. She has a 61 pk yr smoking on 2 L.min of oxygen at home. She has no sick exposure, little cough and little phlegm, no hemoptysis, no loss of weight, fair appetite. She had a CT chest which showed mucus plugging and RML collapse. She also has small multipel nodules in the RLL.     Objective:   PHYSICAL EXAM:    LUNGS:Occasional basal crackles  Abd-soft, BS+,NT  Ext - no pedal edema  CVS-s1s2, no murmurs      DATA:    CBC:  No results for input(s): WBC, RBC, HGB, HCT, PLT, MCV, MCH, MCHC, RDW, NRBC, SEGSPCT, BANDSPCT in the last 72 hours. BMP:  Recent Labs     10/04/20  1416   K 4.5      ABG:  No results for input(s): PH, PO2ART, BQT2EBF, HCO3, BEART, O2SAT in the last 72 hours. BNP  No results found for: BNP   D-Dimer:  Lab Results   Component Value Date    DDIMER 1955 (H) 09/30/2020      1.  Radiology: None      Assessment/Plan     Patient Active Problem List    Diagnosis Date Noted    Bilateral hearing loss 08/16/2019     Priority: High    Osteoporosis 12/23/2016     Priority: High     Overview Note:     Completed 5 years of Fosamax      Moderate COPD (chronic obstructive pulmonary disease) ([facility] 01/20/2014     Priority: High    Alcohol abuse 10/11/2012     Priority: High    COPD with acute exacerbation ([facility] 09/30/2020    Chest pain     Functional urinary incontinence 07/17/2020    Unsteady gait 07/17/2020    Cachectic ([facility] [facility] 06/03/2019    History of breast cancer 12/20/2017    Tobacco abuse 12/23/2016    Hyperlipidemia     Peripheral vascular disease ([facility] 12/07/2011     Patient is lying in the bed. . She is not in acute resp distress     SOB  COPD  RLL nodules  RML atelectasis likely sec to the mucus plugging- improving  Cachexia  Debility       1. Complete Abx  2. ICS  3. CPT  4. OOB  5. Keep sats > 92%  6. Await placement  7. C/w present management  No follow-ups on file.     Electronically signed by Albert Kaiser MD on 10/5/2020 at 11:52 AM

## 2020-10-06 ENCOUNTER — TELEPHONE (OUTPATIENT)
Dept: FAMILY MEDICINE CLINIC | Age: 80
End: 2020-10-06

## 2020-10-06 NOTE — TELEPHONE ENCOUNTER
Patient was sent home from hospital with script albuterol for nebulizer. Patient does not have a nebulizer. Please sent script to [facility].  Patients bp today was 90/62. Patient is increasing fluids. Patients granddaughter getting a pulse ox and BP cuff.

## 2020-10-07 NOTE — TELEPHONE ENCOUNTER
Spoke with daughter and states that they have had the script for the nebulizer since July but has not ever got it. [facility] and [facility] their [facility] script and it was completed and faxed back. LM on VM that it was sent and to call the office with any other questions or concerns.

## 2020-10-08 ENCOUNTER — VIRTUAL VISIT (OUTPATIENT)
Dept: FAMILY MEDICINE CLINIC | Age: 80
End: 2020-10-08
Payer: COMMERCIAL

## 2020-10-08 PROCEDURE — 99442 PR PHYS/QHP TELEPHONE EVALUATION 11-20 MIN: CPT | Performed by: FAMILY MEDICINE

## 2020-10-08 RX ORDER — DONEPEZIL HYDROCHLORIDE 5 MG/1
5 TABLET, FILM COATED ORAL NIGHTLY
Qty: 30 TABLET | Refills: 0 | Status: SHIPPED | OUTPATIENT
Start: 2020-10-08 | End: 2020-10-21 | Stop reason: SDUPTHER

## 2020-10-08 ASSESSMENT — PATIENT HEALTH QUESTIONNAIRE - PHQ9
SUM OF ALL RESPONSES TO PHQ QUESTIONS 1-9: 1
SUM OF ALL RESPONSES TO PHQ9 QUESTIONS 1 & 2: 1
SUM OF ALL RESPONSES TO PHQ QUESTIONS 1-9: 1
2. FEELING DOWN, DEPRESSED OR HOPELESS: 0
1. LITTLE INTEREST OR PLEASURE IN DOING THINGS: 1

## 2020-10-08 NOTE — PATIENT INSTRUCTIONS
Early voting starts October sixth. VOTE VOTE VOTE VOTE VOTE VOTE        PLEASE BRING YOUR MEDICATIONS TO ALL APPOINTMENTS    The diagnoses and medications listed in this after visit summary may not be accurate at the time of check out. Please check MY CHART in 28-48 hours for possible corrections. Late cancellation policy: So that we can better accommodate people who are sick, please give our office 24 hour notice for an appointment cancellation. Thank you. Missed appointments: Your care is very important to us. It is important that you keep your scheduled appointments. Multiple missed appointments will lead to a dismissal from the office. Later arrival policy: If you are more than 10 minutes late for your appointment, you will be asked to reschedule. Please allow 5-7 business days for paperwork to be processed. It is important that you check your MY Chart messages, as they include appointment reminders, test results, and other important information. If you have forgotten your password, please call [phone].

## 2020-10-09 ENCOUNTER — TELEPHONE (OUTPATIENT)
Dept: FAMILY MEDICINE CLINIC | Age: 80
End: 2020-10-09

## 2020-10-09 RX ORDER — DONEPEZIL HYDROCHLORIDE 5 MG/1
TABLET, FILM COATED ORAL
Qty: 90 TABLET | OUTPATIENT
Start: 2020-10-09

## 2020-10-09 NOTE — PROGRESS NOTES
Barbi Solorzano is a 78 y.o. female evaluated via telephone on 10/8/2020. Consent:  She and/or health care decision maker is aware that that she may receive a bill for this telephone service, depending on her insurance coverage, and has provided verbal consent to proceed: Yes    COPD exacerbation: Patient was seen for COPD exacerbation. She was given oxygen in the hospital and nebulized treatments. Per the granddaughter, patient's oxygen levels been in the 80s when she walks around at home. They have no home oxygen. They believe she needs oxygen at home. Patient has not been taking her steroid inhaler. She has not been using nebulized medications since they did not turn in the nebulizer prescription when it was written years ago    Dementia: Granddaughter agrees that patient might benefit from Aricept. She has memory problems and that she does not always remember what is being done or told. Urinary and stool incontinence: Urine incontinence is because the patient cannot get to the bathroom quickly. She is now also having stool incontinence. Is not complete stool incontinence    Nicotine dependence: Patient did quit smoking a week ago. Granddaughter would like to have nicotine patches just in case patient wants to relapse    Alcohol abuse: Patient is now down to about 2 beers per day. Granddaughter is hoping to have her grandmother stop drinking as well. Documentation:  I communicated with the patient and/or health care decision maker about   Details of this discussion including any medical advice provided:     Diagnosis Orders   1. Hospital discharge follow-up     2. Functional urinary incontinence  Incontinence Supply Disposable MISC   3. Incomplete defecation  Incontinence Supply Disposable MISC   4. COPD with acute exacerbation ([address]     5. Moderate COPD (chronic obstructive pulmonary disease) (HCC)  Pulse Oximetry, With Exercise   6.  Dementia associated with alcoholism without behavioral disturbance (HCC)  DISCONTINUED: donepezil (ARICEPT) 5 MG tablet   7. Cigarette nicotine dependence in remission  nicotine (NICODERM CQ) 7 MG/24HR   8. Alcohol abuse       Placed order for pulse oximetry. Hopefully we can get them the oxygen right away I am not sure why she did not go home with oxygen    We will start Aricept for dementia. We will recheck her in a month to see how they are doing. We will do this in the office    History of nicotine dependence. Its only been a week since she quit smoking. We will go ahead and give NicoDerm patches. See orders    I affirm this is a Patient Initiated Episode with a Patient who has not had a related appointment within my department in the past 7 days or scheduled within the next 24 hours.     Patient identification was verified at the start of the visit: Yes    Total Time: minutes: 11-20 minutes    Note: not billable if this call serves to triage the patient into an appointment for the relevant concern      Eliverto Sprinkle

## 2020-10-12 ENCOUNTER — TELEPHONE (OUTPATIENT)
Dept: FAMILY MEDICINE CLINIC | Age: 80
End: 2020-10-12

## 2020-10-12 NOTE — TELEPHONE ENCOUNTER
See prior note. Let granddaughter know. If she wants her grandmother to be tested she will need to come here or to [facility] later in the week. Otherwise, she won't be able to get the oxygen order.

## 2020-10-12 NOTE — TELEPHONE ENCOUNTER
Can they come now to walk around the office? If not, please ask [facility] if patient can do the test there with Lana's help later this week?

## 2020-10-12 NOTE — TELEPHONE ENCOUNTER
I called [facility] and they do not do the initial testing to qualify patient for home O2. Patient will need to have testing done at the hospital ([facility]) or she can come into the office to be tested. What would you like for her to do? Please advise.

## 2020-10-12 NOTE — TELEPHONE ENCOUNTER
I called Jasmyne Larson- patients granddaughter and she is afraid to bring patient to office to be tested for continuous O2. States that her O2 drops to 89% just from walking in from the porch. I called [facility] and they said that they do not go to the homes to do the testing and the best thing to do would be to have her go to the hospital for the testing. Please advise.

## 2020-10-14 ENCOUNTER — OFFICE VISIT (OUTPATIENT)
Dept: FAMILY MEDICINE CLINIC | Age: 80
End: 2020-10-14
Payer: COMMERCIAL

## 2020-10-14 VITALS — OXYGEN SATURATION: 85 %

## 2020-10-14 PROCEDURE — G8399 PT W/DXA RESULTS DOCUMENT: HCPCS | Performed by: NURSE PRACTITIONER

## 2020-10-14 PROCEDURE — 1123F ACP DISCUSS/DSCN MKR DOCD: CPT | Performed by: NURSE PRACTITIONER

## 2020-10-14 PROCEDURE — 1090F PRES/ABSN URINE INCON ASSESS: CPT | Performed by: NURSE PRACTITIONER

## 2020-10-14 PROCEDURE — G8926 SPIRO NO PERF OR DOC: HCPCS | Performed by: NURSE PRACTITIONER

## 2020-10-14 PROCEDURE — G8484 FLU IMMUNIZE NO ADMIN: HCPCS | Performed by: NURSE PRACTITIONER

## 2020-10-14 PROCEDURE — 3023F SPIROM DOC REV: CPT | Performed by: NURSE PRACTITIONER

## 2020-10-14 PROCEDURE — G8419 CALC BMI OUT NRM PARAM NOF/U: HCPCS | Performed by: NURSE PRACTITIONER

## 2020-10-14 PROCEDURE — 4004F PT TOBACCO SCREEN RCVD TLK: CPT | Performed by: NURSE PRACTITIONER

## 2020-10-14 PROCEDURE — G8428 CUR MEDS NOT DOCUMENT: HCPCS | Performed by: NURSE PRACTITIONER

## 2020-10-14 PROCEDURE — 1111F DSCHRG MED/CURRENT MED MERGE: CPT | Performed by: NURSE PRACTITIONER

## 2020-10-14 PROCEDURE — 99213 OFFICE O/P EST LOW 20 MIN: CPT | Performed by: NURSE PRACTITIONER

## 2020-10-14 PROCEDURE — 4040F PNEUMOC VAC/ADMIN/RCVD: CPT | Performed by: NURSE PRACTITIONER

## 2020-10-14 ASSESSMENT — ENCOUNTER SYMPTOMS
WHEEZING: 0
COUGH: 0
CHEST TIGHTNESS: 0
SHORTNESS OF BREATH: 1

## 2020-10-14 NOTE — TELEPHONE ENCOUNTER
Patient was in the office and qualifies for continuous O2. Orders and office note sent to [facility]. All questions answered and will call the office with any other concerns.

## 2020-10-14 NOTE — PROGRESS NOTES
[facility]  1940  78 y.o. SUBJECT ALISON:    Chief Complaint   Patient presents with    Shortness of Breath       HPI    Brandi Backers is an 78year old female who is in for a 6 minute walk to assess oxygen need. She presents walking from her granddaughter's car, parked in the handicap area, the walk is about 15 to 20 feet. Upon entering the lobby she was having trouble breathing, SaO2 - 85., on room air. After applying oxygen, 2 liters/nasal cannula her saturation came up to 95%.     Current Outpatient Medications on File Prior to Visit   Medication Sig Dispense Refill    donepezil (ARICEPT) 5 MG tablet Take 1 tablet by mouth nightly 30 tablet 0    nicotine (NICODERM CQ) 7 MG/24HR Place 1 patch onto the skin every 24 hours 30 patch 0    fluticasone propionate (FLOVENT) 250 MCG/BLIST AEPB inhaler Inhale 1 puff into the lungs daily 3 each 3    guaiFENesin (MUCINEX) 600 MG extended release tablet Take 1 tablet by mouth 2 times daily 14 tablet 0    ipratropium (ATROVENT HFA) 17 MCG/ACT inhaler Inhale 1 puff into the lungs 4 times daily 3 Inhaler 3    albuterol sulfate HFA (PROAIR HFA) 108 (90 Base) MCG/ACT inhaler Inhale 2 puffs into the lungs every 4 hours as needed for Wheezing or Shortness of Breath 3 Inhaler 3    folic acid (FOLVITE) 1 MG tablet Take 1 tablet by mouth daily 90 tablet 3    clopidogrel (PLAVIX) 75 MG tablet Take 1 tablet by mouth daily 90 tablet 1    atorvastatin (LIPITOR) 80 MG tablet Take 1 tablet by mouth daily 90 tablet 1    albuterol (PROVENTIL) (2.5 MG/3ML) 0.083% nebulizer solution Take 3 mLs by nebulization every 6 hours as needed for Wheezing or Shortness of Breath 360 mL 11    Nebulizers (MINI PLUS NEBULIZER) MISC Use 4 times per day as needed 1 each 0    vitamin B-1 (THIAMINE) 100 MG tablet Take 1 tablet by mouth daily 90 tablet 3    UNABLE TO FIND Shower Chair 1 each 0    Nutritional Supplements (ENSURE ACTIVE) LIQD 1 can 3 times per day 90 Bottle 11    Spacer/Aero Chamber Mouthpiece MISC 1 each by Does not apply route once as needed (SOB) 1 each 0    Multiple Vitamins-Minerals (THERAPEUTIC MULTIVITAMIN-MINERALS) tablet Take 1 tablet by mouth daily 90 tablet 3    UNABLE TO FIND Shingles vaccine 1 Units 0    calcium carbonate-vitamin D (CALCIUM 600 + D) 600-400 MG-UNIT TABS per tab Twice daily by mouth (Patient not taking: Reported on 10/8/2020) 60 tablet 11    aspirin 325 MG tablet Take 325 mg by mouth every morning. Over The Counter , Last Dose Taken 11-9-12 Due To Scheduled Procedure      OXYGEN by Nasal route nightly. Oxygen 2 liters per minute       No current facility-administered medications on file prior to visit.         Past Medical History:   Diagnosis Date    AAA (abdominal aortic aneurysm) (HCC)     Alcohol abuse     4 beers daily    Alcohol-induced persisting dementia (HCC)     Arthritis     Asthma     COPD (chronic obstructive pulmonary disease) (HCC)     History of breast cancer Dx 12-06    Lumpectomy Right Breast For Cancer, Had Radiation Treatments    Hx of blood clots     HX OTHER MEDICAL     Primary Care Physician Is Dr. Rell Johnson    Hyperlipidemia     high HDL    Hypertension     Iliac artery aneurysm, bilateral ([address]     On home oxygen therapy     At Night 2 Liters Per Nasal Cannula    Osteopenia     Peripheral vascular disease ([address] Dx in 5-2008    Presbyopia 12/10/2019     Past Surgical History:   Procedure Laterality Date    ABDOMEN SURGERY  1970's    \"Surgery For Adhesions\"    ABDOMINAL AORTIC ANEURYSM REPAIR, ENDOVASCULAR  11/13/2012    BREAST BIOPSY  5/2013    benign    BREAST LUMPECTOMY  12-06    Lumpectomy Right Breast For Cancer, Had Radiation  Treatments    COLONOSCOPY  2011, 8-12    Mukerjee    DENTAL SURGERY      Teeth Extracted In Past    FEMORAL BYPASS  4-09    \"Both Legs\"    FRACTURE SURGERY  Early 1980's    Broken Left Arm    HYSTERECTOMY, TOTAL ABDOMINAL  1980's     Family History   Problem Relation Age of Onset  Colon Cancer Father     Coronary Art Dis Sister     Heart Disease Sister     High Blood Pressure Sister     High Cholesterol Sister     Coronary Art Dis Brother     Heart Attack Brother          age 76    Heart Disease Brother     Early Death Brother     Other Sister         \"Both Legs Amputated\"    Kidney Disease Sister         \"Kidney Problems\"    Early Death Brother         Suicide- hung himself    Early Death Brother         Murdered in his 42's    Early Death Son 1         in a fire    Early Death Mother 61    Vision Loss Sister         \"Eye Problems\"   [facility] Other Daughter         \"Lung Problems\"     Social History     Socioeconomic History    Marital status:      Spouse name: Not on file    Number of children: Not on file    Years of education: Not on file    Highest education level: Not on file   Occupational History    Not on file   Social Needs    Financial resource strain: Not on file    Food insecurity     Worry: Not on file     Inability: Not on file   [employer] needs     Medical: Not on file     Non-medical: Not on file   Tobacco Use    Smoking status: Current Every Day Smoker     Packs/day: 1.00     Years: 61.00     Pack years: 61.00     Types: Cigarettes    Smokeless tobacco: Never Used   Substance and Sexual Activity    Alcohol use:  Yes     Alcohol/week: 0.0 standard drinks     Comment: \"Beer Every Day\"    Drug use: No    Sexual activity: Never   Lifestyle    Physical activity     Days per week: Not on file     Minutes per session: Not on file    Stress: Not on file   Relationships    Social connections     Talks on phone: Not on file     Gets together: Not on file     Attends [employer] service: Not on file     Active member of club or organization: Not on file     Attends meetings of clubs or organizations: Not on file     Relationship status: Not on file    Intimate partner violence     Fear of current or ex partner: Not on file     Emotionally 06/07/2018    WBC 4.6 05/30/2018    HGB 13.8 09/30/2020    HGB 14.2 06/07/2018    HGB 16.5 05/30/2018    HCT 40.9 09/30/2020    HCT 44.9 06/07/2018    HCT 49.4 05/30/2018    MCV 94.9 09/30/2020    .4 06/07/2018    MCV 94.6 05/30/2018     09/30/2020     06/07/2018     05/30/2018    SEGSABS 3.3 09/30/2020    SEGSABS 2.8 06/07/2018    SEGSABS 2.7 05/30/2018    LYMPHSABS 1.0 09/30/2020    MONOSABS 0.7 09/30/2020    EOSABS 0.1 09/30/2020    BASOSABS 0.0 09/30/2020     Lab Results   Component Value Date    TSHHS 0.392 08/13/2012     Lab Results   Component Value Date    LABALBU 3.9 09/30/2020    BILITOT 0.5 09/30/2020    AST 16 09/30/2020    ALT 8 09/30/2020    ALKPHOS 97 09/30/2020             No results found for this visit on 10/14/20. ASSESSMENT AND PLAN:     1. Shortness of breath    2. COPD with acute exacerbation [facility]    Clinic performed test completed, sent to pulmonology    No follow-ups on file. Care discussed with patient. Patient educated on signs and symptoms of exacerbation and when to seek further medical attention. Advised to call for any problems, questions, or concerns. Patient verbalizes understanding and agrees with plan. Medications reviewed and reconciled. Continue current medications. Appropriate prescriptions are ordered. Risks and benefits of meds are discussed. After visit summary provided.

## 2020-10-15 ENCOUNTER — TELEPHONE (OUTPATIENT)
Dept: FAMILY MEDICINE CLINIC | Age: 80
End: 2020-10-15

## 2020-10-15 NOTE — TELEPHONE ENCOUNTER
[address] called stating patient is switching to their pharmacy. They are request new scripts and also refills on all of patient medications. Please fax over new scripts to [phone].  Please advise

## 2020-10-19 RX ORDER — ATORVASTATIN CALCIUM 80 MG/1
80 TABLET, FILM COATED ORAL DAILY
Qty: 90 TABLET | Refills: 1 | OUTPATIENT
Start: 2020-10-19

## 2020-10-21 ENCOUNTER — TELEPHONE (OUTPATIENT)
Dept: FAMILY MEDICINE CLINIC | Age: 80
End: 2020-10-21

## 2020-10-21 RX ORDER — NALTREXONE HYDROCHLORIDE 50 MG/1
50 TABLET, FILM COATED ORAL DAILY
Qty: 90 TABLET | Refills: 1 | Status: SHIPPED | OUTPATIENT
Start: 2020-10-21 | End: 2021-04-13 | Stop reason: SDUPTHER

## 2020-10-21 RX ORDER — FOLIC ACID 1 MG/1
1 TABLET ORAL DAILY
Qty: 90 TABLET | Refills: 3 | Status: SHIPPED | OUTPATIENT
Start: 2020-10-21 | End: 2021-01-01 | Stop reason: SDUPTHER

## 2020-10-21 RX ORDER — DONEPEZIL HYDROCHLORIDE 5 MG/1
5 TABLET, FILM COATED ORAL NIGHTLY
Qty: 90 TABLET | Refills: 1 | Status: SHIPPED | OUTPATIENT
Start: 2020-10-21 | End: 2021-02-09 | Stop reason: DRUGHIGH

## 2020-10-21 RX ORDER — ATORVASTATIN CALCIUM 80 MG/1
80 TABLET, FILM COATED ORAL DAILY
Qty: 90 TABLET | Refills: 3 | Status: SHIPPED | OUTPATIENT
Start: 2020-10-21 | End: 2021-01-01 | Stop reason: SDUPTHER

## 2020-10-21 RX ORDER — ALBUTEROL SULFATE 90 UG/1
2 AEROSOL, METERED RESPIRATORY (INHALATION) EVERY 4 HOURS PRN
Qty: 3 INHALER | Refills: 3 | Status: SHIPPED | OUTPATIENT
Start: 2020-10-21 | End: 2021-01-01

## 2020-10-21 RX ORDER — ALBUTEROL SULFATE 2.5 MG/3ML
2.5 SOLUTION RESPIRATORY (INHALATION) EVERY 6 HOURS PRN
Qty: 360 ML | Refills: 3 | Status: SHIPPED | OUTPATIENT
Start: 2020-10-21 | End: 2021-02-16

## 2020-10-21 RX ORDER — CLOPIDOGREL BISULFATE 75 MG/1
75 TABLET ORAL DAILY
Qty: 90 TABLET | Refills: 3 | Status: SHIPPED | OUTPATIENT
Start: 2020-10-21 | End: 2021-01-01 | Stop reason: SDUPTHER

## 2020-10-21 NOTE — TELEPHONE ENCOUNTER
Ignore last note (patient on my schedule for today, but as a Medicare visit with Seymour Marti). Naltrexone (not Narcan) sent to pharmacy.

## 2020-10-28 ENCOUNTER — HOSPITAL ENCOUNTER (EMERGENCY)
Age: 80
Discharge: HOME | End: 2020-10-28
Attending: EMERGENCY MEDICINE
Payer: COMMERCIAL

## 2020-10-28 ENCOUNTER — APPOINTMENT (OUTPATIENT)
Dept: CT IMAGING | Age: 80
End: 2020-10-28
Payer: COMMERCIAL

## 2020-10-28 ENCOUNTER — APPOINTMENT (OUTPATIENT)
Dept: GENERAL RADIOLOGY | Age: 80
End: 2020-10-28
Payer: COMMERCIAL

## 2020-10-28 ENCOUNTER — VIRTUAL VISIT (OUTPATIENT)
Dept: FAMILY MEDICINE CLINIC | Age: 80
End: 2020-10-28
Payer: COMMERCIAL

## 2020-10-28 VITALS
HEART RATE: 67 BPM | BODY MASS INDEX: 14.75 KG/M2 | HEIGHT: 67 IN | WEIGHT: 94 LBS | DIASTOLIC BLOOD PRESSURE: 58 MMHG | SYSTOLIC BLOOD PRESSURE: 125 MMHG

## 2020-10-28 VITALS
TEMPERATURE: 97.4 F | WEIGHT: 90 LBS | DIASTOLIC BLOOD PRESSURE: 84 MMHG | HEIGHT: 67 IN | OXYGEN SATURATION: 92 % | HEART RATE: 61 BPM | SYSTOLIC BLOOD PRESSURE: 128 MMHG | RESPIRATION RATE: 14 BRPM | BODY MASS INDEX: 14.12 KG/M2

## 2020-10-28 LAB
ALBUMIN SERPL-MCNC: 3.8 GM/DL (ref 3.4–5)
ALP BLD-CCNC: 93 IU/L (ref 40–129)
ALT SERPL-CCNC: 10 U/L (ref 10–40)
ANION GAP SERPL CALCULATED.3IONS-SCNC: 9 MMOL/L (ref 4–16)
AST SERPL-CCNC: 17 IU/L (ref 15–37)
BASE EXCESS MIXED: 5.6 (ref 0–2.3)
BASOPHILS ABSOLUTE: 0 K/CU MM
BASOPHILS RELATIVE PERCENT: 0.6 % (ref 0–1)
BILIRUB SERPL-MCNC: 0.3 MG/DL (ref 0–1)
BUN BLDV-MCNC: 9 MG/DL (ref 6–23)
CALCIUM SERPL-MCNC: 9 MG/DL (ref 8.3–10.6)
CHLORIDE BLD-SCNC: 99 MMOL/L (ref 99–110)
CO2: 31 MMOL/L (ref 21–32)
COMMENT: ABNORMAL
CREAT SERPL-MCNC: 0.8 MG/DL (ref 0.6–1.1)
DIFFERENTIAL TYPE: ABNORMAL
EOSINOPHILS ABSOLUTE: 0.2 K/CU MM
EOSINOPHILS RELATIVE PERCENT: 5.6 % (ref 0–3)
GFR AFRICAN AMERICAN: >60 ML/MIN/1.73M2
GFR NON-AFRICAN AMERICAN: >60 ML/MIN/1.73M2
GLUCOSE BLD-MCNC: 117 MG/DL (ref 70–99)
GLUCOSE BLD-MCNC: 47 MG/DL (ref 70–99)
HCO3 VENOUS: 34.2 MMOL/L (ref 19–25)
HCT VFR BLD CALC: 41.3 % (ref 37–47)
HEMOGLOBIN: 13.5 GM/DL (ref 12.5–16)
IMMATURE NEUTROPHIL %: 0.3 % (ref 0–0.43)
LACTATE: 1.5 MMOL/L (ref 0.4–2)
LIPASE: 367 IU/L (ref 13–60)
LYMPHOCYTES ABSOLUTE: 1.2 K/CU MM
LYMPHOCYTES RELATIVE PERCENT: 34.6 % (ref 24–44)
MCH RBC QN AUTO: 31.8 PG (ref 27–31)
MCHC RBC AUTO-ENTMCNC: 32.7 % (ref 32–36)
MCV RBC AUTO: 97.4 FL (ref 78–100)
MONOCYTES ABSOLUTE: 0.7 K/CU MM
MONOCYTES RELATIVE PERCENT: 18.6 % (ref 0–4)
NUCLEATED RBC %: 0 %
O2 SAT, VEN: 67.9 % (ref 50–70)
PCO2, VEN: 68 MMHG (ref 38–52)
PDW BLD-RTO: 14.8 % (ref 11.7–14.9)
PH VENOUS: 7.31 (ref 7.32–7.42)
PLATELET # BLD: 243 K/CU MM (ref 140–440)
PMV BLD AUTO: 9.1 FL (ref 7.5–11.1)
PO2, VEN: 37 MMHG (ref 28–48)
POTASSIUM SERPL-SCNC: 4.9 MMOL/L (ref 3.5–5.1)
PRO-BNP: 185.6 PG/ML
RBC # BLD: 4.24 M/CU MM (ref 4.2–5.4)
SEGMENTED NEUTROPHILS ABSOLUTE COUNT: 1.4 K/CU MM
SEGMENTED NEUTROPHILS RELATIVE PERCENT: 40.3 % (ref 36–66)
SODIUM BLD-SCNC: 139 MMOL/L (ref 135–145)
TOTAL IMMATURE NEUTOROPHIL: 0.01 K/CU MM
TOTAL NUCLEATED RBC: 0 K/CU MM
TOTAL PROTEIN: 6.2 GM/DL (ref 6.4–8.2)
TROPONIN T: <0.01 NG/ML
WBC # BLD: 3.6 K/CU MM (ref 4–10.5)

## 2020-10-28 PROCEDURE — 82805 BLOOD GASES W/O2 SATURATION: CPT

## 2020-10-28 PROCEDURE — 85025 COMPLETE CBC W/AUTO DIFF WBC: CPT

## 2020-10-28 PROCEDURE — 83880 ASSAY OF NATRIURETIC PEPTIDE: CPT

## 2020-10-28 PROCEDURE — 6370000000 HC RX 637 (ALT 250 FOR IP): Performed by: EMERGENCY MEDICINE

## 2020-10-28 PROCEDURE — 83690 ASSAY OF LIPASE: CPT

## 2020-10-28 PROCEDURE — G0438 PPPS, INITIAL VISIT: HCPCS | Performed by: FAMILY MEDICINE

## 2020-10-28 PROCEDURE — 4040F PNEUMOC VAC/ADMIN/RCVD: CPT | Performed by: FAMILY MEDICINE

## 2020-10-28 PROCEDURE — 2580000003 HC RX 258: Performed by: EMERGENCY MEDICINE

## 2020-10-28 PROCEDURE — 96374 THER/PROPH/DIAG INJ IV PUSH: CPT

## 2020-10-28 PROCEDURE — 1123F ACP DISCUSS/DSCN MKR DOCD: CPT | Performed by: FAMILY MEDICINE

## 2020-10-28 PROCEDURE — 71045 X-RAY EXAM CHEST 1 VIEW: CPT

## 2020-10-28 PROCEDURE — 36415 COLL VENOUS BLD VENIPUNCTURE: CPT

## 2020-10-28 PROCEDURE — 6360000004 HC RX CONTRAST MEDICATION: Performed by: EMERGENCY MEDICINE

## 2020-10-28 PROCEDURE — 93005 ELECTROCARDIOGRAM TRACING: CPT | Performed by: EMERGENCY MEDICINE

## 2020-10-28 PROCEDURE — 84484 ASSAY OF TROPONIN QUANT: CPT

## 2020-10-28 PROCEDURE — 80053 COMPREHEN METABOLIC PANEL: CPT

## 2020-10-28 PROCEDURE — 99285 EMERGENCY DEPT VISIT HI MDM: CPT

## 2020-10-28 PROCEDURE — 83605 ASSAY OF LACTIC ACID: CPT

## 2020-10-28 PROCEDURE — 82962 GLUCOSE BLOOD TEST: CPT

## 2020-10-28 PROCEDURE — 74177 CT ABD & PELVIS W/CONTRAST: CPT

## 2020-10-28 RX ORDER — DEXTROSE MONOHYDRATE 25 G/50ML
25 INJECTION, SOLUTION INTRAVENOUS ONCE
Status: COMPLETED | OUTPATIENT
Start: 2020-10-28 | End: 2020-10-28

## 2020-10-28 RX ORDER — FAMOTIDINE 20 MG/1
20 TABLET, FILM COATED ORAL 2 TIMES DAILY
Qty: 60 TABLET | Refills: 0 | Status: SHIPPED | OUTPATIENT
Start: 2020-10-28 | End: 2020-11-09

## 2020-10-28 RX ORDER — LIDOCAINE HYDROCHLORIDE 20 MG/ML
15 SOLUTION OROPHARYNGEAL ONCE
Status: COMPLETED | OUTPATIENT
Start: 2020-10-28 | End: 2020-10-28

## 2020-10-28 RX ORDER — MAGNESIUM HYDROXIDE/ALUMINUM HYDROXICE/SIMETHICONE 120; 1200; 1200 MG/30ML; MG/30ML; MG/30ML
30 SUSPENSION ORAL ONCE
Status: COMPLETED | OUTPATIENT
Start: 2020-10-28 | End: 2020-10-28

## 2020-10-28 RX ADMIN — IOPAMIDOL 75 ML: 755 INJECTION, SOLUTION INTRAVENOUS at 20:02

## 2020-10-28 RX ADMIN — ALUMINUM HYDROXIDE, MAGNESIUM HYDROXIDE, AND SIMETHICONE 30 ML: 200; 200; 20 SUSPENSION ORAL at 19:11

## 2020-10-28 RX ADMIN — LIDOCAINE HYDROCHLORIDE 15 ML: 20 SOLUTION ORAL; TOPICAL at 19:11

## 2020-10-28 RX ADMIN — DEXTROSE MONOHYDRATE 25 G: 25 INJECTION, SOLUTION INTRAVENOUS at 18:39

## 2020-10-28 ASSESSMENT — ENCOUNTER SYMPTOMS
VOMITING: 0
SHORTNESS OF BREATH: 1
CONSTIPATION: 0
COUGH: 0
RHINORRHEA: 0
SORE THROAT: 0
NAUSEA: 0
WHEEZING: 0
DIARRHEA: 0
ABDOMINAL PAIN: 1
EYE REDNESS: 0
BACK PAIN: 0

## 2020-10-28 ASSESSMENT — PATIENT HEALTH QUESTIONNAIRE - PHQ9
SUM OF ALL RESPONSES TO PHQ QUESTIONS 1-9: 0
SUM OF ALL RESPONSES TO PHQ QUESTIONS 1-9: 0
SUM OF ALL RESPONSES TO PHQ9 QUESTIONS 1 & 2: 0
1. LITTLE INTEREST OR PLEASURE IN DOING THINGS: 0
2. FEELING DOWN, DEPRESSED OR HOPELESS: 0
SUM OF ALL RESPONSES TO PHQ QUESTIONS 1-9: 0

## 2020-10-28 ASSESSMENT — LIFESTYLE VARIABLES
HOW OFTEN DURING THE LAST YEAR HAVE YOU HAD A FEELING OF GUILT OR REMORSE AFTER DRINKING: 0
HOW MANY STANDARD DRINKS CONTAINING ALCOHOL DO YOU HAVE ON A TYPICAL DAY: 0
HOW OFTEN DURING THE LAST YEAR HAVE YOU BEEN UNABLE TO REMEMBER WHAT HAPPENED THE NIGHT BEFORE BECAUSE YOU HAD BEEN DRINKING: 0
HOW OFTEN DURING THE LAST YEAR HAVE YOU FOUND THAT YOU WERE NOT ABLE TO STOP DRINKING ONCE YOU HAD STARTED: 0
HOW OFTEN DO YOU HAVE SIX OR MORE DRINKS ON ONE OCCASION: 0
HAVE YOU OR SOMEONE ELSE BEEN INJURED AS A RESULT OF YOUR DRINKING: 0
HOW OFTEN DURING THE LAST YEAR HAVE YOU NEEDED AN ALCOHOLIC DRINK FIRST THING IN THE MORNING TO GET YOURSELF GOING AFTER A NIGHT OF HEAVY DRINKING: 0
HAS A RELATIVE, FRIEND, DOCTOR, OR ANOTHER HEALTH PROFESSIONAL EXPRESSED CONCERN ABOUT YOUR DRINKING OR SUGGESTED YOU CUT DOWN: 0
HOW OFTEN DO YOU HAVE A DRINK CONTAINING ALCOHOL: 4
HOW OFTEN DURING THE LAST YEAR HAVE YOU FAILED TO DO WHAT WAS NORMALLY EXPECTED FROM YOU BECAUSE OF DRINKING: 0
AUDIT TOTAL SCORE: 4
AUDIT-C TOTAL SCORE: 4

## 2020-10-28 NOTE — PROGRESS NOTES
Medicare Annual Wellness Visit  Name: Shon Organ Date: 10/28/2020   MRN: [id number] Sex: Female   Age: 78 y.o. Ethnicity: Non-/Non    : 1940 Race: Tona Reynoso is here for Medicare AWV    Screenings for behavioral, psychosocial and functional/safety risks, and cognitive dysfunction are all negative except as indicated below. These results, as well as other patient data from the [email or web address] form, are documented in Flowsheets linked to this Encounter. Allergies   Allergen Reactions    Pcn [Penicillins] Other (See Comments)     \"Been so long ago, that I forgot. \"    Thiazide-Type Diuretics      low sodium       Prior to Visit Medications    Medication Sig Taking?  Authorizing Provider   naltrexone (DEPADE) 50 MG tablet Take 1 tablet by mouth daily Yes James Elaine MD   donepezil (ARICEPT) 5 MG tablet Take 1 tablet by mouth nightly Yes James Elaine MD   fluticasone propionate (FLOVENT) 250 MCG/BLIST AEPB inhaler Inhale 1 puff into the lungs daily Yes James Elaine MD   ipratropium (ATROVENT HFA) 17 MCG/ACT inhaler Inhale 1 puff into the lungs 4 times daily Yes James Elaine MD   albuterol sulfate HFA (PROAIR HFA) 108 (90 Base) MCG/ACT inhaler Inhale 2 puffs into the lungs every 4 hours as needed for Wheezing or Shortness of Breath Yes James Elaine MD   folic acid (FOLVITE) 1 MG tablet Take 1 tablet by mouth daily Yes James Elaine MD   clopidogrel (PLAVIX) 75 MG tablet Take 1 tablet by mouth daily Yes James Elaine MD   atorvastatin (LIPITOR) 80 MG tablet Take 1 tablet by mouth daily Yes James Elaine MD   albuterol (PROVENTIL) (2.5 MG/3ML) 0.083% nebulizer solution Take 3 mLs by nebulization every 6 hours as needed for Wheezing or Shortness of Breath Yes James Elaine MD   Incontinence Supply Disposable MISC Change 4 times per day Yes James Elaine MD   nicotine (NICODERM CQ) 7 MG/24HR Place 1 patch onto the skin every 24 hours Yes James Elaine MD cognition reveals global memory impairment noted. Patient's complete Health Risk Assessment and screening values have been reviewed and are found in Flowsheets. The following problems were reviewed today and where indicated follow up appointments were made and/or referrals ordered. Positive Risk Factor Screenings with Interventions:     Cognitive: Words recalled: 0 Words Recalled  Total Score Interpretation: Positive Mini-Cog  Did the patient refuse to take the cognition test?: No  Cognitive Impairment Interventions:  · Patient does have diagnosis of dementia per PCP    General Health and ACP:  General  In general, how would you say your health is?: Good  In the past 7 days, have you experienced any of the following?  New or Increased Pain, New or Increased Fatigue, Loneliness, Social Isolation, Stress or Anger?: None of These  Do you get the social and emotional support that you need?: Yes  Do you have a Living Will?: (!) No  Advance Directives     Power of [facility] Will ACP-Advance Directive ACP-Power of     Not on File [place] on 05/17/16 Filed [address] Risk Interventions:  · No Living Will: Advance Care Planning addressed with patient today    Health Habits/Nutrition:  Health Habits/Nutrition  Do you exercise for at least 20 minutes 2-3 times per week?: (!) No  Have you lost any weight without trying in the past 3 months?: No  Do you eat fewer than 2 meals per day?: No  Have you seen a dentist within the past year?: (!) No  Body mass index: (!) 14.72  Health Habits/Nutrition Interventions:  · Inadequate physical activity:  patient is not ready to increase his/her physical activity level at this time  · Nutritional issues:  patient is not ready to address his/her nutritional/weight issues at this time  · Dental exam overdue:  patient declines dental evaluation as she does wear dentures    Hearing/Vision:  No exam data present  Hearing/Vision  Do you or your family notice any trouble with your hearing?: (!) Yes  Do you have difficulty driving, watching TV, or doing any of your daily activities because of your eyesight?: No  Have you had an eye exam within the past year?: Yes  Hearing/Vision Interventions:  · Hearing concerns:  patient declines any further evaluation/treatment for hearing issues    ADL:  ADLs  In the past 7 days, did you need help from others to perform any of the following everyday activities? Eating, dressing, grooming, bathing, toileting, or walking/balance?: (!) Bathing  In the past 7 days, did you need help from others to take care of any of the following?  Laundry, housekeeping, banking/finances, shopping, telephone use, food preparation, transportation, or taking medications?: (!) Food Preparation, Laundry, Housekeeping, Transportation, Shopping, Taking Medications, Banking/Finances  ADL Interventions:  · Patient declines any further evaluation/treatment for this issue   · Patient lives with her granddaughter who takes care of ADLs    Personalized Preventive Plan   Current Health Maintenance Status  Immunization History   Administered Date(s) Administered    Influenza A (D1S1-76) Vaccine IM 12/16/2009    Influenza Virus Vaccine 11/19/2007, 10/27/2008, 11/03/2010, 09/28/2011    Influenza, High Dose (Fluzone 65 yrs and older) 10/10/2012, 10/31/2013, 09/22/2014, 10/06/2015, 10/31/2016, 11/14/2017, 10/02/2018    Influenza, Triv, inactivated, subunit, adjuvanted, IM (Fluad 65 yrs and older) 10/08/2019    Pneumococcal Conjugate 13-valent ([id number]) 06/23/2016    Pneumococcal Polysaccharide ([id number]) 10/31/2006    Tdap (Boostrix, Adacel) 12/10/2008        Health Maintenance   Topic Date Due    Shingles Vaccine (1 of 2) 11/28/1988    Lipid screen  03/23/2016    DTaP/Tdap/Td vaccine (2 - Td) 12/10/2018    Annual Wellness Visit (AWV)  05/29/2019    Flu vaccine (1) 09/01/2020    DEXA (modify frequency per FRAX score)  Completed    Pneumococcal 65+ years Vaccine  Completed    Hepatitis A vaccine  Aged Out    Hepatitis B vaccine  Aged Out    Hib vaccine  Aged Out    Meningococcal (ACWY) vaccine  Aged Out     Recommendations for [employer] Due: see orders and patient instructions/AVS.    Unable to obtain 3 vital signs due to patient not having equipment to take temperature. Recommended screening schedule for the next 5-10 years is provided to the patient in written form: see Patient Instructions/AVS.    Santa Martinez is a 78 y.o. female being evaluated by a Virtual Visit (audio visit) encounter to address concerns as mentioned above. A caregiver was present when appropriate. Due to this being a TeleHealth encounter (During BBIXK-01 public health emergency), evaluation of the following organ systems was limited: Vitals/Constitutional/EENT/Resp/CV/GI//MS/Neuro/Skin/Heme-Lymph-Imm. Pursuant to the emergency declaration under the [address] [place] [address], [address] [place] [address] and the [employer] Act, this Virtual Visit was conducted with patient's (and/or legal guardian's) consent, to reduce the patient's risk of exposure to COVID-19 and provide necessary medical care. The patient (and/or legal guardian) has also been advised to contact this office for worsening conditions or problems, and seek emergency medical treatment and/or call 911 if deemed necessary. Patient identification was verified at the start of the visit: Yes    Total time spent for this encounter: Not billed by time    Services were provided through a audio synchronous discussion virtually to substitute for in-person clinic visit. Patient and provider were located at their individual homes. --Roberto Greco on 10/28/2020 at 10:24 AM    An electronic signature was used to authenticate this note.     Howard Turner, 10/28/2020, performed the documented evaluation under the direct supervision of the attending physician.

## 2020-10-28 NOTE — ED NOTES
Merritt Rice (granddaughter) [phone] called and stated she was with patient when she had a dizzy spell and sat down and she began gasping for air. Patient baseline is 80-88 percent on 2L/min by NC. [employer]social is patient's POA.      Carla Garduno RN  10/28/20 [id number]

## 2020-10-28 NOTE — ED NOTES
Bed: ED-28  Expected date:   Expected time:   Means of arrival:   Comments:  Traci Boothe  10/28/20 [id number]

## 2020-10-28 NOTE — ED PROVIDER NOTES
Triage Chief Complaint:   Shortness of Breath (COPD - on 2. L nasal canula; hx dementia)    [place]:  Carroll Martines is a 78 y.o. female that presents after she had an episode where she stood up out of her chair at home and felt lightheaded. She sat back down did not fall or pass out. She thought her blood pressure might have been low. She states she was short of breath at this time. She has been intermittently short of breath since her last hospitalization. The shortness of breath does not worsen. She denies any chest pain. No coughing. She is chronically on 2 L of oxygen via nasal cannula at home. Family got her oxygen level dropped today but she also does not always keep her oxygen in her nose. She denies any nausea or vomiting today no fevers. She has had pain in epigastric region of her abdomen for last couple of months which is now unchanged. She denies any known sick contacts. She has been complaining of pain around her sternum since her last visit to the hospital about a month ago. No lower extremity swelling. No known sick contacts. ROS:   Review of Systems   Constitutional: Negative for chills and fever. HENT: Negative for congestion, rhinorrhea and sore throat. Eyes: Negative for redness and visual disturbance. Respiratory: Positive for shortness of breath. Negative for cough and wheezing. Cardiovascular: Negative for chest pain and leg swelling. Gastrointestinal: Positive for abdominal pain ( epigastric ). Negative for constipation, diarrhea, nausea and vomiting. Genitourinary: Negative for dysuria and frequency. Musculoskeletal: Negative for arthralgias and back pain. Skin: Negative for rash and wound. Neurological: Positive for light-headedness (resolved). Negative for dizziness, syncope, weakness, numbness and headaches. Psychiatric/Behavioral: Positive for confusion (chronic, at baseline). Negative for hallucinations and suicidal ideas.        Past Medical History: Diagnosis Date    AAA (abdominal aortic aneurysm) (HCC)     Alcohol abuse     4 beers daily    Alcohol-induced persisting dementia (HCC)     Arthritis     Asthma     COPD (chronic obstructive pulmonary disease) (HCC)     History of breast cancer Dx     Lumpectomy Right Breast For Cancer, Had Radiation Treatments    Hx of blood clots     HX OTHER MEDICAL     Primary Care Physician Is Dr. Sanjeev Wharton    Hyperlipidemia     high HDL    Hypertension     Iliac artery aneurysm, bilateral ([address]     On home oxygen therapy     At Night 2 Liters Per Nasal Cannula    Osteopenia     Peripheral vascular disease (HCC) Dx in     Presbyopia 12/10/2019     Past Surgical History:   Procedure Laterality Date    ABDOMEN SURGERY      \"Surgery For Adhesions\"    ABDOMINAL AORTIC ANEURYSM REPAIR, ENDOVASCULAR  2012    BREAST BIOPSY  2013    benign    BREAST LUMPECTOMY      Lumpectomy Right Breast For Cancer, Had Radiation  Treatments    [place]     [facility] [place] DENTAL SURGERY      Teeth Extracted In Past    FEMORAL BYPASS      \"Both Legs\"    FRACTURE SURGERY  Early     Broken Left Arm    HYSTERECTOMY, TOTAL ABDOMINAL       Family History   Problem Relation Age of Onset    Colon Cancer Father     Coronary Art Dis Sister     Heart Disease Sister     High Blood Pressure Sister     High Cholesterol Sister     Coronary Art Dis Brother     Heart Attack Brother          age 76    Heart Disease Brother     Early Death Brother     Other Sister         \"Both Legs Amputated\"    Kidney Disease Sister         \"Kidney Problems\"    Early Death Brother         Suicide- hung himself    Early Death Brother         Murdered in his 42's    Early Death Son 1         in a fire    Early Death Mother 61    Vision Loss Sister         \"Eye Problems\"    Other Daughter         \"Lung Problems\"     Social History     Socioeconomic History    Marital status:      Spouse name: Not on file    Number of children: Not on file    Years of education: Not on file    Highest education level: Not on file   Occupational History    Not on file   Social Needs    Financial resource strain: Not on file    Food insecurity     Worry: Not on file     Inability: Not on file    Transportation needs     Medical: Not on file     Non-medical: Not on file   Tobacco Use    Smoking status: Former Smoker     Packs/day: 1.00     Years: 61.00     Pack years: 61.00     Types: Cigarettes     Last attempt to quit: 2020     Years since quittin.0    Smokeless tobacco: Never Used   Substance and Sexual Activity    Alcohol use: Yes     Alcohol/week: 0.0 standard drinks     Comment: \"Beer Every Day\"    Drug use: No    Sexual activity: Never   Lifestyle    Physical activity     Days per week: Not on file     Minutes per session: Not on file    Stress: Not on file   Relationships    Social connections     Talks on phone: Not on file     Gets together: Not on file     Attends [employer] service: Not on file     Active member of club or organization: Not on file     Attends meetings of clubs or organizations: Not on file     Relationship status: Not on file    Intimate partner violence     Fear of current or ex partner: Not on file     Emotionally abused: Not on file     Physically abused: Not on file     Forced sexual activity: Not on file   Other Topics Concern    Not on file   Social History Narrative    Not on file     No current facility-administered medications for this encounter.       Current Outpatient Medications   Medication Sig Dispense Refill    famotidine (PEPCID) 20 MG tablet Take 1 tablet by mouth 2 times daily 60 tablet 0    naltrexone (DEPADE) 50 MG tablet Take 1 tablet by mouth daily 90 tablet 1    donepezil (ARICEPT) 5 MG tablet Take 1 tablet by mouth nightly 90 tablet 1    fluticasone propionate (FLOVENT) 250 MCG/BLIST AEPB inhaler Inhale 1 puff into the lungs daily 3 each 3    ipratropium (ATROVENT HFA) 17 MCG/ACT inhaler Inhale 1 puff into the lungs 4 times daily 3 Inhaler 3    albuterol sulfate HFA (PROAIR HFA) 108 (90 Base) MCG/ACT inhaler Inhale 2 puffs into the lungs every 4 hours as needed for Wheezing or Shortness of Breath 3 Inhaler 3    folic acid (FOLVITE) 1 MG tablet Take 1 tablet by mouth daily 90 tablet 3    clopidogrel (PLAVIX) 75 MG tablet Take 1 tablet by mouth daily 90 tablet 3    atorvastatin (LIPITOR) 80 MG tablet Take 1 tablet by mouth daily 90 tablet 3    albuterol (PROVENTIL) (2.5 MG/3ML) 0.083% nebulizer solution Take 3 mLs by nebulization every 6 hours as needed for Wheezing or Shortness of Breath 360 mL 3    Incontinence Supply Disposable MISC Change 4 times per day 120 each 11    nicotine (NICODERM CQ) 7 MG/24HR Place 1 patch onto the skin every 24 hours 30 patch 0    guaiFENesin (MUCINEX) 600 MG extended release tablet Take 1 tablet by mouth 2 times daily 14 tablet 0    Nebulizers (MINI PLUS NEBULIZER) MISC Use 4 times per day as needed 1 each 0    vitamin B-1 (THIAMINE) 100 MG tablet Take 1 tablet by mouth daily 90 tablet 3    UNABLE TO FIND Shower Chair 1 each 0    Nutritional Supplements (ENSURE ACTIVE) LIQD 1 can 3 times per day 90 Bottle 11    Spacer/Aero Chamber Mouthpiece MISC 1 each by Does not apply route once as needed (SOB) 1 each 0    Multiple Vitamins-Minerals (THERAPEUTIC MULTIVITAMIN-MINERALS) tablet Take 1 tablet by mouth daily 90 tablet 3    UNABLE TO FIND Shingles vaccine 1 Units 0    calcium carbonate-vitamin D (CALCIUM 600 + D) 600-400 MG-UNIT TABS per tab Twice daily by mouth (Patient not taking: Reported on 10/8/2020) 60 tablet 11    aspirin 325 MG tablet Take 325 mg by mouth every morning. Over The Counter , Last Dose Taken 11-9-12 Due To Scheduled Procedure      OXYGEN by Nasal route nightly.  Oxygen 2 liters per minute       Allergies   Allergen Reactions    Pcn [Penicillins] Other (See Comments)     \"Been so long ago, that I forgot. \"    Thiazide-Type Diuretics      low sodium       Nursing Notes Reviewed     Physical Exam:   ED Triage Vitals [10/28/20 1633]   Enc Vitals Group      /70      Pulse 68      Resp 18      Temp       Temp src       SpO2 97 %      Weight 90 lb (40.8 kg)      Height 5' 7\" (1.702 m)      Head Circumference       Peak Flow       Pain Score       Pain Loc       Pain Edu? Excl. in [address]? /84   Pulse 61   Temp 97.4 °F (36.3 °C) (Axillary)   Resp 14   Ht 5' 7\" (1.702 m)   Wt 90 lb (40.8 kg)   LMP  (LMP Unknown)   SpO2 92%   BMI 14.10 kg/m²   My pulse ox interpretation is - normal  Physical Exam  Vitals signs and nursing note reviewed. Constitutional:       General: She is not in acute distress. Appearance: Normal appearance. She is not toxic-appearing or diaphoretic. HENT:      Head: Normocephalic and atraumatic. Eyes:      General:         Right eye: No discharge. Left eye: No discharge. Conjunctiva/sclera: Conjunctivae normal.   Cardiovascular:      Rate and Rhythm: Normal rate and regular rhythm. Pulses: Normal pulses. Radial pulses are 2+ on the right side and 2+ on the left side. Pulmonary:      Effort: Pulmonary effort is normal. No respiratory distress. Breath sounds: No wheezing or rales. Abdominal:      General: There is no distension. Tenderness: There is no abdominal tenderness. Musculoskeletal: Normal range of motion. General: No swelling or tenderness. Skin:     General: Skin is warm and dry. Neurological:      General: No focal deficit present. Mental Status: She is alert. Cranial Nerves: No cranial nerve deficit.    Psychiatric:         Mood and Affect: Mood normal.         Behavior: Behavior normal.         I have reviewed and interpreted all of the currently available lab results from this visit (if applicable):  Results for orders placed or performed during the hospital encounter of 10/28/20   CBC Auto Differential   Result Value Ref Range    WBC 3.6 (L) 4.0 - 10.5 K/CU MM    RBC 4.24 4.2 - 5.4 M/CU MM    Hemoglobin 13.5 12.5 - 16.0 GM/DL    Hematocrit 41.3 37 - 47 %    MCV 97.4 78 - 100 FL    MCH 31.8 (H) 27 - 31 PG    MCHC 32.7 32.0 - 36.0 %    RDW 14.8 11.7 - 14.9 %    Platelets 052 597 - 720 K/CU MM    MPV 9.1 7.5 - 11.1 FL    Differential Type AUTOMATED DIFFERENTIAL     Segs Relative 40.3 36 - 66 %    Lymphocytes % 34.6 24 - 44 %    Monocytes % 18.6 (H) 0 - 4 %    Eosinophils % 5.6 (H) 0 - 3 %    Basophils % 0.6 0 - 1 %    Segs Absolute 1.4 K/CU MM    Lymphocytes Absolute 1.2 K/CU MM    Monocytes Absolute 0.7 K/CU MM    Eosinophils Absolute 0.2 K/CU MM    Basophils Absolute 0.0 K/CU MM    Nucleated RBC % 0.0 %    Total Nucleated RBC 0.0 K/CU MM    Total Immature Neutrophil 0.01 K/CU MM    Immature Neutrophil % 0.3 0 - 0.43 %   Comprehensive Metabolic Panel w/ Reflex to MG   Result Value Ref Range    Sodium 139 135 - 145 MMOL/L    Potassium 4.9 3.5 - 5.1 MMOL/L    Chloride 99 99 - 110 mMol/L    CO2 31 21 - 32 MMOL/L    BUN 9 6 - 23 MG/DL    CREATININE 0.8 0.6 - 1.1 MG/DL    Glucose 47 (LL) 70 - 99 MG/DL    Calcium 9.0 8.3 - 10.6 MG/DL    Alb 3.8 3.4 - 5.0 GM/DL    Total Protein 6.2 (L) 6.4 - 8.2 GM/DL    Total Bilirubin 0.3 0.0 - 1.0 MG/DL    ALT 10 10 - 40 U/L    AST 17 15 - 37 IU/L    Alkaline Phosphatase 93 40 - 129 IU/L    GFR Non-African American >60 >60 mL/min/1.73m2    GFR African American >60 >60 mL/min/1.73m2    Anion Gap 9 4 - 16   Troponin   Result Value Ref Range    Troponin T <0.010 <0.01 NG/ML   Brain Natriuretic Peptide   Result Value Ref Range    Pro-.6 <300 PG/ML   Lactic Acid, Plasma   Result Value Ref Range    Lactate 1.5 0.4 - 2.0 mMOL/L   Blood Gas, Venous   Result Value Ref Range    pH, [place] 7.31 (L) 7.32 - 7.42    pCO2, [place] 68 (H) 38 - 52 mmHG    pO2, [place] 37 28 - 48 mmHG    Base Exc, Mixed 5.6 (H) 0 - 2.3    HCO3, Venous 34.2 (H) 19 - 25 MMOL/L    O2 Sat, [place] 67.9 50 - 70 %    Comment VBG    Lipase   Result Value Ref Range    Lipase 367 (H) 13 - 60 IU/L   POCT Glucose   Result Value Ref Range    POC Glucose 117 (H) 70 - 99 MG/DL      Radiographs (if obtained):  [] The following radiograph was interpreted by myself in the absence of a radiologist:  [x]Radiologist's Report Reviewed:  CT ABDOMEN PELVIS W IV CONTRAST Additional Contrast? None   Final Result   1. Pancreas appears grossly unremarkable within limits of CT. 2. No acute intra-process identified. 3. Patent aorto bi-iliac bypass grafting. Patent stents also extend into the   proximal bilateral external iliac arteries. 4. Severe atherosclerotic disease. XR CHEST PORTABLE   Final Result   1. No acute cardiopulmonary process identified. EKG (if obtained): (All EKG's are interpreted by myself in the absence of a cardiologist)  Sinus rhythm with occasional PACs. Rate of 82. NH interval 172, QRS 66, QTc 425. No ST elevations or depressions. Normal T waves. Impression: Occasional PACs, otherwise normal EKG. When compared to previous EKG from 9/30/2020, the previously noted prolonged QT is resolved and the abnormal T waves are resolved. MDM:  Differential diagnoses considered include but are not limited to electrolyte abnormality, urinary tract infection, pancreatitis, pneumonia, pneumothorax, bronchitis, deconditioning. Basic labs were obtained and show a low glucose level. Patient has not been eating as much recently due to her epigastric discomfort. She was given glucose in the emergency department with improvement in her glucose level. Labs are otherwise unremarkable. EKG is not concerning for acute ischemia. Troponin is negative, I do not suspect acute coronary syndrome. Lactic acid level is normal.  Lipase is slightly elevated. CT scan of patient's abdomen shows no acute intra-abdominal abnormalities.   She may have a mild chronic pancreatitis but nothing acute. She feels much better after getting Maalox and lidocaine, I suspect her epigastric symptoms are more due to gastritis than pancreatitis. Patient is maintained normal action saturations throughout her stay in the emergency department. Chest x-ray is unremarkable. I do not suspect any pulmonary abnormality that caused patient episode of lightheadedness or shortness of breath. As a shortness of breath has been occurring since last hospitalization I suspect that she has some deconditioning. Did have case management see patient and they will arrange for outpatient PT OT evaluation. We will discharge patient home in stable condition. Recommended close follow-up with her primary care physician and cardiologist.    Plan of care explained to patient. Concerning signs and symptoms warranting a return visit to the Emergency Department were explained in detail. All questions and concerns were addressed to the patient's satisfaction. Patient understood and agreed with plan. I did don appropriate PPE (including N95 face mask, protective eye ware/safety glasses, gloves, hair covering, and no isolation gown), as recommended by the health facility/national standard best practice, during my bedside interactions with the patient. The likelihood of other entities in the differential is insufficient to justify any further testing for them. This was explained to the patient. The patient was advised that persistent or worsening symptoms would requirefurther evaluation. Clinical Impression:  1. Dyspnea, unspecified type    2. Acute gastritis, presence of bleeding unspecified, unspecified gastritis type    3. Itzel Kenyon MD       Please note that portions of this note may have been complete with a voice recognition program.  Effortswere made to edit the dictations, but occasional words are mis-transcribed.           Sam Fernandez MD  10/29/20 [id number]

## 2020-10-29 NOTE — ED NOTES
Discharge instructions reviewed with patient and family member. Both verbalized understanding of instructions. All questions answered. Pt taken to family's car by wheelchair.      Ericka Carl RN  10/28/20 [id number]

## 2020-10-29 NOTE — CARE COORDINATION
CM received a consult for patient for home OT/PT. CM went to visit patient. Patients' grandson/POA was in room. CM introduced self and explained role of CM. Grandson/POA explained that patient was unable to hear CM. CM explained that emergency room physician was recommending that patient receive home health care agency to provide OT/PT at home. CM asked grandson/POA who they would like to have as a provider. Son stated that they would like to utilize [facility]. CM called [address] [place] [address] Rd @ [phone] and called in referral and then faxed in all required information to [phone]. CM shared information with Dr. Doreen Andrade.

## 2020-11-02 RX ORDER — DONEPEZIL HYDROCHLORIDE 5 MG/1
TABLET, FILM COATED ORAL
Qty: 30 TABLET | OUTPATIENT
Start: 2020-11-02

## 2020-11-03 ENCOUNTER — TELEPHONE (OUTPATIENT)
Dept: FAMILY MEDICINE CLINIC | Age: 80
End: 2020-11-03

## 2020-11-03 NOTE — TELEPHONE ENCOUNTER
Jens is a home care nurse called about patient stated that last week patient sugar dropped to an 45, does not know why her sugars dropped so low when she is not a diabetic. Patient was seen at the ER. Jens stated he would like to continue his nurse visits for a few more weeks to make sure the patient is getting the education on her medication and getting the right nutritions that she is needs.  Please advise

## 2020-11-03 NOTE — TELEPHONE ENCOUNTER
It's likely low due to poor nutrition. I don't need her sugars to be monitored. Please check with granddaughter first to see if wants them to come out to monitor her medication and nutrition still.

## 2020-11-09 ENCOUNTER — OFFICE VISIT (OUTPATIENT)
Dept: FAMILY MEDICINE CLINIC | Age: 80
End: 2020-11-09
Payer: COMMERCIAL

## 2020-11-09 VITALS
WEIGHT: 98.8 LBS | HEART RATE: 62 BPM | OXYGEN SATURATION: 95 % | SYSTOLIC BLOOD PRESSURE: 110 MMHG | HEIGHT: 64 IN | BODY MASS INDEX: 16.87 KG/M2 | DIASTOLIC BLOOD PRESSURE: 76 MMHG

## 2020-11-09 PROCEDURE — G0008 ADMIN INFLUENZA VIRUS VAC: HCPCS | Performed by: FAMILY MEDICINE

## 2020-11-09 PROCEDURE — G8484 FLU IMMUNIZE NO ADMIN: HCPCS | Performed by: FAMILY MEDICINE

## 2020-11-09 PROCEDURE — 1123F ACP DISCUSS/DSCN MKR DOCD: CPT | Performed by: FAMILY MEDICINE

## 2020-11-09 PROCEDURE — G8427 DOCREV CUR MEDS BY ELIG CLIN: HCPCS | Performed by: FAMILY MEDICINE

## 2020-11-09 PROCEDURE — G8399 PT W/DXA RESULTS DOCUMENT: HCPCS | Performed by: FAMILY MEDICINE

## 2020-11-09 PROCEDURE — 90694 VACC AIIV4 NO PRSRV 0.5ML IM: CPT | Performed by: FAMILY MEDICINE

## 2020-11-09 PROCEDURE — 99214 OFFICE O/P EST MOD 30 MIN: CPT | Performed by: FAMILY MEDICINE

## 2020-11-09 PROCEDURE — G8419 CALC BMI OUT NRM PARAM NOF/U: HCPCS | Performed by: FAMILY MEDICINE

## 2020-11-09 PROCEDURE — 3023F SPIROM DOC REV: CPT | Performed by: FAMILY MEDICINE

## 2020-11-09 PROCEDURE — 4040F PNEUMOC VAC/ADMIN/RCVD: CPT | Performed by: FAMILY MEDICINE

## 2020-11-09 PROCEDURE — 1036F TOBACCO NON-USER: CPT | Performed by: FAMILY MEDICINE

## 2020-11-09 PROCEDURE — G8926 SPIRO NO PERF OR DOC: HCPCS | Performed by: FAMILY MEDICINE

## 2020-11-09 PROCEDURE — 1090F PRES/ABSN URINE INCON ASSESS: CPT | Performed by: FAMILY MEDICINE

## 2020-11-09 RX ORDER — M-VIT,TX,IRON,MINS/CALC/FOLIC 27MG-0.4MG
1 TABLET ORAL DAILY
Qty: 90 TABLET | Refills: 3 | Status: SHIPPED | OUTPATIENT
Start: 2020-11-09 | End: 2021-01-01 | Stop reason: SDUPTHER

## 2020-11-09 ASSESSMENT — ENCOUNTER SYMPTOMS
COUGH: 0
DIARRHEA: 0
NAUSEA: 0
EYE REDNESS: 0
VOMITING: 0
SHORTNESS OF BREATH: 0
SORE THROAT: 0

## 2020-11-09 NOTE — PROGRESS NOTES
Patient ID: Cassi Barker 1940    . Chief Complaint   Patient presents with    Dementia    COPD    Alcohol Problem         HPI     Dementia: history is per caregiver. She has memory problems and that she does not always remember what is being done or told. Taking the Aricept. Nothing has changed much.     Urinary and stool incontinence: Urine incontinence is because the patient cannot get to the bathroom quickly. She is now also having stool incontinence. Is not complete stool incontinence     Nicotine dependence: Patient did quit smoking a few weeks ago. Granddaughter would like to keep her on the patches for now.     Alcohol abuse: Patient is now down to about 3 beers per day. Granddaughter is hoping to have her grandmother stop drinking as well. She would like for her grandmother to continue taking the Naltrexone. Malnutrition: granddaughter is trying to feed her more and patient is doing well with eating. Some bloody noses since having oxygen at home. Caregiver applying vaseline to nose    Review of Systems   Constitutional: Negative for chills, fatigue and fever. HENT: Negative for sore throat. No loss of taste or smell sensation   Eyes: Negative for redness. Respiratory: Negative for cough (no unusual) and shortness of breath (no unusual). Gastrointestinal: Negative for diarrhea, nausea and vomiting. Musculoskeletal: Negative for arthralgias and myalgias. Skin: Negative for rash. Neurological: Negative for weakness and headaches. Hematological: Does not bruise/bleed easily.        Patient Active Problem List   Diagnosis    Peripheral vascular disease ([address]    Hyperlipidemia    Alcohol abuse    Moderate COPD (chronic obstructive pulmonary disease) ([address]    Osteoporosis    Tobacco abuse    History of breast cancer    Cachectic ([address]    Bilateral hearing loss    Functional urinary incontinence    Unsteady gait    COPD with acute exacerbation ([address]    Chest pain       Past Surgical History:   Procedure Laterality Date    ABDOMEN SURGERY      \"Surgery For Adhesions\"    ABDOMINAL AORTIC ANEURYSM REPAIR, ENDOVASCULAR  2012    BREAST BIOPSY  2013    benign    BREAST LUMPECTOMY      Lumpectomy Right Breast For Cancer, Had Radiation  Treatments    COLONOSCOPY  ,     Mushanice    DENTAL SURGERY      Teeth Extracted In Past    FEMORAL BYPASS      \"Both Legs\"    FRACTURE SURGERY  Early     Broken Left Arm    HYSTERECTOMY, TOTAL ABDOMINAL         Family History   Problem Relation Age of Onset    Colon Cancer Father     Coronary Art Dis Sister     Heart Disease Sister     High Blood Pressure Sister     High Cholesterol Sister     Coronary Art Dis Brother     Heart Attack Brother          age 76    Heart Disease Brother     Early Death Brother     Other Sister         \"Both Legs Amputated\"    Kidney Disease Sister         \"Kidney Problems\"    Early Death Brother         Suicide- hung himself    Early Death Brother         Murdered in his 42's    Early Death Son 1         in a fire    Early Death Mother 61    Vision Loss Sister         \"Eye Problems\"    Other Daughter         \"Lung Problems\"       Current Outpatient Medications on File Prior to Visit   Medication Sig Dispense Refill    naltrexone (DEPADE) 50 MG tablet Take 1 tablet by mouth daily 90 tablet 1    donepezil (ARICEPT) 5 MG tablet Take 1 tablet by mouth nightly 90 tablet 1    fluticasone propionate (FLOVENT) 250 MCG/BLIST AEPB inhaler Inhale 1 puff into the lungs daily 3 each 3    ipratropium (ATROVENT HFA) 17 MCG/ACT inhaler Inhale 1 puff into the lungs 4 times daily 3 Inhaler 3    albuterol sulfate HFA (PROAIR HFA) 108 (90 Base) MCG/ACT inhaler Inhale 2 puffs into the lungs every 4 hours as needed for Wheezing or Shortness of Breath 3 Inhaler 3    folic acid (FOLVITE) 1 MG tablet Take 1 tablet by mouth daily 90 tablet 3    clopidogrel (PLAVIX) 75 MG tablet Take 1 tablet by mouth daily 90 tablet 3    atorvastatin (LIPITOR) 80 MG tablet Take 1 tablet by mouth daily 90 tablet 3    albuterol (PROVENTIL) (2.5 MG/3ML) 0.083% nebulizer solution Take 3 mLs by nebulization every 6 hours as needed for Wheezing or Shortness of Breath 360 mL 3    Incontinence Supply Disposable MISC Change 4 times per day 120 each 11    Nebulizers (MINI PLUS NEBULIZER) MISC Use 4 times per day as needed 1 each 0    vitamin B-1 (THIAMINE) 100 MG tablet Take 1 tablet by mouth daily 90 tablet 3    Spacer/Aero Chamber Mouthpiece MISC 1 each by Does not apply route once as needed (SOB) 1 each 0    aspirin 325 MG tablet Take 325 mg by mouth every morning. Over The Counter , Last Dose Taken 11-9-12 Due To Scheduled Procedure      OXYGEN by Nasal route nightly. Oxygen 2 liters per minute      guaiFENesin (MUCINEX) 600 MG extended release tablet Take 1 tablet by mouth 2 times daily (Patient not taking: Reported on 11/9/2020) 14 tablet 0    UNABLE TO FIND Shower Chair (Patient not taking: Reported on 11/9/2020) 1 each 0    UNABLE TO FIND Shingles vaccine (Patient not taking: Reported on 11/9/2020) 1 Units 0    calcium carbonate-vitamin D (CALCIUM 600 + D) 600-400 MG-UNIT TABS per tab Twice daily by mouth (Patient not taking: Reported on 10/8/2020) 60 tablet 11     No current facility-administered medications on file prior to visit. Objective:         Physical Exam  Vitals signs and nursing note reviewed. Constitutional:       Appearance: She is underweight. HENT:      Head: Normocephalic and atraumatic. Neck:      Musculoskeletal: Neck supple. Cardiovascular:      Rate and Rhythm: Normal rate and regular rhythm. Heart sounds: Normal heart sounds, S1 normal and S2 normal.   Pulmonary:      Effort: Pulmonary effort is normal. No respiratory distress. Breath sounds: Decreased air movement present. No wheezing.    Abdominal:      Tenderness: There is abdominal tenderness in the periumbilical area. Musculoskeletal:      Right lower leg: No edema. Left lower leg: No edema. Skin:     General: Skin is warm and dry. Neurological:      Mental Status: Mental status is at baseline. She is confused. Vitals:    11/09/20 0958   BP: 110/76   Pulse: 62   SpO2: 95%   Weight: 98 lb 12.8 oz (44.8 kg)   Height: 5' 4\" (1.626 m)     Body mass index is 16.96 kg/m². Wt Readings from Last 3 Encounters:   11/09/20 98 lb 12.8 oz (44.8 kg)   10/28/20 90 lb (40.8 kg)   10/28/20 94 lb (42.6 kg)     BP Readings from Last 3 Encounters:   11/09/20 110/76   10/28/20 128/84   10/28/20 (!) 125/58          No results found for this visit on 11/09/20. The ASCVD Risk score (Jagdish Capmbell, et al., 2013) failed to calculate for the following reasons:     The 2013 ASCVD risk score is only valid for ages 36 to 78  Lab Review   Admission on 10/28/2020, Discharged on 10/28/2020   Component Date Value    Ventricular Rate 10/28/2020 82     Atrial Rate 10/28/2020 82     P-R Interval 10/28/2020 172     QRS Duration 10/28/2020 66     Q-T Interval 10/28/2020 364     QTc Calculation (Bazett) 10/28/2020 425     P Axis 10/28/2020 88     R Axis 10/28/2020 60     T Axis 10/28/2020 50     Diagnosis 10/28/2020                      Value:Sinus rhythm with premature atrial complexes  Otherwise normal ECG  When compared with ECG of 01-OCT-2020 07:05,  premature atrial complexes are now present  T wave inversion no longer evident in Inferior leads  T wave inversion now evident in Anterior leads      WBC 10/28/2020 3.6*    RBC 10/28/2020 4.24     Hemoglobin 10/28/2020 13.5     Hematocrit 10/28/2020 41.3     MCV 10/28/2020 97.4     MCH 10/28/2020 31.8*    MCHC 10/28/2020 32.7     RDW 10/28/2020 14.8     Platelets 53/39/2882 243     MPV 10/28/2020 9.1     Differential Type 10/28/2020 AUTOMATED DIFFERENTIAL     Segs Relative 10/28/2020 40.3     Lymphocytes % 10/28/2020 34.6  Monocytes % 10/28/2020 18.6*    Eosinophils % 10/28/2020 5.6*    Basophils % 10/28/2020 0.6     Segs Absolute 10/28/2020 1.4     Lymphocytes Absolute 10/28/2020 1.2     Monocytes Absolute 10/28/2020 0.7     Eosinophils Absolute 10/28/2020 0.2     Basophils Absolute 10/28/2020 0.0     Nucleated RBC % 10/28/2020 0.0     Total Nucleated RBC 10/28/2020 0.0     Total Immature Neutrophil 10/28/2020 0.01     Immature Neutrophil % 10/28/2020 0.3     Sodium 10/28/2020 139     Potassium 10/28/2020 4.9     Chloride 10/28/2020 99     CO2 10/28/2020 31     BUN 10/28/2020 9     CREATININE 10/28/2020 0.8     Glucose 10/28/2020 47*    Calcium 10/28/2020 9.0     Alb 10/28/2020 3.8     Total Protein 10/28/2020 6.2*    Total Bilirubin 10/28/2020 0.3     ALT 10/28/2020 10     AST 10/28/2020 17     Alkaline Phosphatase 10/28/2020 93     GFR Non- 10/28/2020 >60     GFR  10/28/2020 >60     Anion Gap 10/28/2020 9     Troponin T 10/28/2020 <0.010     Pro-BNP 10/28/2020 185.6     Lactate 10/28/2020 1.5     pH, [place] 10/28/2020 7.31*    pCO2, [place] 10/28/2020 68*    pO2, [place] 10/28/2020 37     Base Exc, Mixed 10/28/2020 5.6*    HCO3, Venous 10/28/2020 34.2*    O2 Sat, [place] 10/28/2020 67.9     Comment 10/28/2020 VBG     Lipase 10/28/2020 367*    POC Glucose 10/28/2020 117*   Admission on 09/30/2020, Discharged on 10/05/2020   Component Date Value    Ventricular Rate 09/30/2020 82     Atrial Rate 09/30/2020 82     P-R Interval 09/30/2020 154     QRS Duration 09/30/2020 66     Q-T Interval 09/30/2020 410     QTc Calculation (Bazett) 09/30/2020 479     P Axis 09/30/2020 84     R Axis 09/30/2020 47     T Axis 09/30/2020 -77     Diagnosis 09/30/2020                      Value:Sinus rhythm with premature atrial complexes  ST & T wave abnormality, consider inferior ischemia  Prolonged QT  Abnormal ECG  No previous ECGs available  Confirmed by [facility] Magdiel SCOTT ([id number]) on 9/30/2020 1:09:01 PM      WBC 09/30/2020 5.0     RBC 09/30/2020 4.31     Hemoglobin 09/30/2020 13.8     Hematocrit 09/30/2020 40.9     MCV 09/30/2020 94.9     MCH 09/30/2020 32.0*    MCHC 09/30/2020 33.7     RDW 09/30/2020 15.2*    Platelets 61/16/1513 279     MPV 09/30/2020 8.5     Differential Type 09/30/2020 AUTOMATED DIFFERENTIAL     Segs Relative 09/30/2020 64.7     Lymphocytes % 09/30/2020 19.2*    Monocytes % 09/30/2020 13.7*    Eosinophils % 09/30/2020 1.2     Basophils % 09/30/2020 0.4     Segs Absolute 09/30/2020 3.3     Lymphocytes Absolute 09/30/2020 1.0     Monocytes Absolute 09/30/2020 0.7     Eosinophils Absolute 09/30/2020 0.1     Basophils Absolute 09/30/2020 0.0     Nucleated RBC % 09/30/2020 0.0     Total Nucleated RBC 09/30/2020 0.0     Total Immature Neutrophil 09/30/2020 0.04     Immature Neutrophil % 09/30/2020 0.8*    Sodium 09/30/2020 135     Potassium 09/30/2020 4.7     Chloride 09/30/2020 96*    CO2 09/30/2020 28     BUN 09/30/2020 12     CREATININE 09/30/2020 0.8     Glucose 09/30/2020 135*    Calcium 09/30/2020 9.0     Alb 09/30/2020 3.9     Total Protein 09/30/2020 6.5     Total Bilirubin 09/30/2020 0.5     ALT 09/30/2020 8*    AST 09/30/2020 16     Alkaline Phosphatase 09/30/2020 97     GFR Non- 09/30/2020 >60     GFR  09/30/2020 >60     Anion Gap 09/30/2020 11     Troponin T 09/30/2020 <0.010     Pro-BNP 09/30/2020 2,572*    Troponin T 09/30/2020 <0.010     Left Ventricular Ejectio* 09/30/2020 70     LVEF MODALITY 09/30/2020 ECHO     Left Ventricular Ejectio* 09/30/2020 60     LVEF MODALITY 09/30/2020 Nuclear     D-Dimer, Quant 09/30/2020 1955*    Magnesium 09/30/2020 1.6*    Ventricular Rate 10/01/2020 73     Atrial Rate 10/01/2020 73     P-R Interval 10/01/2020 162     QRS Duration 10/01/2020 86     Q-T Interval 10/01/2020 406     QTc Calculation (Bazett) 10/01/2020 447     P Axis 10/01/2020 81  R Axis 10/01/2020 45     T Axis 10/01/2020 -30     Diagnosis 10/01/2020                      Value:Normal sinus rhythm  Abnormal QRS-T angle, consider primary T wave abnormality  Abnormal ECG  When compared with ECG of 30-SEP-2020 03:06,  premature atrial complexes are no longer present  T wave inversion less evident in Inferior leads  T wave inversion no longer evident in Anterior leads  Confirmed by [facility] LANETTE SCOTT ([id number]) on 10/1/2020 1:42:45 PM      Sodium 10/01/2020 131*    Potassium 10/01/2020 4.1     Chloride 10/01/2020 94*    CO2 10/01/2020 24     Anion Gap 10/01/2020 13     BUN 10/01/2020 12     CREATININE 10/01/2020 0.6     Glucose 10/01/2020 211*    Calcium 10/01/2020 9.1     GFR Non- 10/01/2020 >60     GFR  10/01/2020 >60     Magnesium 10/01/2020 2.3     Magnesium 10/04/2020 1.8     Potassium 10/04/2020 4.5            Assessment:       Diagnosis Orders   1. Moderate COPD (chronic obstructive pulmonary disease) ([facility]     2. Need for influenza vaccination  INFLUENZA, QUADV, ADJUVANTED, 65 YRS =, IM, PF, PREFILL SYR, 0.5ML (FLUAD)   3. Dementia associated with alcoholism without behavioral disturbance ([facility]     4. Functional urinary incontinence     5. Incomplete defecation     6. Alcohol abuse  Multiple Vitamins-Minerals (THERAPEUTIC MULTIVITAMIN-MINERALS) tablet   7. Cigarette nicotine dependence in remission  nicotine (NICODERM CQ) 7 MG/24HR   8. Epistaxis     9. Periumbilical pain             Plan:      See orders    Re-start the multivitamin  Nasal saline spray/ vaseline to nose    She is declining any workup of her abdominal pain. Can probably stop drinking now without going through withdrawal.    Has gained weight since last time which is great. Encourage caregiver to get her a scale and check weight regularly      Return in about 3 months (around 2/9/2021), or dementia, malnutrition, for Doxy, COPD.

## 2020-11-18 ENCOUNTER — TELEPHONE (OUTPATIENT)
Dept: FAMILY MEDICINE CLINIC | Age: 80
End: 2020-11-18

## 2020-11-18 RX ORDER — CALCIUM POLYCARBOPHIL 625 MG
625 TABLET ORAL DAILY
Qty: 90 TABLET | Refills: 3 | Status: SHIPPED | OUTPATIENT
Start: 2020-11-18 | End: 2021-01-01

## 2020-11-18 NOTE — TELEPHONE ENCOUNTER
It can be part of the Alzheimer's process. I can send over a fiber pill which can help to make the stool more solid. Do they want me to do this?

## 2020-11-18 NOTE — TELEPHONE ENCOUNTER
Fer Carrillo from [facility] home care stated that patients caregiver is concerns about patient is having a soft bowel movement (incontinence) everyday between 3am and 7am. Patient goes back to sleep wakes up around 9:30-10:00am has normal regular bowel movements.  Patients caregiver asking if incontinence normal. Please call Ora Sandoval

## 2020-11-18 NOTE — TELEPHONE ENCOUNTER
Spoke to [employer].  She stated that she would like for you to send fiber pill to patients pharmacy

## 2020-11-24 LAB
EKG ATRIAL RATE: 82 BPM
EKG DIAGNOSIS: NORMAL
EKG P AXIS: 88 DEGREES
EKG P-R INTERVAL: 172 MS
EKG Q-T INTERVAL: 364 MS
EKG QRS DURATION: 66 MS
EKG QTC CALCULATION (BAZETT): 425 MS
EKG R AXIS: 60 DEGREES
EKG T AXIS: 50 DEGREES
EKG VENTRICULAR RATE: 82 BPM

## 2021-01-01 ENCOUNTER — TELEPHONE (OUTPATIENT)
Dept: FAMILY MEDICINE CLINIC | Age: 81
End: 2021-01-01

## 2021-01-01 ENCOUNTER — TELEPHONE (OUTPATIENT)
Dept: ONCOLOGY | Age: 81
End: 2021-01-01

## 2021-01-01 ENCOUNTER — HOSPITAL ENCOUNTER (EMERGENCY)
Age: 81
Discharge: HOME | End: 2021-08-16
Attending: EMERGENCY MEDICINE
Payer: COMMERCIAL

## 2021-01-01 ENCOUNTER — APPOINTMENT (OUTPATIENT)
Dept: ULTRASOUND IMAGING | Age: 81
End: 2021-01-01
Payer: COMMERCIAL

## 2021-01-01 ENCOUNTER — OFFICE VISIT (OUTPATIENT)
Dept: FAMILY MEDICINE CLINIC | Age: 81
End: 2021-01-01
Payer: COMMERCIAL

## 2021-01-01 ENCOUNTER — INITIAL CONSULT (OUTPATIENT)
Dept: ONCOLOGY | Age: 81
End: 2021-01-01
Payer: COMMERCIAL

## 2021-01-01 ENCOUNTER — HOSPITAL ENCOUNTER (OUTPATIENT)
Dept: LAB | Age: 81
Setting detail: SPECIMEN
Discharge: HOME | End: 2021-10-20
Payer: COMMERCIAL

## 2021-01-01 ENCOUNTER — HOSPITAL ENCOUNTER (OUTPATIENT)
Dept: PET IMAGING | Age: 81
Discharge: HOME | End: 2021-10-08
Payer: COMMERCIAL

## 2021-01-01 ENCOUNTER — HOSPITAL ENCOUNTER (OUTPATIENT)
Dept: INFUSION THERAPY | Age: 81
Discharge: HOME | End: 2021-11-03
Payer: COMMERCIAL

## 2021-01-01 ENCOUNTER — VIRTUAL VISIT (OUTPATIENT)
Dept: FAMILY MEDICINE CLINIC | Age: 81
End: 2021-01-01
Payer: COMMERCIAL

## 2021-01-01 ENCOUNTER — HOSPITAL ENCOUNTER (OUTPATIENT)
Dept: PULMONOLOGY | Age: 81
Discharge: HOME | End: 2021-10-25
Payer: COMMERCIAL

## 2021-01-01 ENCOUNTER — APPOINTMENT (OUTPATIENT)
Dept: GENERAL RADIOLOGY | Age: 81
End: 2021-01-01
Payer: COMMERCIAL

## 2021-01-01 ENCOUNTER — HOSPITAL ENCOUNTER (OUTPATIENT)
Age: 81
Setting detail: OBSERVATION
LOS: 1 days | Discharge: HOME | End: 2021-09-09
Attending: EMERGENCY MEDICINE | Admitting: INTERNAL MEDICINE
Payer: COMMERCIAL

## 2021-01-01 ENCOUNTER — APPOINTMENT (OUTPATIENT)
Dept: CT IMAGING | Age: 81
End: 2021-01-01
Payer: COMMERCIAL

## 2021-01-01 VITALS
HEART RATE: 65 BPM | RESPIRATION RATE: 16 BRPM | SYSTOLIC BLOOD PRESSURE: 126 MMHG | HEIGHT: 64 IN | TEMPERATURE: 96.2 F | DIASTOLIC BLOOD PRESSURE: 52 MMHG | WEIGHT: 102 LBS | OXYGEN SATURATION: 98 % | BODY MASS INDEX: 17.42 KG/M2

## 2021-01-01 VITALS
WEIGHT: 102 LBS | RESPIRATION RATE: 18 BRPM | HEART RATE: 80 BPM | HEIGHT: 64 IN | DIASTOLIC BLOOD PRESSURE: 75 MMHG | OXYGEN SATURATION: 99 % | TEMPERATURE: 98.8 F | BODY MASS INDEX: 17.42 KG/M2 | SYSTOLIC BLOOD PRESSURE: 130 MMHG

## 2021-01-01 VITALS — HEART RATE: 83 BPM | SYSTOLIC BLOOD PRESSURE: 118 MMHG | DIASTOLIC BLOOD PRESSURE: 70 MMHG | OXYGEN SATURATION: 98 %

## 2021-01-01 VITALS
OXYGEN SATURATION: 98 % | DIASTOLIC BLOOD PRESSURE: 76 MMHG | SYSTOLIC BLOOD PRESSURE: 122 MMHG | RESPIRATION RATE: 16 BRPM | HEIGHT: 63 IN | BODY MASS INDEX: 18.79 KG/M2 | TEMPERATURE: 98.6 F | HEART RATE: 65 BPM | WEIGHT: 106.04 LBS

## 2021-01-01 VITALS
SYSTOLIC BLOOD PRESSURE: 118 MMHG | TEMPERATURE: 97.2 F | HEART RATE: 60 BPM | BODY MASS INDEX: 16.79 KG/M2 | WEIGHT: 94.8 LBS | OXYGEN SATURATION: 95 % | DIASTOLIC BLOOD PRESSURE: 70 MMHG

## 2021-01-01 DIAGNOSIS — J44.9 MODERATE COPD (CHRONIC OBSTRUCTIVE PULMONARY DISEASE) (HCC): ICD-10-CM

## 2021-01-01 DIAGNOSIS — Z09 HOSPITAL DISCHARGE FOLLOW-UP: Primary | ICD-10-CM

## 2021-01-01 DIAGNOSIS — I73.9 PERIPHERAL VASCULAR DISEASE (HCC): ICD-10-CM

## 2021-01-01 DIAGNOSIS — R91.8 OTHER NONSPECIFIC ABNORMAL FINDING OF LUNG FIELD: ICD-10-CM

## 2021-01-01 DIAGNOSIS — Z85.3 HISTORY OF BREAST CANCER: ICD-10-CM

## 2021-01-01 DIAGNOSIS — F10.10 ALCOHOL ABUSE: ICD-10-CM

## 2021-01-01 DIAGNOSIS — Z72.0 TOBACCO ABUSE: ICD-10-CM

## 2021-01-01 DIAGNOSIS — Z23 NEED FOR COVID-19 VACCINE: ICD-10-CM

## 2021-01-01 DIAGNOSIS — R15.9 INCONTINENCE OF FECES, UNSPECIFIED FECAL INCONTINENCE TYPE: ICD-10-CM

## 2021-01-01 DIAGNOSIS — R39.81 FUNCTIONAL URINARY INCONTINENCE: ICD-10-CM

## 2021-01-01 DIAGNOSIS — R91.8 RIGHT LOWER LOBE LUNG MASS: Primary | ICD-10-CM

## 2021-01-01 DIAGNOSIS — R91.8 LUNG MASS: ICD-10-CM

## 2021-01-01 DIAGNOSIS — R63.4 WEIGHT LOSS: ICD-10-CM

## 2021-01-01 DIAGNOSIS — R06.00 DYSPNEA, UNSPECIFIED TYPE: Primary | ICD-10-CM

## 2021-01-01 DIAGNOSIS — I50.9 ACUTE ON CHRONIC CONGESTIVE HEART FAILURE, UNSPECIFIED HEART FAILURE TYPE (HCC): ICD-10-CM

## 2021-01-01 DIAGNOSIS — J44.1 COPD EXACERBATION (HCC): Primary | ICD-10-CM

## 2021-01-01 DIAGNOSIS — Z23 NEED FOR INFLUENZA VACCINATION: ICD-10-CM

## 2021-01-01 DIAGNOSIS — J44.9 MODERATE COPD (CHRONIC OBSTRUCTIVE PULMONARY DISEASE) (HCC): Primary | ICD-10-CM

## 2021-01-01 DIAGNOSIS — Z00.00 ROUTINE GENERAL MEDICAL EXAMINATION AT A HEALTH CARE FACILITY: Primary | ICD-10-CM

## 2021-01-01 DIAGNOSIS — R60.9 EDEMA, UNSPECIFIED TYPE: ICD-10-CM

## 2021-01-01 DIAGNOSIS — Z85.3 HISTORY OF BREAST CANCER: Primary | ICD-10-CM

## 2021-01-01 DIAGNOSIS — R94.2 ABNORMAL PET OF RIGHT LUNG: ICD-10-CM

## 2021-01-01 DIAGNOSIS — F10.27 DEMENTIA ASSOCIATED WITH ALCOHOLISM WITHOUT BEHAVIORAL DISTURBANCE (HCC): ICD-10-CM

## 2021-01-01 DIAGNOSIS — R91.8 MASS OF RIGHT LUNG: Primary | ICD-10-CM

## 2021-01-01 LAB
ALBUMIN SERPL-MCNC: 3.8 GM/DL (ref 3.4–5)
ALBUMIN SERPL-MCNC: 4.1 GM/DL (ref 3.4–5)
ALP BLD-CCNC: 79 IU/L (ref 40–128)
ALP BLD-CCNC: 96 IU/L (ref 40–129)
ALT SERPL-CCNC: 14 U/L (ref 10–40)
ALT SERPL-CCNC: 15 U/L (ref 10–40)
ANION GAP SERPL CALCULATED.3IONS-SCNC: 6 MMOL/L (ref 4–16)
ANION GAP SERPL CALCULATED.3IONS-SCNC: 7 MMOL/L (ref 4–16)
AST SERPL-CCNC: 23 IU/L (ref 15–37)
AST SERPL-CCNC: 26 IU/L (ref 15–37)
BASOPHILS ABSOLUTE: 0 K/CU MM
BASOPHILS ABSOLUTE: 0 K/CU MM
BASOPHILS RELATIVE PERCENT: 0.4 % (ref 0–1)
BASOPHILS RELATIVE PERCENT: 0.4 % (ref 0–1)
BILIRUB SERPL-MCNC: 0.2 MG/DL (ref 0–1)
BILIRUB SERPL-MCNC: 0.3 MG/DL (ref 0–1)
BUN BLDV-MCNC: 11 MG/DL (ref 6–23)
BUN BLDV-MCNC: 13 MG/DL (ref 6–23)
BUN BLDV-MCNC: 13 MG/DL (ref 6–23)
BUN BLDV-MCNC: 18 MG/DL (ref 6–23)
CALCIUM SERPL-MCNC: 8.9 MG/DL (ref 8.3–10.6)
CALCIUM SERPL-MCNC: 9 MG/DL (ref 8.3–10.6)
CALCIUM SERPL-MCNC: 9.1 MG/DL (ref 8.3–10.6)
CALCIUM SERPL-MCNC: 9.3 MG/DL (ref 8.3–10.6)
CHLORIDE BLD-SCNC: 101 MMOL/L (ref 99–110)
CHLORIDE BLD-SCNC: 102 MMOL/L (ref 99–110)
CHLORIDE BLD-SCNC: 104 MMOL/L (ref 99–110)
CHLORIDE BLD-SCNC: 98 MMOL/L (ref 99–110)
CHOLESTEROL: 208 MG/DL
CO2: 29 MMOL/L (ref 21–32)
CO2: 35 MMOL/L (ref 21–32)
CO2: 35 MMOL/L (ref 21–32)
CO2: 36 MMOL/L (ref 21–32)
CREAT SERPL-MCNC: 0.7 MG/DL (ref 0.6–1.1)
CREAT SERPL-MCNC: 0.7 MG/DL (ref 0.6–1.1)
CREAT SERPL-MCNC: 0.8 MG/DL (ref 0.6–1.1)
CREAT SERPL-MCNC: 1 MG/DL (ref 0.6–1.1)
DIFFERENTIAL TYPE: ABNORMAL
DIFFERENTIAL TYPE: ABNORMAL
EKG ATRIAL RATE: 54 BPM
EKG ATRIAL RATE: 66 BPM
EKG ATRIAL RATE: 72 BPM
EKG ATRIAL RATE: 74 BPM
EKG DIAGNOSIS: NORMAL
EKG P AXIS: 84 DEGREES
EKG P AXIS: 86 DEGREES
EKG P AXIS: 91 DEGREES
EKG P AXIS: 94 DEGREES
EKG P-R INTERVAL: 152 MS
EKG P-R INTERVAL: 172 MS
EKG Q-T INTERVAL: 384 MS
EKG Q-T INTERVAL: 392 MS
EKG Q-T INTERVAL: 416 MS
EKG Q-T INTERVAL: 448 MS
EKG QRS DURATION: 68 MS
EKG QRS DURATION: 76 MS
EKG QRS DURATION: 80 MS
EKG QRS DURATION: 84 MS
EKG QTC CALCULATION (BAZETT): 424 MS
EKG QTC CALCULATION (BAZETT): 426 MS
EKG QTC CALCULATION (BAZETT): 429 MS
EKG QTC CALCULATION (BAZETT): 436 MS
EKG R AXIS: 63 DEGREES
EKG R AXIS: 66 DEGREES
EKG T AXIS: 73 DEGREES
EKG T AXIS: 73 DEGREES
EKG T AXIS: 78 DEGREES
EKG T AXIS: 82 DEGREES
EKG VENTRICULAR RATE: 54 BPM
EKG VENTRICULAR RATE: 66 BPM
EKG VENTRICULAR RATE: 72 BPM
EKG VENTRICULAR RATE: 74 BPM
EOSINOPHILS ABSOLUTE: 0.1 K/CU MM
EOSINOPHILS ABSOLUTE: 0.2 K/CU MM
EOSINOPHILS RELATIVE PERCENT: 2.7 % (ref 0–3)
EOSINOPHILS RELATIVE PERCENT: 3.6 % (ref 0–3)
FOLATE: >20 NG/ML (ref 3.1–17.5)
GFR AFRICAN AMERICAN: >60 ML/MIN/1.73M2
GFR NON-AFRICAN AMERICAN: 53 ML/MIN/1.73M2
GFR NON-AFRICAN AMERICAN: >60 ML/MIN/1.73M2
GLUCOSE BLD-MCNC: 100 MG/DL (ref 70–99)
GLUCOSE BLD-MCNC: 114 MG/DL (ref 70–99)
GLUCOSE BLD-MCNC: 84 MG/DL (ref 70–99)
GLUCOSE BLD-MCNC: 96 MG/DL (ref 70–99)
HCT VFR BLD CALC: 31.7 % (ref 37–47)
HCT VFR BLD CALC: 32.5 % (ref 37–47)
HDLC SERPL-MCNC: 106 MG/DL
HEMOGLOBIN: 10.1 GM/DL (ref 12.5–16)
HEMOGLOBIN: 10.2 GM/DL (ref 12.5–16)
IMMATURE NEUTROPHIL %: 0.8 % (ref 0–0.43)
IMMATURE NEUTROPHIL %: 0.8 % (ref 0–0.43)
IRON: 46 UG/DL (ref 37–145)
LDL CHOLESTEROL DIRECT: 89 MG/DL
LIPASE: 24 IU/L (ref 13–60)
LV EF: 60 %
LVEF MODALITY: NORMAL
LYMPHOCYTES ABSOLUTE: 0.8 K/CU MM
LYMPHOCYTES ABSOLUTE: 0.9 K/CU MM
LYMPHOCYTES RELATIVE PERCENT: 15.8 % (ref 24–44)
LYMPHOCYTES RELATIVE PERCENT: 17.5 % (ref 24–44)
MAGNESIUM: 1.9 MG/DL (ref 1.8–2.4)
MAGNESIUM: 2.2 MG/DL (ref 1.8–2.4)
MCH RBC QN AUTO: 32.1 PG (ref 27–31)
MCH RBC QN AUTO: 32.1 PG (ref 27–31)
MCHC RBC AUTO-ENTMCNC: 31.4 % (ref 32–36)
MCHC RBC AUTO-ENTMCNC: 31.9 % (ref 32–36)
MCV RBC AUTO: 100.6 FL (ref 78–100)
MCV RBC AUTO: 102.2 FL (ref 78–100)
MONOCYTES ABSOLUTE: 0.7 K/CU MM
MONOCYTES ABSOLUTE: 0.8 K/CU MM
MONOCYTES RELATIVE PERCENT: 13.8 % (ref 0–4)
MONOCYTES RELATIVE PERCENT: 15.4 % (ref 0–4)
NUCLEATED RBC %: 0 %
NUCLEATED RBC %: 0 %
PCT TRANSFERRIN: 17 % (ref 10–44)
PDW BLD-RTO: 13.4 % (ref 11.7–14.9)
PDW BLD-RTO: 14.6 % (ref 11.7–14.9)
PHOSPHORUS: 2.9 MG/DL (ref 2.5–4.9)
PLATELET # BLD: 273 K/CU MM (ref 140–440)
PLATELET # BLD: 293 K/CU MM (ref 140–440)
PMV BLD AUTO: 9.2 FL (ref 7.5–11.1)
PMV BLD AUTO: 9.5 FL (ref 7.5–11.1)
POTASSIUM SERPL-SCNC: 3.3 MMOL/L (ref 3.5–5.1)
POTASSIUM SERPL-SCNC: 4.6 MMOL/L (ref 3.5–5.1)
POTASSIUM SERPL-SCNC: 4.7 MMOL/L (ref 3.5–5.1)
POTASSIUM SERPL-SCNC: 4.8 MMOL/L (ref 3.5–5.1)
PRO-BNP: 1101 PG/ML
PRO-BNP: 218.1 PG/ML
RBC # BLD: 3.15 M/CU MM (ref 4.2–5.4)
RBC # BLD: 3.18 M/CU MM (ref 4.2–5.4)
SARS-COV-2, NAAT: NOT DETECTED
SARS-COV-2, NAAT: NOT DETECTED
SARS-COV-2: NOT DETECTED
SEGMENTED NEUTROPHILS ABSOLUTE COUNT: 3.3 K/CU MM
SEGMENTED NEUTROPHILS ABSOLUTE COUNT: 3.3 K/CU MM
SEGMENTED NEUTROPHILS RELATIVE PERCENT: 63.2 % (ref 36–66)
SEGMENTED NEUTROPHILS RELATIVE PERCENT: 65.6 % (ref 36–66)
SODIUM BLD-SCNC: 138 MMOL/L (ref 135–145)
SODIUM BLD-SCNC: 139 MMOL/L (ref 135–145)
SODIUM BLD-SCNC: 143 MMOL/L (ref 135–145)
SODIUM BLD-SCNC: 145 MMOL/L (ref 135–145)
SOURCE: NORMAL
TOTAL IMMATURE NEUTOROPHIL: 0.04 K/CU MM
TOTAL IMMATURE NEUTOROPHIL: 0.04 K/CU MM
TOTAL IRON BINDING CAPACITY: 268 UG/DL (ref 250–450)
TOTAL NUCLEATED RBC: 0 K/CU MM
TOTAL NUCLEATED RBC: 0 K/CU MM
TOTAL PROTEIN: 6.1 GM/DL (ref 6.4–8.2)
TOTAL PROTEIN: 6.7 GM/DL (ref 6.4–8.2)
TRIGL SERPL-MCNC: 43 MG/DL
TROPONIN T: <0.01 NG/ML
TSH HIGH SENSITIVITY: 0.33 UIU/ML (ref 0.27–4.2)
UNSATURATED IRON BINDING CAPACITY: 222 UG/DL (ref 110–370)
VITAMIN B-12: 896.2 PG/ML (ref 211–911)
VITAMIN D 25-HYDROXY: 21.64 NG/ML
WBC # BLD: 5 K/CU MM (ref 4–10.5)
WBC # BLD: 5.1 K/CU MM (ref 4–10.5)

## 2021-01-01 PROCEDURE — 85025 COMPLETE CBC W/AUTO DIFF WBC: CPT

## 2021-01-01 PROCEDURE — G8399 PT W/DXA RESULTS DOCUMENT: HCPCS | Performed by: FAMILY MEDICINE

## 2021-01-01 PROCEDURE — 83880 ASSAY OF NATRIURETIC PEPTIDE: CPT

## 2021-01-01 PROCEDURE — G0378 HOSPITAL OBSERVATION PER HR: HCPCS

## 2021-01-01 PROCEDURE — 82607 VITAMIN B-12: CPT

## 2021-01-01 PROCEDURE — 2580000003 HC RX 258: Performed by: INTERNAL MEDICINE

## 2021-01-01 PROCEDURE — 6370000000 HC RX 637 (ALT 250 FOR IP): Performed by: EMERGENCY MEDICINE

## 2021-01-01 PROCEDURE — 6360000002 HC RX W HCPCS: Performed by: NURSE PRACTITIONER

## 2021-01-01 PROCEDURE — 94640 AIRWAY INHALATION TREATMENT: CPT

## 2021-01-01 PROCEDURE — 6370000000 HC RX 637 (ALT 250 FOR IP): Performed by: INTERNAL MEDICINE

## 2021-01-01 PROCEDURE — 1123F ACP DISCUSS/DSCN MKR DOCD: CPT | Performed by: FAMILY MEDICINE

## 2021-01-01 PROCEDURE — 74177 CT ABD & PELVIS W/CONTRAST: CPT

## 2021-01-01 PROCEDURE — 1090F PRES/ABSN URINE INCON ASSESS: CPT | Performed by: INTERNAL MEDICINE

## 2021-01-01 PROCEDURE — G8926 SPIRO NO PERF OR DOC: HCPCS | Performed by: FAMILY MEDICINE

## 2021-01-01 PROCEDURE — 99214 OFFICE O/P EST MOD 30 MIN: CPT | Performed by: FAMILY MEDICINE

## 2021-01-01 PROCEDURE — 6370000000 HC RX 637 (ALT 250 FOR IP): Performed by: NURSE PRACTITIONER

## 2021-01-01 PROCEDURE — 96365 THER/PROPH/DIAG IV INF INIT: CPT

## 2021-01-01 PROCEDURE — 99222 1ST HOSP IP/OBS MODERATE 55: CPT | Performed by: INTERNAL MEDICINE

## 2021-01-01 PROCEDURE — 99285 EMERGENCY DEPT VISIT HI MDM: CPT

## 2021-01-01 PROCEDURE — 94761 N-INVAS EAR/PLS OXIMETRY MLT: CPT

## 2021-01-01 PROCEDURE — 94010 BREATHING CAPACITY TEST: CPT

## 2021-01-01 PROCEDURE — 80061 LIPID PANEL: CPT

## 2021-01-01 PROCEDURE — 1036F TOBACCO NON-USER: CPT | Performed by: FAMILY MEDICINE

## 2021-01-01 PROCEDURE — 96372 THER/PROPH/DIAG INJ SC/IM: CPT

## 2021-01-01 PROCEDURE — 80053 COMPREHEN METABOLIC PANEL: CPT

## 2021-01-01 PROCEDURE — 1111F DSCHRG MED/CURRENT MED MERGE: CPT | Performed by: FAMILY MEDICINE

## 2021-01-01 PROCEDURE — 3430000000 HC RX DIAGNOSTIC RADIOPHARMACEUTICAL: Performed by: FAMILY MEDICINE

## 2021-01-01 PROCEDURE — G8427 DOCREV CUR MEDS BY ELIG CLIN: HCPCS | Performed by: FAMILY MEDICINE

## 2021-01-01 PROCEDURE — 84443 ASSAY THYROID STIM HORMONE: CPT

## 2021-01-01 PROCEDURE — 83540 ASSAY OF IRON: CPT

## 2021-01-01 PROCEDURE — 99232 SBSQ HOSP IP/OBS MODERATE 35: CPT | Performed by: INTERNAL MEDICINE

## 2021-01-01 PROCEDURE — 80048 BASIC METABOLIC PNL TOTAL CA: CPT

## 2021-01-01 PROCEDURE — 84100 ASSAY OF PHOSPHORUS: CPT

## 2021-01-01 PROCEDURE — 99215 OFFICE O/P EST HI 40 MIN: CPT | Performed by: FAMILY MEDICINE

## 2021-01-01 PROCEDURE — 3023F SPIROM DOC REV: CPT | Performed by: FAMILY MEDICINE

## 2021-01-01 PROCEDURE — A9552 F18 FDG: HCPCS | Performed by: FAMILY MEDICINE

## 2021-01-01 PROCEDURE — 6360000002 HC RX W HCPCS: Performed by: INTERNAL MEDICINE

## 2021-01-01 PROCEDURE — G8484 FLU IMMUNIZE NO ADMIN: HCPCS | Performed by: INTERNAL MEDICINE

## 2021-01-01 PROCEDURE — 82306 VITAMIN D 25 HYDROXY: CPT

## 2021-01-01 PROCEDURE — 83550 IRON BINDING TEST: CPT

## 2021-01-01 PROCEDURE — 2700000000 HC OXYGEN THERAPY PER DAY

## 2021-01-01 PROCEDURE — 2580000003 HC RX 258: Performed by: FAMILY MEDICINE

## 2021-01-01 PROCEDURE — 83735 ASSAY OF MAGNESIUM: CPT

## 2021-01-01 PROCEDURE — 71275 CT ANGIOGRAPHY CHEST: CPT

## 2021-01-01 PROCEDURE — G0439 PPPS, SUBSEQ VISIT: HCPCS | Performed by: FAMILY MEDICINE

## 2021-01-01 PROCEDURE — 83690 ASSAY OF LIPASE: CPT

## 2021-01-01 PROCEDURE — 78815 PET IMAGE W/CT SKULL-THIGH: CPT

## 2021-01-01 PROCEDURE — 6360000002 HC RX W HCPCS: Performed by: EMERGENCY MEDICINE

## 2021-01-01 PROCEDURE — G8419 CALC BMI OUT NRM PARAM NOF/U: HCPCS | Performed by: INTERNAL MEDICINE

## 2021-01-01 PROCEDURE — G0008 ADMIN INFLUENZA VIRUS VAC: HCPCS | Performed by: FAMILY MEDICINE

## 2021-01-01 PROCEDURE — 4040F PNEUMOC VAC/ADMIN/RCVD: CPT | Performed by: FAMILY MEDICINE

## 2021-01-01 PROCEDURE — 93005 ELECTROCARDIOGRAM TRACING: CPT | Performed by: EMERGENCY MEDICINE

## 2021-01-01 PROCEDURE — 99283 EMERGENCY DEPT VISIT LOW MDM: CPT

## 2021-01-01 PROCEDURE — 84484 ASSAY OF TROPONIN QUANT: CPT

## 2021-01-01 PROCEDURE — 1090F PRES/ABSN URINE INCON ASSESS: CPT | Performed by: FAMILY MEDICINE

## 2021-01-01 PROCEDURE — 99202 OFFICE O/P NEW SF 15 MIN: CPT

## 2021-01-01 PROCEDURE — 93306 TTE W/DOPPLER COMPLETE: CPT

## 2021-01-01 PROCEDURE — 93970 EXTREMITY STUDY: CPT

## 2021-01-01 PROCEDURE — G8427 DOCREV CUR MEDS BY ELIG CLIN: HCPCS | Performed by: INTERNAL MEDICINE

## 2021-01-01 PROCEDURE — 93005 ELECTROCARDIOGRAM TRACING: CPT | Performed by: NURSE PRACTITIONER

## 2021-01-01 PROCEDURE — 93005 ELECTROCARDIOGRAM TRACING: CPT | Performed by: FAMILY MEDICINE

## 2021-01-01 PROCEDURE — G8419 CALC BMI OUT NRM PARAM NOF/U: HCPCS | Performed by: FAMILY MEDICINE

## 2021-01-01 PROCEDURE — 87635 SARS-COV-2 COVID-19 AMP PRB: CPT

## 2021-01-01 PROCEDURE — 6360000004 HC RX CONTRAST MEDICATION: Performed by: STUDENT IN AN ORGANIZED HEALTH CARE EDUCATION/TRAINING PROGRAM

## 2021-01-01 PROCEDURE — 82746 ASSAY OF FOLIC ACID SERUM: CPT

## 2021-01-01 PROCEDURE — 71045 X-RAY EXAM CHEST 1 VIEW: CPT

## 2021-01-01 PROCEDURE — 93010 ELECTROCARDIOGRAM REPORT: CPT | Performed by: INTERNAL MEDICINE

## 2021-01-01 PROCEDURE — 90694 VACC AIIV4 NO PRSRV 0.5ML IM: CPT | Performed by: FAMILY MEDICINE

## 2021-01-01 PROCEDURE — 99205 OFFICE O/P NEW HI 60 MIN: CPT | Performed by: INTERNAL MEDICINE

## 2021-01-01 PROCEDURE — U0005 INFEC AGEN DETEC AMPLI PROBE: HCPCS

## 2021-01-01 PROCEDURE — 83721 ASSAY OF BLOOD LIPOPROTEIN: CPT

## 2021-01-01 PROCEDURE — 96366 THER/PROPH/DIAG IV INF ADDON: CPT

## 2021-01-01 PROCEDURE — 94664 DEMO&/EVAL PT USE INHALER: CPT

## 2021-01-01 PROCEDURE — 96375 TX/PRO/DX INJ NEW DRUG ADDON: CPT

## 2021-01-01 PROCEDURE — U0003 INFECTIOUS AGENT DETECTION BY NUCLEIC ACID (DNA OR RNA); SEVERE ACUTE RESPIRATORY SYNDROME CORONAVIRUS 2 (SARS-COV-2) (CORONAVIRUS DISEASE [COVID-19]), AMPLIFIED PROBE TECHNIQUE, MAKING USE OF HIGH THROUGHPUT TECHNOLOGIES AS DESCRIBED BY CMS-2020-01-R: HCPCS

## 2021-01-01 RX ORDER — FUROSEMIDE 20 MG/1
20 TABLET ORAL DAILY
Qty: 60 TABLET | Refills: 3 | Status: SHIPPED | OUTPATIENT
Start: 2021-01-01 | End: 2021-01-01

## 2021-01-01 RX ORDER — SODIUM CHLORIDE 0.9 % (FLUSH) 0.9 %
10 SYRINGE (ML) INJECTION PRN
Status: COMPLETED | OUTPATIENT
Start: 2021-01-01 | End: 2021-01-01

## 2021-01-01 RX ORDER — FUROSEMIDE 20 MG/1
20 TABLET ORAL DAILY
Qty: 60 TABLET | Refills: 0 | Status: SHIPPED | OUTPATIENT
Start: 2021-01-01 | End: 2022-01-01

## 2021-01-01 RX ORDER — ATORVASTATIN CALCIUM 80 MG/1
80 TABLET, FILM COATED ORAL DAILY
Qty: 90 TABLET | Refills: 3 | Status: SHIPPED | OUTPATIENT
Start: 2021-01-01 | End: 2022-01-01 | Stop reason: ALTCHOICE

## 2021-01-01 RX ORDER — CALCIUM POLYCARBOPHIL 625 MG
625 TABLET ORAL DAILY
Qty: 90 TABLET | Refills: 3 | Status: SHIPPED | OUTPATIENT
Start: 2021-01-01

## 2021-01-01 RX ORDER — LANOLIN ALCOHOL/MO/W.PET/CERES
3 CREAM (GRAM) TOPICAL ONCE
Status: COMPLETED | OUTPATIENT
Start: 2021-01-01 | End: 2021-01-01

## 2021-01-01 RX ORDER — BUDESONIDE 0.5 MG/2ML
500 INHALANT ORAL 2 TIMES DAILY
Qty: 120 ML | Refills: 0 | Status: SHIPPED | OUTPATIENT
Start: 2021-01-01 | End: 2021-01-01

## 2021-01-01 RX ORDER — FLUDEOXYGLUCOSE F 18 200 MCI/ML
12.22 INJECTION, SOLUTION INTRAVENOUS
Status: COMPLETED | OUTPATIENT
Start: 2021-01-01 | End: 2021-01-01

## 2021-01-01 RX ORDER — ACETAMINOPHEN 325 MG/1
650 TABLET ORAL EVERY 6 HOURS PRN
Status: DISCONTINUED | OUTPATIENT
Start: 2021-01-01 | End: 2021-01-01 | Stop reason: HOSPADM

## 2021-01-01 RX ORDER — M-VIT,TX,IRON,MINS/CALC/FOLIC 27MG-0.4MG
1 TABLET ORAL DAILY
Status: DISCONTINUED | OUTPATIENT
Start: 2021-01-01 | End: 2021-01-01 | Stop reason: HOSPADM

## 2021-01-01 RX ORDER — BUDESONIDE 0.5 MG/2ML
500 INHALANT ORAL 2 TIMES DAILY
Qty: 360 ML | Refills: 3 | Status: SHIPPED | OUTPATIENT
Start: 2021-01-01 | End: 2022-01-01

## 2021-01-01 RX ORDER — ALBUTEROL SULFATE 2.5 MG/3ML
2.5 SOLUTION RESPIRATORY (INHALATION) 4 TIMES DAILY
Qty: 1080 ML | Refills: 3 | Status: SHIPPED | OUTPATIENT
Start: 2021-01-01 | End: 2022-01-01

## 2021-01-01 RX ORDER — FUROSEMIDE 20 MG/1
20 TABLET ORAL DAILY
Qty: 60 TABLET | Refills: 3 | Status: SHIPPED | OUTPATIENT
Start: 2021-01-01 | End: 2022-01-01 | Stop reason: ALTCHOICE

## 2021-01-01 RX ORDER — ALBUTEROL SULFATE 90 UG/1
AEROSOL, METERED RESPIRATORY (INHALATION)
Qty: 8.5 G | Refills: 3 | OUTPATIENT
Start: 2021-01-01

## 2021-01-01 RX ORDER — DONEPEZIL HYDROCHLORIDE 10 MG/1
10 TABLET, FILM COATED ORAL NIGHTLY
Status: DISCONTINUED | OUTPATIENT
Start: 2021-01-01 | End: 2021-01-01 | Stop reason: HOSPADM

## 2021-01-01 RX ORDER — AMLODIPINE BESYLATE 5 MG/1
5 TABLET ORAL DAILY
Status: DISCONTINUED | OUTPATIENT
Start: 2021-01-01 | End: 2021-01-01 | Stop reason: HOSPADM

## 2021-01-01 RX ORDER — ASPIRIN 325 MG
325 TABLET ORAL EVERY MORNING
Status: DISCONTINUED | OUTPATIENT
Start: 2021-01-01 | End: 2021-01-01 | Stop reason: HOSPADM

## 2021-01-01 RX ORDER — BUDESONIDE 0.25 MG/2ML
500 INHALANT ORAL 2 TIMES DAILY
Status: DISCONTINUED | OUTPATIENT
Start: 2021-01-01 | End: 2021-01-01 | Stop reason: HOSPADM

## 2021-01-01 RX ORDER — NALTREXONE HYDROCHLORIDE 50 MG/1
50 TABLET, FILM COATED ORAL DAILY
Qty: 90 TABLET | Refills: 0 | Status: SHIPPED | OUTPATIENT
Start: 2021-01-01 | End: 2022-01-01

## 2021-01-01 RX ORDER — PREDNISONE 10 MG/1
10 TABLET ORAL DAILY
COMMUNITY
End: 2021-01-01

## 2021-01-01 RX ORDER — FOLIC ACID 1 MG/1
1 TABLET ORAL DAILY
Status: DISCONTINUED | OUTPATIENT
Start: 2021-01-01 | End: 2021-01-01 | Stop reason: HOSPADM

## 2021-01-01 RX ORDER — PYRIDOXINE HCL (VITAMIN B6) 50 MG
50 TABLET ORAL DAILY
Qty: 90 TABLET | Refills: 3 | Status: SHIPPED | OUTPATIENT
Start: 2021-01-01 | End: 2022-01-01 | Stop reason: ALTCHOICE

## 2021-01-01 RX ORDER — AMLODIPINE BESYLATE 5 MG/1
5 TABLET ORAL DAILY
Qty: 90 TABLET | Refills: 1 | Status: SHIPPED | OUTPATIENT
Start: 2021-01-01 | End: 2022-01-01

## 2021-01-01 RX ORDER — ONDANSETRON 2 MG/ML
4 INJECTION INTRAMUSCULAR; INTRAVENOUS EVERY 6 HOURS PRN
Status: DISCONTINUED | OUTPATIENT
Start: 2021-01-01 | End: 2021-01-01 | Stop reason: HOSPADM

## 2021-01-01 RX ORDER — PREDNISONE 50 MG/1
50 TABLET ORAL DAILY
Qty: 5 TABLET | Refills: 0 | Status: SHIPPED | OUTPATIENT
Start: 2021-01-01 | End: 2021-01-01 | Stop reason: SDUPTHER

## 2021-01-01 RX ORDER — NALTREXONE HYDROCHLORIDE 50 MG/1
TABLET, FILM COATED ORAL
Qty: 30 TABLET | Refills: 0 | Status: SHIPPED | OUTPATIENT
Start: 2021-01-01 | End: 2021-01-01 | Stop reason: SDUPTHER

## 2021-01-01 RX ORDER — CALCIUM POLYCARBOPHIL 625 MG/1
TABLET, FILM COATED ORAL
Qty: 30 TABLET | Refills: 3 | OUTPATIENT
Start: 2021-01-01

## 2021-01-01 RX ORDER — CLOPIDOGREL BISULFATE 75 MG/1
75 TABLET ORAL DAILY
Qty: 90 TABLET | Refills: 3 | Status: ON HOLD | OUTPATIENT
Start: 2021-01-01 | End: 2022-01-01 | Stop reason: HOSPADM

## 2021-01-01 RX ORDER — PREDNISONE 10 MG/1
10 TABLET ORAL DAILY
Qty: 30 TABLET | Refills: 0 | Status: SHIPPED | OUTPATIENT
Start: 2021-01-01 | End: 2021-01-01

## 2021-01-01 RX ORDER — CLOPIDOGREL BISULFATE 75 MG/1
75 TABLET ORAL DAILY
Status: DISCONTINUED | OUTPATIENT
Start: 2021-01-01 | End: 2021-01-01 | Stop reason: HOSPADM

## 2021-01-01 RX ORDER — ALBUTEROL SULFATE 2.5 MG/3ML
2.5 SOLUTION RESPIRATORY (INHALATION) 4 TIMES DAILY
Qty: 1080 ML | Refills: 3 | Status: SHIPPED | OUTPATIENT
Start: 2021-01-01 | End: 2021-01-01

## 2021-01-01 RX ORDER — ALBUTEROL SULFATE 2.5 MG/3ML
2.5 SOLUTION RESPIRATORY (INHALATION) 4 TIMES DAILY
Qty: 360 ML | Refills: 0 | Status: SHIPPED | OUTPATIENT
Start: 2021-01-01 | End: 2021-01-01

## 2021-01-01 RX ORDER — FUROSEMIDE 20 MG/1
20 TABLET ORAL DAILY
Status: DISCONTINUED | OUTPATIENT
Start: 2021-01-01 | End: 2021-01-01 | Stop reason: HOSPADM

## 2021-01-01 RX ORDER — LANOLIN ALCOHOL/MO/W.PET/CERES
50 CREAM (GRAM) TOPICAL DAILY
Status: DISCONTINUED | OUTPATIENT
Start: 2021-01-01 | End: 2021-01-01 | Stop reason: HOSPADM

## 2021-01-01 RX ORDER — DEXAMETHASONE 4 MG/1
4 TABLET ORAL
COMMUNITY
End: 2022-01-01 | Stop reason: ALTCHOICE

## 2021-01-01 RX ORDER — CALCIUM POLYCARBOPHIL 625 MG 625 MG/1
625 TABLET ORAL DAILY
Status: DISCONTINUED | OUTPATIENT
Start: 2021-01-01 | End: 2021-01-01 | Stop reason: HOSPADM

## 2021-01-01 RX ORDER — POTASSIUM CHLORIDE 750 MG/1
10 TABLET, EXTENDED RELEASE ORAL DAILY
Qty: 90 TABLET | Refills: 3 | Status: SHIPPED | OUTPATIENT
Start: 2021-01-01 | End: 2022-01-01 | Stop reason: ALTCHOICE

## 2021-01-01 RX ORDER — ALBUTEROL SULFATE 2.5 MG/3ML
2.5 SOLUTION RESPIRATORY (INHALATION)
Status: DISCONTINUED | OUTPATIENT
Start: 2021-01-01 | End: 2021-01-01

## 2021-01-01 RX ORDER — BUDESONIDE 0.5 MG/2ML
500 INHALANT ORAL 2 TIMES DAILY
Qty: 180 AMPULE | Refills: 0 | Status: SHIPPED | OUTPATIENT
Start: 2021-01-01 | End: 2021-01-01 | Stop reason: SDUPTHER

## 2021-01-01 RX ORDER — ATORVASTATIN CALCIUM 80 MG/1
80 TABLET, FILM COATED ORAL NIGHTLY
Status: DISCONTINUED | OUTPATIENT
Start: 2021-01-01 | End: 2021-01-01 | Stop reason: HOSPADM

## 2021-01-01 RX ORDER — FUROSEMIDE 10 MG/ML
20 INJECTION INTRAMUSCULAR; INTRAVENOUS DAILY
Status: DISCONTINUED | OUTPATIENT
Start: 2021-01-01 | End: 2021-01-01

## 2021-01-01 RX ORDER — POTASSIUM CHLORIDE 20 MEQ/1
60 TABLET, EXTENDED RELEASE ORAL ONCE
Status: COMPLETED | OUTPATIENT
Start: 2021-01-01 | End: 2021-01-01

## 2021-01-01 RX ORDER — AMLODIPINE BESYLATE 5 MG/1
5 TABLET ORAL DAILY
Qty: 30 TABLET | Refills: 3 | Status: SHIPPED | OUTPATIENT
Start: 2021-01-01 | End: 2021-01-01 | Stop reason: SDUPTHER

## 2021-01-01 RX ORDER — IPRATROPIUM BROMIDE 17 UG/1
AEROSOL, METERED RESPIRATORY (INHALATION)
Qty: 12.9 G | Refills: 3 | OUTPATIENT
Start: 2021-01-01

## 2021-01-01 RX ORDER — SODIUM CHLORIDE 9 MG/ML
25 INJECTION, SOLUTION INTRAVENOUS PRN
Status: DISCONTINUED | OUTPATIENT
Start: 2021-01-01 | End: 2021-01-01 | Stop reason: HOSPADM

## 2021-01-01 RX ORDER — BUDESONIDE 0.5 MG/2ML
500 INHALANT ORAL 2 TIMES DAILY
Qty: 60 AMPULE | Refills: 0 | Status: SHIPPED | OUTPATIENT
Start: 2021-01-01 | End: 2021-01-01 | Stop reason: SDUPTHER

## 2021-01-01 RX ORDER — PYRIDOXINE HCL (VITAMIN B6) 50 MG
50 TABLET ORAL DAILY
Qty: 30 TABLET | Refills: 3 | Status: SHIPPED | OUTPATIENT
Start: 2021-01-01 | End: 2021-01-01

## 2021-01-01 RX ORDER — ALBUTEROL SULFATE 90 UG/1
2 AEROSOL, METERED RESPIRATORY (INHALATION) EVERY 4 HOURS PRN
Status: DISCONTINUED | OUTPATIENT
Start: 2021-01-01 | End: 2021-01-01 | Stop reason: HOSPADM

## 2021-01-01 RX ORDER — FOLIC ACID 1 MG/1
1 TABLET ORAL DAILY
Qty: 90 TABLET | Refills: 3 | Status: SHIPPED | OUTPATIENT
Start: 2021-01-01 | End: 2022-01-01 | Stop reason: ALTCHOICE

## 2021-01-01 RX ORDER — SODIUM CHLORIDE 0.9 % (FLUSH) 0.9 %
5-40 SYRINGE (ML) INJECTION PRN
Status: DISCONTINUED | OUTPATIENT
Start: 2021-01-01 | End: 2021-01-01 | Stop reason: HOSPADM

## 2021-01-01 RX ORDER — PREDNISONE 50 MG/1
50 TABLET ORAL DAILY
Qty: 5 TABLET | Refills: 0 | Status: SHIPPED | OUTPATIENT
Start: 2021-01-01 | End: 2021-01-01

## 2021-01-01 RX ORDER — ALBUTEROL SULFATE 90 UG/1
2 AEROSOL, METERED RESPIRATORY (INHALATION) 3 TIMES DAILY
Status: DISCONTINUED | OUTPATIENT
Start: 2021-01-01 | End: 2021-01-01 | Stop reason: HOSPADM

## 2021-01-01 RX ORDER — PREDNISONE 20 MG/1
40 TABLET ORAL ONCE
Status: COMPLETED | OUTPATIENT
Start: 2021-01-01 | End: 2021-01-01

## 2021-01-01 RX ORDER — PYRIDOXINE HCL (VITAMIN B6) 50 MG
50 TABLET ORAL DAILY
Qty: 30 TABLET | Refills: 3 | Status: SHIPPED | OUTPATIENT
Start: 2021-01-01 | End: 2021-01-01 | Stop reason: SDUPTHER

## 2021-01-01 RX ORDER — NALTREXONE HYDROCHLORIDE 50 MG/1
50 TABLET, FILM COATED ORAL DAILY
Qty: 30 TABLET | Refills: 10 | OUTPATIENT
Start: 2021-01-01

## 2021-01-01 RX ORDER — ALBUTEROL SULFATE 90 UG/1
2 AEROSOL, METERED RESPIRATORY (INHALATION) ONCE
Status: COMPLETED | OUTPATIENT
Start: 2021-01-01 | End: 2021-01-01

## 2021-01-01 RX ORDER — ALBUTEROL SULFATE 2.5 MG/3ML
2.5 SOLUTION RESPIRATORY (INHALATION) 4 TIMES DAILY
Status: DISCONTINUED | OUTPATIENT
Start: 2021-01-01 | End: 2021-01-01

## 2021-01-01 RX ORDER — AMLODIPINE BESYLATE 5 MG/1
5 TABLET ORAL DAILY
Qty: 30 TABLET | Refills: 3 | Status: SHIPPED | OUTPATIENT
Start: 2021-01-01 | End: 2021-01-01

## 2021-01-01 RX ORDER — ACETAMINOPHEN 650 MG/1
650 SUPPOSITORY RECTAL EVERY 6 HOURS PRN
Status: DISCONTINUED | OUTPATIENT
Start: 2021-01-01 | End: 2021-01-01 | Stop reason: HOSPADM

## 2021-01-01 RX ORDER — PREDNISONE 10 MG/1
10 TABLET ORAL DAILY
Status: DISCONTINUED | OUTPATIENT
Start: 2021-01-01 | End: 2021-01-01 | Stop reason: HOSPADM

## 2021-01-01 RX ORDER — ALBUTEROL SULFATE 90 UG/1
2 AEROSOL, METERED RESPIRATORY (INHALATION) EVERY 6 HOURS PRN
Qty: 3 EACH | Refills: 3 | Status: SHIPPED | OUTPATIENT
Start: 2021-01-01

## 2021-01-01 RX ORDER — ALBUTEROL SULFATE 2.5 MG/3ML
2.5 SOLUTION RESPIRATORY (INHALATION) ONCE
Status: DISCONTINUED | OUTPATIENT
Start: 2021-01-01 | End: 2021-01-01

## 2021-01-01 RX ORDER — LANOLIN ALCOHOL/MO/W.PET/CERES
100 CREAM (GRAM) TOPICAL DAILY
Status: DISCONTINUED | OUTPATIENT
Start: 2021-01-01 | End: 2021-01-01 | Stop reason: HOSPADM

## 2021-01-01 RX ORDER — CLOPIDOGREL BISULFATE 75 MG/1
75 TABLET ORAL DAILY
Qty: 90 TABLET | Refills: 3 | OUTPATIENT
Start: 2021-01-01

## 2021-01-01 RX ORDER — ONDANSETRON 4 MG/1
4 TABLET, ORALLY DISINTEGRATING ORAL EVERY 8 HOURS PRN
Status: DISCONTINUED | OUTPATIENT
Start: 2021-01-01 | End: 2021-01-01 | Stop reason: HOSPADM

## 2021-01-01 RX ORDER — SODIUM CHLORIDE 0.9 % (FLUSH) 0.9 %
5-40 SYRINGE (ML) INJECTION EVERY 12 HOURS SCHEDULED
Status: DISCONTINUED | OUTPATIENT
Start: 2021-01-01 | End: 2021-01-01 | Stop reason: HOSPADM

## 2021-01-01 RX ORDER — SODIUM CHLORIDE 0.9 % (FLUSH) 0.9 %
10 SYRINGE (ML) INJECTION PRN
Status: CANCELLED | OUTPATIENT
Start: 2021-01-01

## 2021-01-01 RX ORDER — ALBUTEROL SULFATE 2.5 MG/3ML
15 SOLUTION RESPIRATORY (INHALATION)
Status: DISCONTINUED | OUTPATIENT
Start: 2021-01-01 | End: 2021-01-01

## 2021-01-01 RX ORDER — M-VIT,TX,IRON,MINS/CALC/FOLIC 27MG-0.4MG
1 TABLET ORAL DAILY
Qty: 90 TABLET | Refills: 3 | Status: SHIPPED | OUTPATIENT
Start: 2021-01-01 | End: 2021-01-01

## 2021-01-01 RX ORDER — POLYETHYLENE GLYCOL 3350 17 G/17G
17 POWDER, FOR SOLUTION ORAL DAILY PRN
Status: DISCONTINUED | OUTPATIENT
Start: 2021-01-01 | End: 2021-01-01 | Stop reason: HOSPADM

## 2021-01-01 RX ORDER — BUDESONIDE 0.5 MG/2ML
500 INHALANT ORAL 2 TIMES DAILY
Qty: 360 ML | Refills: 3 | Status: SHIPPED | OUTPATIENT
Start: 2021-01-01 | End: 2021-01-01

## 2021-01-01 RX ORDER — MAGNESIUM SULFATE IN WATER 40 MG/ML
2000 INJECTION, SOLUTION INTRAVENOUS ONCE
Status: COMPLETED | OUTPATIENT
Start: 2021-01-01 | End: 2021-01-01

## 2021-01-01 RX ORDER — FUROSEMIDE 20 MG/1
20 TABLET ORAL DAILY
Qty: 60 TABLET | Refills: 3 | Status: SHIPPED | OUTPATIENT
Start: 2021-01-01 | End: 2021-01-01 | Stop reason: SDUPTHER

## 2021-01-01 RX ORDER — POTASSIUM CHLORIDE 750 MG/1
10 TABLET, EXTENDED RELEASE ORAL 2 TIMES DAILY
Qty: 60 TABLET | Refills: 0 | Status: SHIPPED | OUTPATIENT
Start: 2021-01-01 | End: 2021-01-01 | Stop reason: SDUPTHER

## 2021-01-01 RX ADMIN — FLUDEOXYGLUCOSE F 18 12.22 MILLICURIE: 200 INJECTION, SOLUTION INTRAVENOUS at 10:07

## 2021-01-01 RX ADMIN — ALBUTEROL SULFATE 2 PUFF: 90 AEROSOL, METERED RESPIRATORY (INHALATION) at 08:21

## 2021-01-01 RX ADMIN — PREDNISONE 40 MG: 20 TABLET ORAL at 06:51

## 2021-01-01 RX ADMIN — FUROSEMIDE 20 MG: 20 TABLET ORAL at 09:52

## 2021-01-01 RX ADMIN — Medication 1 TABLET: at 09:58

## 2021-01-01 RX ADMIN — PREDNISONE 10 MG: 10 TABLET ORAL at 09:58

## 2021-01-01 RX ADMIN — Medication 1 TABLET: at 09:52

## 2021-01-01 RX ADMIN — Medication 2 PUFF: at 21:47

## 2021-01-01 RX ADMIN — AMLODIPINE BESYLATE 5 MG: 5 TABLET ORAL at 10:05

## 2021-01-01 RX ADMIN — SODIUM CHLORIDE, PRESERVATIVE FREE 10 ML: 5 INJECTION INTRAVENOUS at 09:53

## 2021-01-01 RX ADMIN — ENOXAPARIN SODIUM 40 MG: 40 INJECTION SUBCUTANEOUS at 09:52

## 2021-01-01 RX ADMIN — Medication 2 PUFF: at 08:21

## 2021-01-01 RX ADMIN — ENOXAPARIN SODIUM 40 MG: 40 INJECTION SUBCUTANEOUS at 09:59

## 2021-01-01 RX ADMIN — CLOPIDOGREL BISULFATE 75 MG: 75 TABLET ORAL at 09:48

## 2021-01-01 RX ADMIN — Medication 100 MG: at 09:54

## 2021-01-01 RX ADMIN — SODIUM CHLORIDE, PRESERVATIVE FREE 10 ML: 5 INJECTION INTRAVENOUS at 23:10

## 2021-01-01 RX ADMIN — MELATONIN TAB 3 MG 3 MG: 3 TAB at 23:20

## 2021-01-01 RX ADMIN — MAGNESIUM SULFATE 2000 MG: 2 INJECTION INTRAVENOUS at 12:21

## 2021-01-01 RX ADMIN — Medication 2 PUFF: at 15:29

## 2021-01-01 RX ADMIN — Medication 625 MG: at 09:53

## 2021-01-01 RX ADMIN — CLOPIDOGREL BISULFATE 75 MG: 75 TABLET ORAL at 09:52

## 2021-01-01 RX ADMIN — ATORVASTATIN CALCIUM 80 MG: 80 TABLET, FILM COATED ORAL at 22:00

## 2021-01-01 RX ADMIN — PREDNISONE 10 MG: 10 TABLET ORAL at 09:48

## 2021-01-01 RX ADMIN — FOLIC ACID 1 MG: 1 TABLET ORAL at 09:53

## 2021-01-01 RX ADMIN — PREDNISONE 40 MG: 20 TABLET ORAL at 03:01

## 2021-01-01 RX ADMIN — AMLODIPINE BESYLATE 5 MG: 5 TABLET ORAL at 09:52

## 2021-01-01 RX ADMIN — IPRATROPIUM BROMIDE 0.25 MG: 0.5 SOLUTION RESPIRATORY (INHALATION) at 04:02

## 2021-01-01 RX ADMIN — SODIUM CHLORIDE, PRESERVATIVE FREE 10 ML: 5 INJECTION INTRAVENOUS at 10:07

## 2021-01-01 RX ADMIN — FOLIC ACID 1 MG: 1 TABLET ORAL at 09:48

## 2021-01-01 RX ADMIN — Medication 2 PUFF: at 11:25

## 2021-01-01 RX ADMIN — PYRIDOXINE HCL TAB 50 MG 50 MG: 50 TAB at 09:52

## 2021-01-01 RX ADMIN — SODIUM CHLORIDE, PRESERVATIVE FREE 10 ML: 5 INJECTION INTRAVENOUS at 10:00

## 2021-01-01 RX ADMIN — ALBUTEROL SULFATE 2.5 MG: 2.5 SOLUTION RESPIRATORY (INHALATION) at 04:00

## 2021-01-01 RX ADMIN — ASPIRIN 325 MG ORAL TABLET 325 MG: 325 PILL ORAL at 09:48

## 2021-01-01 RX ADMIN — IOPAMIDOL 75 ML: 755 INJECTION, SOLUTION INTRAVENOUS at 03:32

## 2021-01-01 RX ADMIN — Medication 625 MG: at 09:58

## 2021-01-01 RX ADMIN — Medication 100 MG: at 09:58

## 2021-01-01 RX ADMIN — ASPIRIN 325 MG ORAL TABLET 325 MG: 325 PILL ORAL at 09:52

## 2021-01-01 RX ADMIN — DONEPEZIL HYDROCHLORIDE 10 MG: 10 TABLET, FILM COATED ORAL at 23:08

## 2021-01-01 RX ADMIN — Medication 2 PUFF: at 20:57

## 2021-01-01 RX ADMIN — ALBUTEROL SULFATE 2 PUFF: 90 AEROSOL, METERED RESPIRATORY (INHALATION) at 12:15

## 2021-01-01 RX ADMIN — Medication 2 PUFF: at 12:16

## 2021-01-01 RX ADMIN — POTASSIUM CHLORIDE 60 MEQ: 1500 TABLET, EXTENDED RELEASE ORAL at 10:06

## 2021-01-01 RX ADMIN — ALBUTEROL SULFATE 2 PUFF: 90 AEROSOL, METERED RESPIRATORY (INHALATION) at 15:29

## 2021-01-01 RX ADMIN — ALBUTEROL SULFATE 2 PUFF: 90 AEROSOL, METERED RESPIRATORY (INHALATION) at 04:23

## 2021-01-01 RX ADMIN — FUROSEMIDE 20 MG: 20 TABLET ORAL at 09:58

## 2021-01-01 RX ADMIN — ALBUTEROL SULFATE 2 PUFF: 90 AEROSOL, METERED RESPIRATORY (INHALATION) at 21:45

## 2021-01-01 RX ADMIN — ATORVASTATIN CALCIUM 80 MG: 80 TABLET, FILM COATED ORAL at 23:09

## 2021-01-01 RX ADMIN — PREDNISONE 10 MG: 10 TABLET ORAL at 09:53

## 2021-01-01 RX ADMIN — SODIUM CHLORIDE, PRESERVATIVE FREE 10 ML: 5 INJECTION INTRAVENOUS at 22:00

## 2021-01-01 RX ADMIN — Medication 625 MG: at 09:49

## 2021-01-01 RX ADMIN — SODIUM CHLORIDE, PRESERVATIVE FREE 5 ML: 5 INJECTION INTRAVENOUS at 10:28

## 2021-01-01 RX ADMIN — FUROSEMIDE 20 MG: 20 TABLET ORAL at 13:36

## 2021-01-01 RX ADMIN — CLOPIDOGREL BISULFATE 75 MG: 75 TABLET ORAL at 09:58

## 2021-01-01 RX ADMIN — ACETAMINOPHEN 650 MG: 325 TABLET ORAL at 23:20

## 2021-01-01 RX ADMIN — ALBUTEROL SULFATE 2 PUFF: 90 AEROSOL, METERED RESPIRATORY (INHALATION) at 11:25

## 2021-01-01 RX ADMIN — Medication 100 MG: at 09:48

## 2021-01-01 RX ADMIN — PYRIDOXINE HCL TAB 50 MG 50 MG: 50 TAB at 22:00

## 2021-01-01 RX ADMIN — FOLIC ACID 1 MG: 1 TABLET ORAL at 09:58

## 2021-01-01 RX ADMIN — DONEPEZIL HYDROCHLORIDE 10 MG: 10 TABLET, FILM COATED ORAL at 22:00

## 2021-01-01 RX ADMIN — FUROSEMIDE 20 MG: 10 INJECTION, SOLUTION INTRAVENOUS at 09:53

## 2021-01-01 RX ADMIN — ALBUTEROL SULFATE 2 PUFF: 90 AEROSOL, METERED RESPIRATORY (INHALATION) at 20:55

## 2021-01-01 RX ADMIN — ASPIRIN 325 MG ORAL TABLET 325 MG: 325 PILL ORAL at 09:58

## 2021-01-01 RX ADMIN — Medication 1 TABLET: at 09:49

## 2021-01-01 SDOH — ECONOMIC STABILITY: FOOD INSECURITY: WITHIN THE PAST 12 MONTHS, THE FOOD YOU BOUGHT JUST DIDN'T LAST AND YOU DIDN'T HAVE MONEY TO GET MORE.: NEVER TRUE

## 2021-01-01 SDOH — ECONOMIC STABILITY: FOOD INSECURITY: WITHIN THE PAST 12 MONTHS, YOU WORRIED THAT YOUR FOOD WOULD RUN OUT BEFORE YOU GOT MONEY TO BUY MORE.: NEVER TRUE

## 2021-01-01 ASSESSMENT — PATIENT HEALTH QUESTIONNAIRE - PHQ9
SUM OF ALL RESPONSES TO PHQ QUESTIONS 1-9: 0
SUM OF ALL RESPONSES TO PHQ9 QUESTIONS 1 & 2: 0
1. LITTLE INTEREST OR PLEASURE IN DOING THINGS: 0
SUM OF ALL RESPONSES TO PHQ QUESTIONS 1-9: 0
SUM OF ALL RESPONSES TO PHQ QUESTIONS 1-9: 0
DEPRESSION UNABLE TO ASSESS: FUNCTIONAL CAPACITY MOTIVATION LIMITS ACCURACY
2. FEELING DOWN, DEPRESSED OR HOPELESS: 0
1. LITTLE INTEREST OR PLEASURE IN DOING THINGS: 0
2. FEELING DOWN, DEPRESSED OR HOPELESS: 0
SUM OF ALL RESPONSES TO PHQ9 QUESTIONS 1 & 2: 0

## 2021-01-01 ASSESSMENT — LIFESTYLE VARIABLES
HOW OFTEN DURING THE LAST YEAR HAVE YOU HAD A FEELING OF GUILT OR REMORSE AFTER DRINKING: 0
HOW OFTEN DO YOU HAVE SIX OR MORE DRINKS ON ONE OCCASION: 0
HAVE YOU OR SOMEONE ELSE BEEN INJURED AS A RESULT OF YOUR DRINKING: 0
HOW OFTEN DURING THE LAST YEAR HAVE YOU BEEN UNABLE TO REMEMBER WHAT HAPPENED THE NIGHT BEFORE BECAUSE YOU HAD BEEN DRINKING: 0
HOW OFTEN DO YOU HAVE A DRINK CONTAINING ALCOHOL: 4
HOW OFTEN DURING THE LAST YEAR HAVE YOU NEEDED AN ALCOHOLIC DRINK FIRST THING IN THE MORNING TO GET YOURSELF GOING AFTER A NIGHT OF HEAVY DRINKING: 0
AUDIT TOTAL SCORE: 4
HOW OFTEN DURING THE LAST YEAR HAVE YOU FAILED TO DO WHAT WAS NORMALLY EXPECTED FROM YOU BECAUSE OF DRINKING: 0
HOW MANY STANDARD DRINKS CONTAINING ALCOHOL DO YOU HAVE ON A TYPICAL DAY: 0
HOW OFTEN DURING THE LAST YEAR HAVE YOU FOUND THAT YOU WERE NOT ABLE TO STOP DRINKING ONCE YOU HAD STARTED: 0
HAS A RELATIVE, FRIEND, DOCTOR, OR ANOTHER HEALTH PROFESSIONAL EXPRESSED CONCERN ABOUT YOUR DRINKING OR SUGGESTED YOU CUT DOWN: 0
AUDIT-C TOTAL SCORE: 4

## 2021-01-01 ASSESSMENT — ENCOUNTER SYMPTOMS
DIFFICULTY BREATHING: 1
COUGH: 1
SHORTNESS OF BREATH: 1
FREQUENT THROAT CLEARING: 1

## 2021-01-01 ASSESSMENT — SOCIAL DETERMINANTS OF HEALTH (SDOH): HOW HARD IS IT FOR YOU TO PAY FOR THE VERY BASICS LIKE FOOD, HOUSING, MEDICAL CARE, AND HEATING?: NOT HARD AT ALL

## 2021-01-01 ASSESSMENT — PAIN SCALES - GENERAL: PAINLEVEL_OUTOF10: 3

## 2021-01-01 ASSESSMENT — COPD QUESTIONNAIRES: COPD: 1

## 2021-02-09 ENCOUNTER — VIRTUAL VISIT (OUTPATIENT)
Dept: FAMILY MEDICINE CLINIC | Age: 81
End: 2021-02-09
Payer: COMMERCIAL

## 2021-02-09 DIAGNOSIS — Z85.3 HISTORY OF BREAST CANCER: ICD-10-CM

## 2021-02-09 DIAGNOSIS — F10.10 ALCOHOL ABUSE: ICD-10-CM

## 2021-02-09 DIAGNOSIS — R22.2 CHEST WALL MASS: ICD-10-CM

## 2021-02-09 DIAGNOSIS — J44.9 MODERATE COPD (CHRONIC OBSTRUCTIVE PULMONARY DISEASE) (HCC): ICD-10-CM

## 2021-02-09 DIAGNOSIS — F10.27 DEMENTIA ASSOCIATED WITH ALCOHOLISM WITHOUT BEHAVIORAL DISTURBANCE (HCC): Primary | ICD-10-CM

## 2021-02-09 PROCEDURE — 99214 OFFICE O/P EST MOD 30 MIN: CPT | Performed by: FAMILY MEDICINE

## 2021-02-09 RX ORDER — LANOLIN ALCOHOL/MO/W.PET/CERES
100 CREAM (GRAM) TOPICAL DAILY
Qty: 90 TABLET | Refills: 3 | Status: SHIPPED | OUTPATIENT
Start: 2021-02-09 | End: 2021-01-01

## 2021-02-09 RX ORDER — DONEPEZIL HYDROCHLORIDE 10 MG/1
10 TABLET, FILM COATED ORAL NIGHTLY
Qty: 90 TABLET | Refills: 3 | Status: SHIPPED | OUTPATIENT
Start: 2021-02-09 | End: 2022-01-01 | Stop reason: ALTCHOICE

## 2021-02-09 NOTE — PROGRESS NOTES
2021    TELEHEALTH EVALUATION -- Audio/Visual (During FJFOS-84 public health emergency)    HPI:    Chidi Nowak (:  1940) has requested an audio/video evaluation for the following concern(s):    Dementia: history is per caregiver. Salome Colon has memory problems and that she does not always remember what is being done or told. Taking the Aricept. Caregiver thinks she is getting worse.     Urinary and stool incontinence: Urine incontinence is because the patient cannot get to the bathroom quickly. Salome Colon is now also having stool incontinence.  Is not complete stool incontinence     Nicotine dependence: Patient did quit smoking a few weeks ago. Granddaughter would like to keep her on the patches for now.     Alcohol abuse: Patient is now up to about 4 beers per day.   She would like for her grandmother to continue taking the Naltrexone.     Malnutrition: granddaughter is trying to feed her more and patient is doing well with eating.     No more bloody noses since the last visit. Chest mass: right chest wall at the site of the mastectomy. Patient upset because she does not want to pursue treatment. COPD: stable with oxygen. Is mostly sedentary in her home  Review of Systems    Prior to Visit Medications    Medication Sig Taking?  Authorizing Provider   donepezil (ARICEPT) 10 MG tablet Take 1 tablet by mouth nightly Yes Sheila Ruano MD   thiamine 100 MG tablet Take 1 tablet by mouth daily Yes Sheila Ruano MD   Calcium Polycarbophil (FIBER) 625 MG TABS Take 1 tablet by mouth daily Yes Sheila Ruano MD   naltrexone (DEPADE) 50 MG tablet Take 1 tablet by mouth daily Yes Sheila Ruano MD   fluticasone propionate (FLOVENT) 250 MCG/BLIST AEPB inhaler Inhale 1 puff into the lungs daily Yes Sheila Ruano MD   ipratropium (ATROVENT HFA) 17 MCG/ACT inhaler Inhale 1 puff into the lungs 4 times daily Yes Sheila Ruano MD albuterol sulfate HFA (PROAIR HFA) 108 (90 Base) MCG/ACT inhaler Inhale 2 puffs into the lungs every 4 hours as needed for Wheezing or Shortness of Breath Yes Melissa Pickering MD   folic acid (FOLVITE) 1 MG tablet Take 1 tablet by mouth daily Yes Melissa Pickering MD   clopidogrel (PLAVIX) 75 MG tablet Take 1 tablet by mouth daily Yes Melissa Pickering MD   atorvastatin (LIPITOR) 80 MG tablet Take 1 tablet by mouth daily Yes Melissa Pickering MD   albuterol (PROVENTIL) (2.5 MG/3ML) 0.083% nebulizer solution Take 3 mLs by nebulization every 6 hours as needed for Wheezing or Shortness of Breath Yes Melissa Pickering MD   vitamin B-1 (THIAMINE) 100 MG tablet Take 1 tablet by mouth daily Yes Melissa Pickering MD   calcium carbonate-vitamin D (CALCIUM 600 + D) 600-400 MG-UNIT TABS per tab Twice daily by mouth  Patient taking differently: 1 tablet Once a daily by mouth Yes Melissa Pickering MD   aspirin 325 MG tablet Take 325 mg by mouth every morning. Over The Counter , Last Dose Taken 11-9-12 Due To Scheduled Procedure Yes Historical Provider, MD   OXYGEN by Nasal route nightly.  Oxygen 2 liters per minute Yes Historical Provider, MD   Multiple Vitamins-Minerals (THERAPEUTIC MULTIVITAMIN-MINERALS) tablet Take 1 tablet by mouth daily  Patient not taking: Reported on 2/9/2021  Melissa Pickering MD   Incontinence Supply Disposable MISC Change 4 times per day  MD Yonathan Amezquita [facility]) 600 MG extended release tablet Take 1 tablet by mouth 2 times daily  Patient not taking: Reported on 11/9/2020  Hipolito Abernathy MD   Nebulizers (MINI PLUS NEBULIZER) MISC Use 4 times per day as needed  Cheryl Leisure, MD [place] TO [address] Chair  Patient not taking: Reported on 11/9/2020  Melissa Pickering MD   Spacer/Aero Chamber Mouthpiece [address] 1 each by Does not apply route once as needed (SOB)  Melissa Pickering MD   UNABLE TO FIND Shingles vaccine  Patient not taking: Reported on 11/9/2020  Melissa Pickering MD       Social History     Tobacco Use  Smoking status: Former Smoker     Packs/day: 1.00     Years: 61.00     Pack years: 61.00     Types: Cigarettes     Quit date: 2020     Years since quittin.3    Smokeless tobacco: Never Used   Substance Use Topics    Alcohol use: Yes     Alcohol/week: 0.0 standard drinks     Comment: \"Beer Every Day\"    Drug use: No        Allergies   Allergen Reactions    Pcn [Penicillins] Other (See Comments)     \"Been so long ago, that I forgot. \"    Thiazide-Type Diuretics      low sodium   ,   Past Medical History:   Diagnosis Date    AAA (abdominal aortic aneurysm) (HCC)     Alcohol abuse     4 beers daily    Alcohol-induced persisting dementia (HCC)     Arthritis     Asthma     COPD (chronic obstructive pulmonary disease) (HCC)     History of breast cancer Dx     Lumpectomy Right Breast For Cancer, Had Radiation Treatments    Hx of blood clots     HX OTHER MEDICAL     Primary Care Physician Is Dr. Winter Nguyen    Hyperlipidemia     high HDL    Hypertension     Iliac artery aneurysm, bilateral ([address]     On home oxygen therapy     At Night 2 Liters Per Nasal Cannula    Osteopenia     Peripheral vascular disease ([address] Dx in     Presbyopia 12/10/2019       PHYSICAL EXAMINATION:  [ INSTRUCTIONS:  \"[x]\" Indicates a positive item  \"[]\" Indicates a negative item  -- DELETE ALL ITEMS NOT EXAMINED]  Vital Signs: (As obtained by patient/caregiver or practitioner observation)    Blood pressure-  Heart rate-    Respiratory rate-    Temperature-  Pulse oximetry-     Constitutional: [x] Appears well-developed and well-nourished [x] No apparent distress      [] Abnormal-   Mental status  [x] Alert and awake  [x] Oriented to person/place/time [x]Able to follow commands  Caregiver (granddaughter is doing most of the talking)    Eyes:  EOM    [x]  Normal  [] Abnormal-  Sclera  []  Normal  [] Abnormal -         Discharge []  None visible  [] Abnormal -    HENT: [x] Normocephalic, atraumatic. [] Abnormal   [] Mouth/Throat: Mucous membranes are moist.     External Ears [x] Normal  [] Abnormal-     Neck: [x] No visualized mass     Pulmonary/Chest: [x] Respiratory effort normal.  [] No visualized signs of difficulty breathing or respiratory distress        [x] Abnormal- video quality poor, but appears that has marble sized mass right chest wall   Musculoskeletal:   [] Normal gait with no signs of ataxia         [x] Normal range of motion of neck        [] Abnormal-       Neurological:        [x] No Facial Asymmetry (Cranial nerve 7 motor function) (limited exam to video visit)          [x] No gaze palsy        [] Abnormal-         Skin:        [x] No significant exanthematous lesions or discoloration noted on facial skin         [] Abnormal-            Psychiatric:       [x] Normal Affect [x] No Hallucinations        [] Abnormal-     Other pertinent observable physical exam findings-        Diagnosis Orders   1. Dementia associated with alcoholism without behavioral disturbance ([address]  donepezil (ARICEPT) 10 MG tablet   2. History of breast cancer  XR CHEST (2 VW)   3. Chest wall mass  XR CHEST (2 VW)   4. Moderate COPD (chronic obstructive pulmonary disease) ([address]     5. Alcohol abuse  thiamine 100 MG tablet       Increasing the dose of the Aricept, as the dementia is worsening. Continue the Naltrexone for the alcohol abuse. Recommend she cut down to 2 beers per day ---caregiver is the one purchasing the beer. Will respect patient's wishes, but could check out the mass with xray for re-assurance          Return in about 3 months (around 5/6/2021) for In office 9:30 Dementia, COPD, chest mass, alcohol abuse. Donald Bond is a 78 y.o. female being evaluated by a Virtual Visit (video visit) encounter to address concerns as mentioned above. A caregiver was present when appropriate. Due to this being a TeleHealth encounter (During XMFIW-52 public health emergency), evaluation of the following organ systems was limited: Vitals/Constitutional/EENT/Resp/CV/GI//MS/Neuro/Skin/Heme-Lymph-Imm. Pursuant to the emergency declaration under the [address] [place] [address] and the [employer] and [employer] Act, this Virtual Visit was conducted with patient's (and/or legal guardian's) consent, to reduce the patient's risk of exposure to COVID-19 and provide necessary medical care. The patient (and/or legal guardian) has also been advised to contact this office for worsening conditions or problems, and seek emergency medical treatment and/or call 911 if deemed necessary. Patient identification was verified at the start of the visit: Yes    Total time spent on this encounter: Not billed by time    Services were provided through a video synchronous discussion virtually to substitute for in-person clinic visit. Patient and provider were located at their individual homes. --Bonnie Arias MD on 2/9/2021 at 3:31 PM    An electronic signature was used to authenticate this note.

## 2021-02-09 NOTE — PATIENT INSTRUCTIONS
PLEASE BRING YOUR MEDICATIONS TO ALL APPOINTMENTS    The diagnoses and medications listed in this after visit summary may not be accurate at the time of check out. Please check MY CHART in 28-48 hours for possible corrections. Late cancellation policy: So that we can better accommodate people who are sick, please give our office 24 hour notice for an appointment cancellation. Thank you. Missed appointments: Your care is very important to us. It is important that you keep your scheduled appointments. Multiple missed appointments will lead to a dismissal from the office. Later arrival policy: If you are more than 10 minutes late for your appointment, you will be asked to reschedule. Please allow 5-7 business days for paperwork to be processed. It is important that you check your MY Chart messages, as they include appointment reminders, test results, and other important information. If you have forgotten your password, please call [phone]. HERE ARE SOME LIFE CHANGING TIPS      1. Take your blood pressure medications at night. This reduces your chance of cardiovascular event by half  2. Fever in kids: It's best to give both Tylenol and Ibuprofen at the same time rather than staggering them which is confusing  3. Follow these tips to reduce childhood obesity: Reduce unnecessary exposure to antibiotics, consume whole milk instead of skim milk, watch public TV instead of regular TV (less exposure to junk food commercials), and reduce traumatic experiences. 4. 1 egg per day is good for your heart  5. Alternate day fasting does promote weight loss. 6. Skipping breakfast increases your risk of obesity  7. Artificially sweetened drinks increase all cause mortality (strokes, BMI, cardiovascular)  8. Kale consumption can reduce onset of dementia  9. Walking at least 8000 steps per day and resistance exercise 2-3 x per week are good for your heart  10.  Covid 19: Wearing gloves is not that helpful  Having low vitamin D levels increases risk of infection (take 2-4000 units of D3 per day until our covid crisis is resolved)  11.  Brushing teeth 3 times per day can decrease chance of getting diabetes

## 2021-02-12 DIAGNOSIS — J44.9 MODERATE COPD (CHRONIC OBSTRUCTIVE PULMONARY DISEASE) (HCC): ICD-10-CM

## 2021-02-16 RX ORDER — ALBUTEROL SULFATE 2.5 MG/3ML
SOLUTION RESPIRATORY (INHALATION)
Qty: 360 ML | Refills: 3 | Status: SHIPPED | OUTPATIENT
Start: 2021-02-16 | End: 2021-01-01 | Stop reason: SDUPTHER

## 2021-04-13 ENCOUNTER — TELEPHONE (OUTPATIENT)
Dept: FAMILY MEDICINE CLINIC | Age: 81
End: 2021-04-13

## 2021-04-13 DIAGNOSIS — F10.10 ALCOHOL ABUSE: ICD-10-CM

## 2021-04-13 RX ORDER — NALTREXONE HYDROCHLORIDE 50 MG/1
50 TABLET, FILM COATED ORAL DAILY
Qty: 30 TABLET | Refills: 0 | Status: SHIPPED | OUTPATIENT
Start: 2021-04-13 | End: 2021-04-20 | Stop reason: SDUPTHER

## 2021-04-13 RX ORDER — NALTREXONE HYDROCHLORIDE 50 MG/1
TABLET, FILM COATED ORAL
Qty: 30 TABLET | Refills: 1 | OUTPATIENT
Start: 2021-04-13

## 2021-04-13 NOTE — TELEPHONE ENCOUNTER
Left message on Adria's voicemail letting her know that medication was sent to [facility].

## 2021-04-20 ENCOUNTER — TELEPHONE (OUTPATIENT)
Dept: FAMILY MEDICINE CLINIC | Age: 81
End: 2021-04-20

## 2021-04-20 DIAGNOSIS — F10.10 ALCOHOL ABUSE: ICD-10-CM

## 2021-04-20 RX ORDER — NALTREXONE HYDROCHLORIDE 50 MG/1
50 TABLET, FILM COATED ORAL DAILY
Qty: 90 TABLET | Refills: 0 | Status: SHIPPED | OUTPATIENT
Start: 2021-04-20 | End: 2021-01-01

## 2021-04-20 NOTE — TELEPHONE ENCOUNTER
Patient is using [facility] mail order and no longer using [employer]. Please send script for naltrexone 50mg to [facility].  [facility] updated

## 2021-04-20 NOTE — TELEPHONE ENCOUNTER
LM on VM that medication was sent to [facility] and to call the office with any other issues or concerns.

## 2021-05-06 ENCOUNTER — OFFICE VISIT (OUTPATIENT)
Dept: FAMILY MEDICINE CLINIC | Age: 81
End: 2021-05-06
Payer: COMMERCIAL

## 2021-05-06 VITALS
HEART RATE: 81 BPM | DIASTOLIC BLOOD PRESSURE: 70 MMHG | TEMPERATURE: 97.1 F | SYSTOLIC BLOOD PRESSURE: 110 MMHG | BODY MASS INDEX: 17.65 KG/M2 | OXYGEN SATURATION: 90 % | WEIGHT: 102.85 LBS

## 2021-05-06 DIAGNOSIS — R39.81 FUNCTIONAL URINARY INCONTINENCE: ICD-10-CM

## 2021-05-06 DIAGNOSIS — Z85.3 HISTORY OF BREAST CANCER: ICD-10-CM

## 2021-05-06 DIAGNOSIS — R22.2 MASS OF RIGHT CHEST WALL: ICD-10-CM

## 2021-05-06 DIAGNOSIS — F10.10 ALCOHOL ABUSE: ICD-10-CM

## 2021-05-06 DIAGNOSIS — J44.9 MODERATE COPD (CHRONIC OBSTRUCTIVE PULMONARY DISEASE) (HCC): ICD-10-CM

## 2021-05-06 DIAGNOSIS — F10.27 DEMENTIA ASSOCIATED WITH ALCOHOLISM WITHOUT BEHAVIORAL DISTURBANCE (HCC): Primary | ICD-10-CM

## 2021-05-06 PROBLEM — J44.1 COPD WITH ACUTE EXACERBATION (HCC): Status: RESOLVED | Noted: 2020-09-30 | Resolved: 2021-05-06

## 2021-05-06 PROCEDURE — 1090F PRES/ABSN URINE INCON ASSESS: CPT | Performed by: FAMILY MEDICINE

## 2021-05-06 PROCEDURE — 1036F TOBACCO NON-USER: CPT | Performed by: FAMILY MEDICINE

## 2021-05-06 PROCEDURE — 1123F ACP DISCUSS/DSCN MKR DOCD: CPT | Performed by: FAMILY MEDICINE

## 2021-05-06 PROCEDURE — G8427 DOCREV CUR MEDS BY ELIG CLIN: HCPCS | Performed by: FAMILY MEDICINE

## 2021-05-06 PROCEDURE — G8926 SPIRO NO PERF OR DOC: HCPCS | Performed by: FAMILY MEDICINE

## 2021-05-06 PROCEDURE — G8419 CALC BMI OUT NRM PARAM NOF/U: HCPCS | Performed by: FAMILY MEDICINE

## 2021-05-06 PROCEDURE — 4040F PNEUMOC VAC/ADMIN/RCVD: CPT | Performed by: FAMILY MEDICINE

## 2021-05-06 PROCEDURE — 3023F SPIROM DOC REV: CPT | Performed by: FAMILY MEDICINE

## 2021-05-06 PROCEDURE — G8399 PT W/DXA RESULTS DOCUMENT: HCPCS | Performed by: FAMILY MEDICINE

## 2021-05-06 PROCEDURE — 99214 OFFICE O/P EST MOD 30 MIN: CPT | Performed by: FAMILY MEDICINE

## 2021-05-06 ASSESSMENT — ENCOUNTER SYMPTOMS: SHORTNESS OF BREATH: 1

## 2021-05-06 ASSESSMENT — COPD QUESTIONNAIRES: COPD: 1

## 2021-05-06 NOTE — PATIENT INSTRUCTIONS
8. Walking at least 8000 steps per day and resistance exercise 2-3 x per week are good for your heart  9.  Brushing teeth 3 times per day can decrease chance of getting diabetes

## 2021-05-06 NOTE — PROGRESS NOTES
Patient ID: Megan Renae 1940    . Chief Complaint   Patient presents with    COPD    Dementia    Other     chest mass     Other     alcohol abuse         COPD  She complains of shortness of breath. This is a chronic problem. The current episode started more than 1 year ago. The problem has been unchanged. Associated symptoms include appetite change. Pertinent negatives include no chest pain. Her symptoms are aggravated by strenuous activity, URI and emotional stress. Her symptoms are alleviated by rest (oxygen. ). Risk factors for lung disease include smoking/tobacco exposure. Her past medical history is significant for COPD. Hyperlipidemia  This is a chronic problem. The current episode started more than 1 year ago. Associated symptoms include shortness of breath. Pertinent negatives include no chest pain. Current antihyperlipidemic treatment includes statins. There are no compliance problems. Risk factors for coronary artery disease include a sedentary lifestyle. Dementia: history is per caregiver.  She has memory problems and that she does not always remember what is being done or told. Taking the Aricept. Caregiver thinks she is getting worse.     Urinary and stool incontinence: Urine incontinence is because the patient cannot get to the bathroom quickly. Toña Mcginnis is now also having stool incontinence.  Is not complete stool incontinence       Alcohol abuse: Patient is now down to about 3-4 beers per day. Franki Lieberman would like for her grandmother to continue taking the Naltrexone.     Malnutrition: granddaughter is trying to feed her more and patient is doing well with eating.          Chest mass: right chest wall at the site of the mastectomy. Patient upset because she does not want to pursue treatment.          Review of Systems   Constitutional: Positive for appetite change. Respiratory: Positive for shortness of breath. Cardiovascular: Negative for chest pain.        Patient Active Problem List   Diagnosis    Peripheral vascular disease ([facility]    Hyperlipidemia    Alcohol abuse    Moderate COPD (chronic obstructive pulmonary disease) ([facility]    Osteoporosis    History of breast cancer    Cachectic ([facility]    Bilateral hearing loss    Functional urinary incontinence    Unsteady gait       Past Surgical History:   Procedure Laterality Date    ABDOMEN SURGERY      \"Surgery For Adhesions\"    ABDOMINAL AORTIC ANEURYSM REPAIR, ENDOVASCULAR  2012    BREAST BIOPSY  2013    benign    BREAST LUMPECTOMY      Lumpectomy Right Breast For Cancer, Had Radiation  Treatments    [facility]  [facility]    DENTAL SURGERY      Teeth Extracted In Past    FEMORAL BYPASS      \"Both Legs\"    FRACTURE SURGERY  Early     Broken Left Arm    HYSTERECTOMY, TOTAL ABDOMINAL         Family History   Problem Relation Age of Onset    Colon Cancer Father     Coronary Art Dis Sister     Heart Disease Sister     High Blood Pressure Sister     High Cholesterol Sister     Coronary Art Dis Brother     Heart Attack Brother          age 76    Heart Disease Brother     Early Death Brother     Other Sister         \"Both Legs Amputated\"    Kidney Disease Sister         \"Kidney Problems\"    Early Death Brother         Suicide- hung himself    Early Death Brother         Murdered in his 42's    Early Death Son 1         in a fire    Early Death Mother 61    Vision Loss Sister         \"Eye Problems\"    Other Daughter         \"Lung Problems\"       Current Outpatient Medications on File Prior to Visit   Medication Sig Dispense Refill    naltrexone (DEPADE) 50 MG tablet Take 1 tablet by mouth daily 90 tablet 0    albuterol (PROVENTIL) (2.5 MG/3ML) 0.083% nebulizer solution INHALE CONTENTS OF 1 VIAL VIA NEBULIZER EVERY 6 HOURS AS NEEDED FOR SHORTNESS OF BREATH OR WHEEZING 360 mL 3    donepezil (ARICEPT) 10 MG tablet Take 1 tablet by mouth nightly 90 tablet 3     Atrial Rate 10/28/2020 82     P-R Interval 10/28/2020 172     QRS Duration 10/28/2020 66     Q-T Interval 10/28/2020 364     QTc Calculation (Bazett) 10/28/2020 425     P Axis 10/28/2020 88     R Axis 10/28/2020 60     T Axis 10/28/2020 50     Diagnosis 10/28/2020                      Value:Sinus rhythm with premature atrial complexes  Otherwise normal ECG  When compared with ECG of 01-OCT-2020 07:05,  premature atrial complexes are now present  T wave inversion no longer evident in Inferior leads  T wave inversion now evident in Anterior leads  Confirmed by [facility] Nick SCOTT ([id number]) on 11/24/2020 10:29:23 AM      WBC 10/28/2020 3.6*    RBC 10/28/2020 4.24     Hemoglobin 10/28/2020 13.5     Hematocrit 10/28/2020 41.3     MCV 10/28/2020 97.4     MCH 10/28/2020 31.8*    MCHC 10/28/2020 32.7     RDW 10/28/2020 14.8     Platelets 25/90/0054 243     MPV 10/28/2020 9.1     Differential Type 10/28/2020 AUTOMATED DIFFERENTIAL     Segs Relative 10/28/2020 40.3     Lymphocytes % 10/28/2020 34.6     Monocytes % 10/28/2020 18.6*    Eosinophils % 10/28/2020 5.6*    Basophils % 10/28/2020 0.6     Segs Absolute 10/28/2020 1.4     Lymphocytes Absolute 10/28/2020 1.2     Monocytes Absolute 10/28/2020 0.7     Eosinophils Absolute 10/28/2020 0.2     Basophils Absolute 10/28/2020 0.0     Nucleated RBC % 10/28/2020 0.0     Total Nucleated RBC 10/28/2020 0.0     Total Immature Neutrophil 10/28/2020 0.01     Immature Neutrophil % 10/28/2020 0.3     Sodium 10/28/2020 139     Potassium 10/28/2020 4.9     Chloride 10/28/2020 99     CO2 10/28/2020 31     BUN 10/28/2020 9     CREATININE 10/28/2020 0.8     Glucose 10/28/2020 47*    Calcium 10/28/2020 9.0     Albumin 10/28/2020 3.8     Total Protein 10/28/2020 6.2*    Total Bilirubin 10/28/2020 0.3     ALT 10/28/2020 10     AST 10/28/2020 17     Alkaline Phosphatase 10/28/2020 93     GFR Non- 10/28/2020 >60     GFR  American 10/28/2020 >60     Anion Gap 10/28/2020 9     Troponin T 10/28/2020 <0.010     Pro-BNP 10/28/2020 185.6     Lactate 10/28/2020 1.5     pH, [place] 10/28/2020 7.31*    pCO2, [place]on 10/28/2020 68*    pO2, [place] 10/28/2020 37     Base Exc, Mixed 10/28/2020 5.6*    HCO3, Venous 10/28/2020 34.2*    O2 Sat, [place]on 10/28/2020 67.9     Comment 10/28/2020 VBG     Lipase 10/28/2020 367*    POC Glucose 10/28/2020 117*           Assessment:       Diagnosis Orders   1. Dementia associated with alcoholism without behavioral disturbance ([facility]     2. Alcohol abuse     3. Moderate COPD (chronic obstructive pulmonary disease) ([facility]     4. Functional urinary incontinence     5. History of breast cancer     6. Mass of right chest wall             Plan:      I have encouraged granddaughter again to cut back on giving beer to the patient. Granddaughter is the enabler    Dementia seems to be progressing. COPD stable with oxygen. Continue the oxygen in the inhaled medications    Recheck in 6 months. Encourage patient and caregiver to get vaccinated against Covid. No further work-up of the chest mass per the patient's wishes. Return in about 6 months (around 11/6/2021) for Dementia, COPD, Hyperlipid.

## 2021-08-16 NOTE — ED NOTES
Pt ambulated from the ed with a steady gait. Pt was educated on home care and medications. Pt denies any questions at this time.      Cristi Wyatt RN  08/16/21 1684

## 2021-08-16 NOTE — ED TRIAGE NOTES
Pt to ED by EMS with complaints of shortness of breath. Pt has a hx of COPD and wears 2L NC at home baseline. Pt's granddaughter increased O2 to 3L at home. Pt exposed to mktg.

## 2021-08-16 NOTE — ED PROVIDER NOTES
EMERGENCY DEPARTMENT ENCOUNTER    Patient: Santa Martinez  MRN: [id number]  : 1940  Date of Evaluation: 2021  ED Provider:  Suma Rader MD    CHIEF COMPLAINT  Chief Complaint   Patient presents with    Shortness of Breath     Possible COVID exposure       HPI  Santa Martinez is a 78 y.o. female with history of COPD. Who presents with complaints of shortness of breath with nonproductive cough and wheezing with chest tightness but no overt chest pain with onset earlier tonight. Symptoms moderate in severity and constant with no known trigger modifying factors. Reports a possible remote exposure to someone with Covid. She denies fever or any additional associated symptoms or complaints or concerns.           REVIEW OF SYSTEMS    Constitutional: negative for fever, chills  Neurological: negative for HA, focal weakness, loss of sensation  Ophthalmic: negative for vision change  ENT: negative for congestion, rhinorrhea  Cardiovascular: negative for chest pain, palpitations, edema  Respiratory: negative for sputum  GI: negative for abdominal pain, nausea, vomiting, diarrhea, constipation  : negative for dysuria, hematuria  Musculoskeletal: negative for myalgias, decreased ROM, joint swelling  Dermatological: negative for rash, wounds  Heme: Negative for bleeding, bruising      PAST MEDICAL HISTORY  Past Medical History:   Diagnosis Date    AAA (abdominal aortic aneurysm) (HCC)     Alcohol abuse     4 beers daily    Alcohol-induced persisting dementia (HCC)     Arthritis     Asthma     COPD (chronic obstructive pulmonary disease) ([address]     History of breast cancer Dx 12-    Lumpectomy Right Breast For Cancer, Had Radiation Treatments    Hx of blood clots     HX OTHER MEDICAL     Primary Care Physician Is Dr. Nehemias Martinez    Hyperlipidemia     high HDL    Hypertension     Iliac artery aneurysm, bilateral ([address]     On home oxygen therapy     At Night 2 Liters Per Nasal Cannula    Osteopenia  Peripheral vascular disease ([facility] Dx in     Presbyopia 12/10/2019       CURRENT MEDICATIONS  [unfilled]    ALLERGIES  Allergies   Allergen Reactions    Pcn [Penicillins] Other (See Comments)     \"Been so long ago, that I forgot. \"    Thiazide-Type Diuretics      low sodium       SURGICAL HISTORY  Past Surgical History:   Procedure Laterality Date    ABDOMEN SURGERY      \"Surgery For Adhesions\"    ABDOMINAL AORTIC ANEURYSM REPAIR, ENDOVASCULAR  2012    BREAST BIOPSY  2013    benign    BREAST LUMPECTOMY      Lumpectomy Right Breast For Cancer, Had Radiation  Treatments    COLONOSCOPY  ,     [place]    DENTAL SURGERY      Teeth Extracted In Past    FEMORAL BYPASS      \"Both Legs\"    FRACTURE SURGERY  Early     Broken Left Arm    HYSTERECTOMY, TOTAL ABDOMINAL         FAMILY HISTORY  Family History   Problem Relation Age of Onset    Colon Cancer Father     Coronary Art Dis Sister     Heart Disease Sister     High Blood Pressure Sister     High Cholesterol Sister     Coronary Art Dis Brother     Heart Attack Brother          age 76    Heart Disease Brother     Early Death Brother     Other Sister         \"Both Legs Amputated\"    Kidney Disease Sister         \"Kidney Problems\"    Early Death Brother         Suicide- hung himself    Early Death Brother         Murdered in his 42's    Early Death Son 1         in a fire    Early Death Mother 61    Vision Loss Sister         \"Eye Problems\"    Other Daughter         \"Lung Problems\"       SOCIAL HISTORY  Social History     Socioeconomic History    Marital status:      Spouse name: Not on file    Number of children: Not on file    Years of education: Not on file    Highest education level: Not on file   Occupational History    Not on file   Tobacco Use    Smoking status: Former Smoker     Packs/day: 1.00     Years: 61.00     Pack years: 61.00     Types: Cigarettes     Quit date: 2020     Years since quittin.8    Smokeless tobacco: Never Used   Substance and Sexual Activity    Alcohol use: Yes     Alcohol/week: 0.0 standard drinks     Comment: \"Beer Every Day\"    Drug use: No    Sexual activity: Never   Other Topics Concern    Not on file   Social History Narrative    Not on file     Social Determinants of Health     Financial Resource Strain:     Difficulty of Paying Living Expenses:    Food Insecurity:     Worried About Running Out of Food in the Last Year:     [address] in the Last Year:    Transportation Needs:     Lack of Transportation (Medical):  Lack of Transportation (Non-Medical):    Physical Activity:     Days of Exercise per Week:     Minutes of Exercise per Session:    Stress:     Feeling of Stress :    Social Connections:     Frequency of Communication with Friends and Family:     Frequency of Social Gatherings with Friends and Family:     Attends Jehov[employer] witness Services:     Active Member of Clubs or Organizations:     Attends Club or Organization Meetings:     Marital Status:    Intimate Partner Violence:     Fear of Current or Ex-Partner:     Emotionally Abused:     Physically Abused:     Sexually Abused:          **Past medical, family and social histories, and nursing notes reviewed and verified by me**      PHYSICAL EXAM  VITAL SIGNS:   ED Triage Vitals   Enc Vitals Group       (!) 148/77      Pulse 21 88      Resp 21 24      Temp 21 98.8 °F (37.1 °C)      Temp src --       SpO2 21 100 %      Weight 21 102 lb (46.3 kg)      Height 21 5' 4\" (1.626 m)      Head Circumference --       Peak Flow --       Pain Score --       Pain Loc --       Pain Edu? --       Excl. in [address] during ED course were reviewed and are as charted.     Constitutional: Minimal distress, Non-toxic appearance  Eyes: Conjunctiva normal, No discharge  HENT: Normocephalic, Atraumatic, posterior oropharynx is nonerythematous and without exudate, uvula is midline, oropharynx moist  Neck: Supple, no stridor, no grossly visible or palpable masses  Cardiovascular: Regular rate and rhythm, No murmurs, No rubs, No gallops, no JVD  Pulmonary/Chest: Diminished breath sounds bilaterally, otherwise no respiratory distress or accessory muscle use, No wheezing, crackles or rhonchi. Abdomen: Soft, nondistended and nonrigid, No tenderness or peritoneal signs, No masses, normal bowel sounds  Back: No midline point tenderness, No paraspinous muscle tenderness.  No CVA tenderness  Extremities: No gross deformities, no edema, no tenderness  Neurologic: Normal motor function, Normal sensory function, No focal deficits  Skin: Warm, Dry, No erythema, No rash, No cyanosis, No mottling  Lymphatic: No lymphadenopathy in the following location(s): cervical  Psychiatric: Alert and oriented x3, Affect normal          EKG Interpretation    Interpreted by emergency department physician    Rhythm: normal sinus   Rate: normal  Axis: normal  Ectopy: premature ventricular contractions (infrequent) and premature atrial contraction  Conduction: normal  ST Segments: normal  T Waves: normal  Q Waves: none    Clinical Impression: Sinus rhythm with occasional PVCs and PACs with possible left ventricular hypertrophy        RADIOLOGY/PROCEDURES/LABS/MEDICATIONS ADMINISTERED:    I have reviewed and interpreted all of the currently available lab results from this visit (if applicable):  Results for orders placed or performed during the hospital encounter of 08/16/21   COVID-19, Rapid    Specimen: Nasopharyngeal   Result Value Ref Range    Source THROAT     SARS-CoV-2, NAAT NOT DETECTED NOT DETECTED   CBC Auto Differential   Result Value Ref Range    WBC 5.0 4.0 - 10.5 K/CU MM    RBC 3.15 (L) 4.2 - 5.4 M/CU MM    Hemoglobin 10.1 (L) 12.5 - 16.0 GM/DL    Hematocrit 31.7 (L) 37 - 47 %    .6 (H) 78 - 100 FL components:    Glucose 114 (*)     All other components within normal limits   COVID-19, RAPID   TROPONIN   BRAIN NATRIURETIC PEPTIDE         IMAGING STUDIES ORDERED:  XR CHEST PORTABLE    I have personally viewed the imaging studies. The radiologist interpretation is:   XR CHEST PORTABLE   Preliminary Result   1. No acute cardiopulmonary disease. 2. COPD. MEDICATIONS ADMINISTERED:  Medications   albuterol sulfate  (90 Base) MCG/ACT inhaler 2 puff (2 puffs Inhalation Given 8/16/21 0423)   predniSONE (DELTASONE) tablet 40 mg (40 mg Oral Given 8/16/21 0301)         COURSE & MEDICAL DECISION MAKING  Last vitals: /75   Pulse 80   Temp 98.8 °F (37.1 °C)   Resp 18   Ht 5' 4\" (1.626 m)   Wt 102 lb (46.3 kg)   LMP  (LMP Unknown)   SpO2 99%   BMI 17.51 kg/m²     78year-old female chest tightness shortness of breath with cough and wheezing. Most likely COPD exacerbation. Was noted to have diminished lung sounds on auscultation initially but after breathing treatment and steroids has had improved aeration. No radiographic evidence for pneumonia. No evidence to suggest acute coronary syndrome. Differential diagnoses considered included, but were not limited to, acute bronchospasm, allergic reaction/anaphylaxis, acute respiratory infection, pneumonia, acute coronary syndrome, dysrhythmia, congestive heart failure, non-CHF-related pulmonary edema, pneumothorax, pulmonary embolism, and anemia. Additional workup and treatment in the ED as documented above. Patient reassured and will be discharged home. I have explained to the patient in appropriate terminology our work-up in the ED and their diagnosis. I have also given anticipatory guidance and expectant management of their condition as an outpatient as per my custom. The patient was given clear discharge and follow-up instructions including return to the ER immediately for worsening concerns.  The patient has been advised to follow-up with their primary care physician and/or referred physician in the next two to three days or sooner if worsening and to return to the ER immediately as above with any concerns. I provided the patient counseling with regard to my customary list of strict return precautions as well as return precautions specific to the cause for today's emergency department visit. The patient will return under these provided conditions, but should also return for new concerns or further worsening. Pt and/or family understand and agree with plan. Clinical Impression:  1. COPD exacerbation ([address]        Disposition referral (if applicable):  MD Dayday Vazquez [address] [place] [address] 372  [phone]    Schedule an appointment as soon as possible for a visit       [facility]  [place] [id number] [phone]    If symptoms worsen      Disposition medications (if applicable):  Discharge Medication List as of 8/16/2021  5:59 AM      START taking these medications    Details   predniSONE (DELTASONE) 50 MG tablet Take 1 tablet by mouth daily for 5 days, Disp-5 tablet, R-0Normal             ED Provider Disposition Time  DISPOSITION            Electronically signed by: Last Crowder M.D., 8/16/2021 6:23 AM      This dictation was created with voice recognition software. While attempts have been made to review the dictation as it is transcribed, on occasion the spoken word can be misinterpreted by the technology leading to omissions or inappropriate words, phrases or sentences.         Kaycee Corado MD  08/16/21 9809

## 2021-08-16 NOTE — ED NOTES
Bed: ED-17  Expected date:   Expected time:   Means of arrival:   Comments:  EMS      Heydi Chang RN  08/16/21 5219

## 2021-08-20 NOTE — TELEPHONE ENCOUNTER
[facility] Transitions Initial Follow Up Call    Outreach made within 2 business days of discharge: No    Patient: Simona Coto Patient : 1940   MRN: [id number]  Reason for Admission: There are no discharge diagnoses documented for the most recent discharge. Discharge Date: 21       Spoke with:  Salena Lombardo    Discharge department/facility: [facility]    TCM Interactive Patient Contact:  Was patient able to fill all prescriptions: Yes  Was patient instructed to bring all medications to the follow-up visit: Yes  Is patient taking all medications as directed in the discharge summary? Yes  Does patient understand their discharge instructions: Yes    Does patient have questions or concerns that need addressed prior to 7-14 day follow up office visit: YES     Patient is having difficulty with breathing and sleeping at night. Patient this morning was gasping for breath and calling her help. Patients care taker upped patients O2 to 3 liters. Baseline is 2 Liters. Patients O2 when squad came was 76. Patients baseline O2 is 85. Patient was given a breathing treatment and now resting. Patient is having breathing treatments every 6 hrs. In the morning patient has a lot of mucus, congestion and is gray and diaphoretic. Patients care taker is scared and worried and wants to know what else can be done. Patient has 2 days of steroids left. Caretaker is unable to bring in patient today because of an appointment for her daughter. Caretaker scheduled an appointment for patient on 21.   Please Advise    Scheduled appointment with PCP within 7-14 days    Follow Up  Future Appointments   Date Time Provider Nory Zaldivar   2021 11:15 AM MD NEY Claudio   11/3/2021  9:00 AM SCHEDULE, SONIA AWV ABBEY HATHAWAY   79/3/5827 10:00 AM MD NEY Claudio

## 2021-08-24 NOTE — PATIENT INSTRUCTIONS
Need help scheduling your covid vaccine? [address] [phone]       PLEASE BRING YOUR MEDICATIONS TO ALL APPOINTMENTS    The diagnoses and medications listed in this after visit summary may not be accurate at the time of check out. Please check MY CHART in 28-48 hours for possible corrections. Late cancellation policy: So that we can better accommodate people who are sick, please give our office 24 hour notice for an appointment cancellation. Thank you. Missed appointments: Your care is very important to us. It is important that you keep your scheduled appointments. Multiple missed appointments will lead to a dismissal from the office. Later arrival policy: If you are more than 10 minutes late for your appointment, you will be asked to re-schedule. Please allow 5-7 business days for paperwork to be processed. It is important that you check your MY Chart messages, as they include appointment reminders, test results, and other important information. If you have forgotten your password, please call [phone]. HERE ARE SOME LIFE CHANGING TIPS      1. Take your blood pressure medications at bedtime. This reduces your chance of cardiovascular event by half  2. Fever in kids:  Give both Tylenol and Ibuprofen at the same time rather than staggering them which is confusing  3. Follow these tips to reduce childhood obesity: Reduce unnecessary exposure to antibiotics, consume whole milk instead of skim milk, watch public TV instead of regular TV (less exposure to junk food commercials), and reduce traumatic experiences. 4. 1 egg per day is good for your heart  5. Alternate day fasting does promote weight loss. Skipping breakfast increases your risk of obesity  6. Artificially sweetened drinks increase all cause mortality (strokes, BMI, cardiovascular)  7. Kale consumption can reduce onset of dementia  8.  Walking at least 8000 steps per day and resistance exercise 2-3 x per week are good for your heart  9.  Brushing teeth 3 times per day can decrease chance of getting diabetes

## 2021-08-24 NOTE — PROGRESS NOTES
Patient ID: Simona Coto 1940    . Chief Complaint   Patient presents with    ED Follow-up    COPD     needs portable o2 does not have any thing         COPD  She complains of cough, difficulty breathing, frequent throat clearing and shortness of breath. This is a chronic (see ER note for details. called ambulance few days ago when oxygen was 78. Pulse ox 88 this morning when woke up) problem. The current episode started more than 1 year ago. The problem occurs constantly. The problem has been gradually worsening. The cough is productive of sputum. Associated symptoms include dyspnea on exertion. Her symptoms are aggravated by any activity. Her symptoms are alleviated by beta-agonist (patient is now noncompliant with her inhalers due to dementia). Risk factors for lung disease include smoking/tobacco exposure. Her past medical history is significant for COPD. History is per caregiver (granddaughter)  Review of Systems   Respiratory: Positive for cough and shortness of breath. Cardiovascular: Positive for dyspnea on exertion.        Patient Active Problem List   Diagnosis    Peripheral vascular disease ([address]    Hyperlipidemia    Alcohol abuse    Moderate COPD (chronic obstructive pulmonary disease) ([address]    Osteoporosis    History of breast cancer    Cachectic ([address]    Bilateral hearing loss    Functional urinary incontinence    Unsteady gait       Past Surgical History:   Procedure Laterality Date    ABDOMEN SURGERY  1970's    \"Surgery For Adhesions\"    ABDOMINAL AORTIC ANEURYSM REPAIR, ENDOVASCULAR  11/13/2012    BREAST BIOPSY  5/2013    benign    BREAST LUMPECTOMY  12-06    Lumpectomy Right Breast For Cancer, Had Radiation  Treatments    COLONOSCOPY  2011, 8[place]12    [facility] [place]    DENTAL SURGERY      Teeth Extracted In Past    FEMORAL BYPASS  4-09    \"Both Legs\"    FRACTURE SURGERY  Early 1980's    Broken Left Arm    HYSTERECTOMY, TOTAL ABDOMINAL  1980's       Family History Problem Relation Age of Onset    Colon Cancer Father     Coronary Art Dis Sister     Heart Disease Sister     High Blood Pressure Sister     High Cholesterol Sister     Coronary Art Dis Brother     Heart Attack Brother          age 76    Heart Disease Brother     Early Death Brother     Other Sister         \"Both Legs Amputated\"    Kidney Disease Sister         \"Kidney Problems\"    Early Death Brother         Suicide- hung himself    Early Death Brother         Murdered in his 42's    Early Death Son 1         in a fire    Early Death Mother 61    Vision Loss Sister         \"Eye Problems\"    Other Daughter         \"Lung Problems\"       Current Outpatient Medications on File Prior to Visit   Medication Sig Dispense Refill    naltrexone (DEPADE) 50 MG tablet TAKE 1 TABLET BY MOUTH ONCE DAILY *EMERGENCY REFILL* 30 tablet 0    albuterol (PROVENTIL) (2.5 MG/3ML) 0.083% nebulizer solution Take 3 mLs by nebulization 4 times daily 1080 mL 3    donepezil (ARICEPT) 10 MG tablet Take 1 tablet by mouth nightly 90 tablet 3    thiamine 100 MG tablet Take 1 tablet by mouth daily 90 tablet 3    Calcium Polycarbophil (FIBER) 625 MG TABS Take 1 tablet by mouth daily 90 tablet 3    Multiple Vitamins-Minerals (THERAPEUTIC MULTIVITAMIN-MINERALS) tablet Take 1 tablet by mouth daily 90 tablet 3    folic acid (FOLVITE) 1 MG tablet Take 1 tablet by mouth daily 90 tablet 3    clopidogrel (PLAVIX) 75 MG tablet Take 1 tablet by mouth daily 90 tablet 3    atorvastatin (LIPITOR) 80 MG tablet Take 1 tablet by mouth daily 90 tablet 3    Incontinence Supply Disposable MISC Change 4 times per day 120 each 11    Nebulizers (MINI PLUS NEBULIZER) MISC Use 4 times per day as needed 1 each 0    calcium carbonate-vitamin D (CALCIUM 600 + D) 600-400 MG-UNIT TABS per tab Twice daily by mouth (Patient taking differently: 1 tablet Once a daily by mouth) 60 tablet 11    aspirin 325 MG tablet Take 325 mg by mouth every morning. Over The Counter , Last Dose Taken 11-9-12 Due To Scheduled Procedure      OXYGEN by Nasal route nightly. Oxygen 2 liters per minute      albuterol sulfate HFA (PROAIR HFA) 108 (90 Base) MCG/ACT inhaler Inhale 2 puffs into the lungs every 4 hours as needed for Wheezing or Shortness of Breath (Patient not taking: Reported on 8/24/2021) 3 Inhaler 3    vitamin B-1 (THIAMINE) 100 MG tablet Take 1 tablet by mouth daily (Patient not taking: Reported on 8/24/2021) 90 tablet 3    UNABLE TO FIND Shower Chair 1 each 0    Spacer/Aero Chamber Mouthpiece MISC 1 each by Does not apply route once as needed (SOB) 1 each 0    UNABLE TO FIND Shingles vaccine (Patient not taking: Reported on 11/9/2020) 1 Units 0     No current facility-administered medications on file prior to visit. Objective:         Physical Exam  Vitals and nursing note reviewed. Constitutional:       Appearance: She is well-groomed and underweight. She is not ill-appearing. Comments: Sitting comfortably in wheelchair. Caregiver is quite anxious   HENT:      Head: Normocephalic and atraumatic. Cardiovascular:      Rate and Rhythm: Normal rate and regular rhythm. Heart sounds: Normal heart sounds, S1 normal and S2 normal.   Pulmonary:      Effort: Pulmonary effort is normal. No respiratory distress. Breath sounds: Decreased breath sounds present. No wheezing. Musculoskeletal:      Cervical back: Neck supple. Skin:     General: Skin is warm and dry. Neurological:      Mental Status: She is alert. Vitals:    08/24/21 1127   BP: 118/70   Site: Right Upper Arm   Position: Sitting   Cuff Size: Medium Adult   Pulse: 83   SpO2: 98%     There is no height or weight on file to calculate BMI.      Wt Readings from Last 3 Encounters:   08/16/21 102 lb (46.3 kg)   05/06/21 102 lb 13.6 oz (46.7 kg)   11/09/20 98 lb 12.8 oz (44.8 kg)     BP Readings from Last 3 Encounters:   08/24/21 118/70   08/16/21 130/75 05/06/21 110/70          No results found for this visit on 08/24/21. The ASCVD Risk score (Valarie Hassan et al., 2013) failed to calculate for the following reasons: The 2013 ASCVD risk score is only valid for ages 36 to 78  Lab Review not applicable       1. No acute cardiopulmonary disease. 2. COPD.             Assessment:       Diagnosis Orders   1. Moderate COPD (chronic obstructive pulmonary disease) ([facility]  Misc. Devices (WALL GRAB BAR) MISC    predniSONE (DELTASONE) 10 MG tablet    ipratropium (ATROVENT) 0.02 % nebulizer solution    budesonide (PULMICORT) 0.5 MG/2ML nebulizer suspension   2. Need for COVID-19 vaccine     3. Dementia associated with alcoholism without behavioral disturbance ([facility]             Plan:      Simona Coto was evaluated today and a DME order was entered for a standard wheelchair because she requires this to successfully complete daily living tasks of ambulating. A standard manual wheelchair is necessary due to patient's impaired ambulation and mobility restrictions and would be unable to resolve these daily living tasks using a cane or walker. The patient is capable of using a standard wheelchair safely in their home and can maneuver within their home with adequate access. There is a caregiver available to provide necessary assistance. The need for this equipment was discussed with the patient and she understands, is in agreement, and has not expressed an unwillingness to use the wheelchair. Simona Coto was evaluated today and a DME order was entered for semi electric hospital bed because she requires assistance for requirement of elevation of head of bed more than 30 degrees for the diagnosis of COPD. Patient needs semi electric bed to perform patient transfers and for personal cares. Current body  . The need for this equipment was discussed with the patient and she understands and is in agreement.   Pillows or wedges have been considered and have been ruled

## 2021-09-07 PROBLEM — R06.02 SOB (SHORTNESS OF BREATH): Status: ACTIVE | Noted: 2021-01-01

## 2021-09-07 NOTE — PROGRESS NOTES
Medication History  [facility]    Patient Name: Yong Alvarez 1940     Medication history has been completed by: Valentín Mathews CPhT    Source(s) of information: insurance claims     Primary Care Physician: Teodoro Leon MD     Pharmacy: [facility]    Allergies as of 09/07/2021 - Fully Reviewed 09/07/2021   Allergen Reaction Noted    Pcn [penicillins] Other (See Comments) 12/07/2011    Thiazide-type diuretics  11/20/2012        Prior to Admission medications    Medication Sig Start Date End Date Taking? Authorizing Provider   ipratropium (ATROVENT HFA) 17 MCG/ACT inhaler Inhale 1 puff into the lungs daily 10/21/20 10/22/21 Yes Historical Provider, MD   budesonide (PULMICORT) 0.5 MG/2ML nebulizer suspension Take 2 mLs by nebulization 2 times daily 8/27/21   Teodoro Leon MD   budesonide (PULMICORT) 0.5 MG/2ML nebulizer suspension Take 2 mLs by nebulization 2 times daily 8/27/21 9/7/21  Teodoro Leon MD   Misc.  Devices (WALL GRAB BAR) MISC Place by the toilet 8/24/21   Teodoro Leon MD   predniSONE (DELTASONE) 10 MG tablet Take 1 tablet by mouth daily 8/24/21 9/23/21  Teodoro Leon MD   ipratropium (ATROVENT) 0.02 % nebulizer solution Take 2.5 mLs by nebulization 4 times daily Nebulize with albuterol 8/24/21   Teodoro Leon MD   naltrexone (DEPADE) 50 MG tablet TAKE 1 TABLET BY MOUTH ONCE DAILY *EMERGENCY REFILL* 8/18/21   Teodoro Leon MD   albuterol (PROVENTIL) (2.5 MG/3ML) 0.083% nebulizer solution Take 3 mLs by nebulization 4 times daily 6/15/21 9/13/21  Teodoro Leon MD   donepezil (ARICEPT) 10 MG tablet Take 1 tablet by mouth nightly 2/9/21   Teodoro Leon MD   thiamine 100 MG tablet Take 1 tablet by mouth daily 2/9/21   Teodoro Leon MD   Calcium Polycarbophil (FIBER) 625 MG TABS Take 1 tablet by mouth daily 11/18/20   Teodoro Leon MD   Multiple Vitamins-Minerals (THERAPEUTIC MULTIVITAMIN-MINERALS) tablet Take 1 tablet by mouth daily 11/9/20 11/9/21  Teodoro Leon MD albuterol sulfate HFA (PROAIR HFA) 108 (90 Base) MCG/ACT inhaler Inhale 2 puffs into the lungs every 4 hours as needed for Wheezing or Shortness of Breath  Patient not taking: Reported on 8/24/2021 10/21/20   Jaspreet Monahan MD   folic acid (FOLVITE) 1 MG tablet Take 1 tablet by mouth daily 10/21/20   Jaspreet Monahan MD   clopidogrel (PLAVIX) 75 MG tablet Take 1 tablet by mouth daily 10/21/20   Jaspreet Monahan MD   atorvastatin (LIPITOR) 80 MG tablet Take 1 tablet by mouth daily 10/21/20   Jaspreet Monahan MD   Incontinence Supply Disposable MISC Change 4 times per day 10/21/20   Jaspreet Monahan MD   Nebulizers (MINI PLUS NEBULIZER) MISC Use 4 times per day as needed 7/9/20   Jaspreet Monahan MD   vitamin B-1 (THIAMINE) 100 MG tablet Take 1 tablet by mouth daily  Patient not taking: Reported on 8/24/2021 11/5/19   Jaspreet Monahan MD   Spacer/Aero Chamber Mouthpiece [id number] [facility] 1 each by Does not apply route once as needed (SOB) 4/23/19 11/9/22  Jaspreet Monahan MD   calcium carbonate-vitamin D (CALCIUM 600 + D) 600-400 MG-UNIT TABS per tab Twice daily by mouth  Patient taking differently: 1 tablet Once a daily by mouth 3/23/15   Jaspreet Monahan MD   aspirin 325 MG tablet Take 325 mg by mouth every morning. Over The Counter , Last Dose Taken 11-9-12 Due To Scheduled Procedure    Historical Provider, MD   OXYGEN by Nasal route nightly. Oxygen 2 liters per minute    Historical Provider, MD     Medications added or changed (ex. new medication, dosage change, interval change, formulation change): Atrovent inhaler 1 puff daily (added)    Medications removed from list (include reason, ex. noncompliance, medication cost, therapy complete etc.):   Budesonide neb duplicate  Shower chair clean up list  Shingles vaccine clean up list    Medications requiring reconciliation with provider:    Atrovent inhaler 1 puff daily (added)    Comments:  Unable to assess patient d/t altered status.   Medication list per claims, patient with active scripts for all medications.     To my knowledge the above medication history is accurate as of 9/7/2021 8:02 AM.   Coco Monge CPhT   9/7/2021 8:02 AM

## 2021-09-07 NOTE — CONSULTS
INPATIENT CARDIOLOGY CONSULT NOTE       Reason for consultation: Shortness of breath    Referring physician:  Barbra Rodriguez MD     Primary care physician: Mari Eagle MD      Dear Barbra Rodriguez MD Thank you for the consult    Chief Complaint   Patient presents with    Shortness of Breath       History of present Lenin Alvarado is a 78 y. o.year old who  presents with  Chief Complaint   Patient presents with    Shortness of Breath       Patient is 75-year-old female with prior medical history significant for COPD on home oxygen, history of alcoholism, dementia, history of breast cancer, hypertension, hyperlipidemia, severe pulmonary hypertension, peripheral vascular disease. Patient is admitted to the hospital with shortness of breath  Denies any productive cough fever chills rigors. Complains of lower extremity edema which has responded to diuretics in the ER        Past medical history:    has a past medical history of AAA (abdominal aortic aneurysm) ([address] Alcohol abuse, Alcohol-induced persisting dementia ([address] Arthritis, Asthma, COPD (chronic obstructive pulmonary disease) ([address] History of breast cancer, Hx of blood clots, HX OTHER MEDICAL, Hyperlipidemia, Hypertension, Iliac artery aneurysm, bilateral ([address] On home oxygen therapy, Osteopenia, Peripheral vascular disease ([address] and Presbyopia. Past surgical history:   has a past surgical history that includes Breast lumpectomy (12-06); Dental surgery; Hysterectomy, total abdominal (1980's); femoral bypass (4-09); Abdomen surgery (1970's); Colonoscopy (2011, 8-12); fracture surgery (Early 1980's); AAA repair, endovascular (11/13/2012); and Breast biopsy (5/2013). Social History:   reports that she quit smoking about 11 months ago. Her smoking use included cigarettes. She has a 61.00 pack-year smoking history. She has never used smokeless tobacco. She reports current alcohol use. She reports that she does not use drugs.   Family history:   no family history of CAD, STROKE of DM    Allergies   Allergen Reactions    Pcn [Penicillins] Other (See Comments)     \"Been so long ago, that I forgot. \"    Thiazide-Type Diuretics      low sodium       aspirin tablet 325 mg, QAM  albuterol sulfate  (90 Base) MCG/ACT inhaler 2 puff, F9O PRN  folic acid (FOLVITE) tablet 1 mg, Daily  clopidogrel (PLAVIX) tablet 75 mg, Daily  atorvastatin (LIPITOR) tablet 80 mg, Nightly  therapeutic multivitamin-minerals 1 tablet, Daily  polycarbophil (FIBERCON) tablet 625 mg, Daily  donepezil (ARICEPT) tablet 10 mg, Nightly  thiamine tablet 100 mg, Daily  albuterol (PROVENTIL) nebulizer solution 2.5 mg, 4x Daily  ipratropium (ATROVENT) 0.02 % nebulizer solution 0.5 mg, 4x Daily  budesonide (PULMICORT) nebulizer suspension 500 mcg, BID  predniSONE (DELTASONE) tablet 10 mg, Daily  sodium chloride flush 0.9 % injection 5-40 mL, 2 times per day  sodium chloride flush 0.9 % injection 5-40 mL, PRN  0.9 % sodium chloride infusion, PRN  ondansetron (ZOFRAN-ODT) disintegrating tablet 4 mg, Q8H PRN   Or  ondansetron (ZOFRAN) injection 4 mg, Q6H PRN  polyethylene glycol (GLYCOLAX) packet 17 g, Daily PRN  acetaminophen (TYLENOL) tablet 650 mg, Q6H PRN   Or  acetaminophen (TYLENOL) suppository 650 mg, Q6H PRN  enoxaparin (LOVENOX) injection 40 mg, Daily  furosemide (LASIX) injection 20 mg, Daily      Current Facility-Administered Medications   Medication Dose Route Frequency Provider Last Rate Last Admin    aspirin tablet 325 mg  325 mg Oral QAM Modesto Doran MD   325 mg at 09/07/21 0948    albuterol sulfate  (90 Base) MCG/ACT inhaler 2 puff  2 puff Inhalation Q4H PRN Modesto Doran MD        folic acid (FOLVITE) tablet 1 mg  1 mg Oral Daily Modestoambar Doran MD   1 mg at 09/07/21 0948    clopidogrel (PLAVIX) tablet 75 mg  75 mg Oral Daily Modestoambar Doran MD   75 mg at 09/07/21 0948    atorvastatin (LIPITOR) tablet 80 mg  80 mg Oral Nightly Jonaselizabeth Welch MD        therapeutic multivitamin-minerals 1 tablet  1 tablet Oral Daily Librado ESPINAL MD   1 tablet at 09/07/21 0949    polycarbophil (FIBERCON) tablet 625 mg  625 mg Oral Daily Librado ESPINAL MD   625 mg at 09/07/21 0949    donepezil (ARICEPT) tablet 10 mg  10 mg Oral Nightly Modesto Brown MD        thiamine tablet 100 mg  100 mg Oral Daily Librado ESPINAL MD   100 mg at 09/07/21 0948    albuterol (PROVENTIL) nebulizer solution 2.5 mg  2.5 mg Nebulization 4x Daily Modesto Brown MD        ipratropium (ATROVENT) 0.02 % nebulizer solution 0.5 mg  0.5 mg Nebulization 4x Daily Modesto Brown MD        budesonide (PULMICORT) nebulizer suspension 500 mcg  500 mcg Nebulization BID Modesto Brown MD        predniSONE (DELTASONE) tablet 10 mg  10 mg Oral Daily Modestoambar Brown MD   10 mg at 09/07/21 0948    sodium chloride flush 0.9 % injection 5-40 mL  5-40 mL IntraVENous 2 times per day Yesi You MD   5 mL at 09/07/21 1028    sodium chloride flush 0.9 % injection 5-40 mL  5-40 mL IntraVENous PRN Modesto Brown MD        0.9 % sodium chloride infusion  25 mL IntraVENous PRN Modesto Brown MD        ondansetron (ZOFRAN-ODT) disintegrating tablet 4 mg  4 mg Oral Q8H PRN Modesto Brown MD        Or    ondansetron (ZOFRAN) injection 4 mg  4 mg IntraVENous Q6H PRN Modesto Brown MD        polyethylene glycol (GLYCOLAX) packet 17 g  17 g Oral Daily PRN Modesto Brown MD        acetaminophen (TYLENOL) tablet 650 mg  650 mg Oral Q6H PRN Modesto Brown MD        Or    acetaminophen (TYLENOL) suppository 650 mg  650 mg Rectal Q6H PRN Modesto Borwn MD        enoxaparin (LOVENOX) injection 40 mg  40 mg SubCUTAneous Daily Modesto Brown MD        furosemide (LASIX) injection 20 mg  20 mg IntraVENous Daily Modestoambar Brown MD   20 mg at 09/07/21 8008     Current Outpatient Medications   Medication Sig Dispense Refill    ipratropium (ATROVENT HFA) 17 MCG/ACT inhaler Inhale 1 puff into the lungs daily      budesonide (PULMICORT) 0.5 MG/2ML nebulizer suspension Take 2 mLs by nebulization 2 times daily 180 ampule 0    Misc.  Devices (WALL GRAB BAR) MISC Place by the toilet 1 each 0    predniSONE (DELTASONE) 10 MG tablet Take 1 tablet by mouth daily 30 tablet 0    ipratropium (ATROVENT) 0.02 % nebulizer solution Take 2.5 mLs by nebulization 4 times daily Nebulize with albuterol 2.5 mL 3    naltrexone (DEPADE) 50 MG tablet TAKE 1 TABLET BY MOUTH ONCE DAILY *EMERGENCY REFILL* 30 tablet 0    albuterol (PROVENTIL) (2.5 MG/3ML) 0.083% nebulizer solution Take 3 mLs by nebulization 4 times daily 1080 mL 3    donepezil (ARICEPT) 10 MG tablet Take 1 tablet by mouth nightly 90 tablet 3    thiamine 100 MG tablet Take 1 tablet by mouth daily 90 tablet 3    Calcium Polycarbophil (FIBER) 625 MG TABS Take 1 tablet by mouth daily 90 tablet 3    Multiple Vitamins-Minerals (THERAPEUTIC MULTIVITAMIN-MINERALS) tablet Take 1 tablet by mouth daily 90 tablet 3    albuterol sulfate HFA (PROAIR HFA) 108 (90 Base) MCG/ACT inhaler Inhale 2 puffs into the lungs every 4 hours as needed for Wheezing or Shortness of Breath (Patient not taking: Reported on 8/24/2021) 3 Inhaler 3    folic acid (FOLVITE) 1 MG tablet Take 1 tablet by mouth daily 90 tablet 3    clopidogrel (PLAVIX) 75 MG tablet Take 1 tablet by mouth daily 90 tablet 3    atorvastatin (LIPITOR) 80 MG tablet Take 1 tablet by mouth daily 90 tablet 3    Incontinence Supply Disposable MISC Change 4 times per day 120 each 11    Nebulizers (MINI PLUS NEBULIZER) MISC Use 4 times per day as needed 1 each 0    vitamin B-1 (THIAMINE) 100 MG tablet Take 1 tablet by mouth daily (Patient not taking: Reported on 8/24/2021) 90 tablet 3    Spacer/Aero Chamber Mouthpiece MISC 1 each by Does not apply route once as needed (SOB) 1 each 0    calcium carbonate-vitamin D (CALCIUM 600 + D) 600-400 MG-UNIT TABS per tab Twice daily by mouth (Patient taking differently: 1 tablet Once a daily by mouth) 60 tablet 11    aspirin 325 MG tablet Take 325 mg by mouth every morning. Over The Counter , Last Dose Taken 11-9-12 Due To Scheduled Procedure      OXYGEN by Nasal route nightly. Oxygen 2 liters per minute           Review of Systems:     · Constitutional: No Fever or Weight Loss   · Eyes: No Decreased Vision  · ENT: No Headaches, Hearing Loss or Vertigo  · Cardiovascular: No chest pain, + dyspnea on exertion, palpitations or loss of consciousness  · Respiratory: No cough or wheezing    · Gastrointestinal: No abdominal pain, appetite loss, blood in stools, constipation, diarrhea or heartburn  · Genitourinary: No dysuria, trouble voiding, or hematuria  · Musculoskeletal:  No gait disturbance, weakness or joint complaints  · Integumentary: No rash or pruritis  · Neurological: No TIA or stroke symptoms  · Psychiatric: No anxiety or depression  · Endocrine: No malaise, fatigue or temperature intolerance  · Hematologic/Lymphatic: No bleeding problems, blood clots or swollen lymph nodes  · Allergic/Immunologic: No nasal congestion or hives    All other systems were reviewed and were negative otherwise. Physical Examination:      Vitals:    09/07/21 1132   BP: (!) 171/72   Pulse: 67   Resp: 15   Temp: 97.9 °F (36.6 °C)   SpO2: 100%      Wt Readings from Last 3 Encounters:   09/07/21 102 lb (46.3 kg)   08/16/21 102 lb (46.3 kg)   05/06/21 102 lb 13.6 oz (46.7 kg)     Body mass index is 18.07 kg/m². General Appearance:  No distress, conversant  Constitutional:  Well developed, Well nourished  HEENT:  Normocephalic, Atraumatic, Oropharynx moist, No oral exudates,   Nose normal. Neck Supple Carotid: no carotid bruit  Eyes:  Conjunctiva normal, No discharge. Respiratory:    Normal breath sounds, No respiratory distress, No wheezing, no use of accessory muscles, diaphragm movement is normal  No chest Tenderness  Cardiovascular: S1-S2 No murmurs auscultated. No rubs, thrills or gallops. Normal  rhythm.  Pedal pulses are normal. +1 pedal edema  GI:  Soft Non tender, non distended. :  No CVA tenderness. Musculoskeletal:   No tenderness, No cyanosis, No clubbing. Integument:  Warm, Dry, No erythema, No rash. Lymphatic:  No lymphadenopathy noted. Neurologic:  Alert & oriented x 3  No focal deficits noted. Psychiatric:  Affect normal, Judgment normal, Mood normal.       Lab Review     Recent Labs     09/07/21 0112   WBC 5.1   HGB 10.2*   HCT 32.5*         Recent Labs     09/07/21 0112      K 4.7      CO2 35*   BUN 18   CREATININE 1.0     Recent Labs     09/07/21 0112   AST 26   ALT 15   BILITOT 0.3   ALKPHOS 79     No results for input(s): TROPONINI in the last 72 hours. No results found for: BNP  Lab Results   Component Value Date    INR 0.99 02/16/2017    PROTIME 11.3 02/16/2017         All labs, images, EKGs were personally reviewed      Assessment: 78 y. o.year old with PMH of  has a past medical history of AAA (abdominal aortic aneurysm) ([address] Alcohol abuse, Alcohol-induced persisting dementia ([address] Arthritis, Asthma, COPD (chronic obstructive pulmonary disease) ([address] History of breast cancer, Hx of blood clots, HX OTHER MEDICAL, Hyperlipidemia, Hypertension, Iliac artery aneurysm, bilateral ([address] On home oxygen therapy, Osteopenia, Peripheral vascular disease ([address] and Presbyopia. Recommendations:      1. Shortness of breath  2. History of COPD oxygen dependent  3. Severe pulmonary hypertension secondary to COPD/cor pulmonale with RV dilation  4. CHF exacerbation    S/p IV Lasix x1  Patient responded well, lower extremity edema has almost subsided  Patient getting bronchodilator treatments and steroids for COPD exacerbation -management per pulmonology  Recommend oral Lasix 20 mg p.o. daily   Stress test in September 2020 reviewed. Negative for ischemia    5. History of PAD: On Plavix  6.  Hyperlipidemia: Continue statins    Thank you for the consult    Dr. Trinh Britton  9/7/2021 11:51 AM

## 2021-09-07 NOTE — ED NOTES
Dr. Aretha Alonzo at bedside updating pt [facility] on plan of care.       Hayden Rowland RN  09/07/21 3017

## 2021-09-07 NOTE — ED NOTES
[id number] perfect serve message sent to Dr Mehnaz Crews  09/07/21 7111  [id number] Dr Madai Horowitz perfect serve message.       Nelson Andersen  09/07/21 8461

## 2021-09-07 NOTE — H&P
HISTORY AND PHYSICAL  (Hospitalist, Internal Medicine)  IDENTIFYING INFORMATION   PATIENT:  Williams Ridley  MRN:  [id number]  ADMIT DATE: 9/7/2021  TIME OF EVALUATION: 9/7/2021 4:46 AM    CHIEF COMPLAINT     Shortness of breath  HISTORY OF PRESENT ILLNESS   Williams Ridley is a 78 y.o. female h/o COPD, on home oxygen, peripheral arterial disease-status post atherectomy  left popliteal TP trunk  and peroneal artery with PTA, aortobifemoral stent graft-2016, left femoral/popliteal-2016, history of breast cancer status post right breast lumpectomy, and radiation therapy, AAA repair, hypertension, hyperlipidemia, chronic tobacco dependence, chronic alcohol use admitted for shortness of breath has been present since yesterday, states that it is worse when she does not have her oxygen. Also complains of bilateral lower extremity swelling but symmetric in nature has been getting worse for last 3 to 4 days. Patient denies any associated chest pain, dizziness, nausea, vomiting, diaphoresis, palpitations, diarrhea or abdominal pain. Patient denies fevers , but does complain of dry cough.     PMH listed below:    PAST MEDICAL, SURGICAL, FAMILY, and SOCIAL HISTORY     Past Medical History:   Diagnosis Date    AAA (abdominal aortic aneurysm) (HCC)     Alcohol abuse     4 beers daily    Alcohol-induced persisting dementia (HCC)     Arthritis     Asthma     COPD (chronic obstructive pulmonary disease) (HCC)     History of breast cancer Dx 12-06    Lumpectomy Right Breast For Cancer, Had Radiation Treatments    Hx of blood clots     HX OTHER MEDICAL     Primary Care Physician Is Dr. Abigail Ford    Hyperlipidemia     high HDL    Hypertension     Iliac artery aneurysm, bilateral ([address]     On home oxygen therapy     At Night 2 Liters Per Nasal Cannula    Osteopenia     Peripheral vascular disease ([address] Dx in 5-2008    Presbyopia 12/10/2019     Past Surgical History:   Procedure Laterality Date    ABDOMEN SURGERY 3736'D    \"Surgery For Adhesions\"    ABDOMINAL AORTIC ANEURYSM REPAIR, ENDOVASCULAR  2012    BREAST BIOPSY  2013    benign    BREAST LUMPECTOMY      Lumpectomy Right Breast For Cancer, Had Radiation  Treatments    COLONOSCOPY      Renny    DENTAL SURGERY      Teeth Extracted In Past    FEMORAL BYPASS      \"Both Legs\"    FRACTURE SURGERY  Early     Broken Left Arm    HYSTERECTOMY, TOTAL ABDOMINAL       Family History   Problem Relation Age of Onset    Colon Cancer Father     Coronary Art Dis Sister     Heart Disease Sister     High Blood Pressure Sister     High Cholesterol Sister     Coronary Art Dis Brother     Heart Attack Brother          age 76    Heart Disease Brother     Early Death Brother     Other Sister         \"Both Legs Amputated\"    Kidney Disease Sister         \"Kidney Problems\"    Early Death Brother         Suicide- hung himself    Early Death Brother         Murdered in his 42's    Early Death Son 1         in a fire    Early Death Mother 61    Vision Loss Sister         \"Eye Problems\"    Other Daughter         \"Lung Problems\"     Family Hx of HTN  Family Hx as reviewed above, otherwise non-contributory  Social History     Socioeconomic History    Marital status:      Spouse name: None    Number of children: None    Years of education: None    Highest education level: None   Occupational History    None   Tobacco Use    Smoking status: Former Smoker     Packs/day: 1.00     Years: 61.00     Pack years: 61.00     Types: Cigarettes     Quit date: 2020     Years since quittin.9    Smokeless tobacco: Never Used   Substance and Sexual Activity    Alcohol use:  Yes     Alcohol/week: 0.0 standard drinks     Comment: \"Beer Every Day\"    Drug use: No    Sexual activity: Never   Other Topics Concern    None   Social History Narrative    None     Social Determinants of Health     Financial Resource Strain:     Difficulty of Paying Living Expenses:    Food Insecurity:     Worried About Running Out of Food in the Last Year:     [address] in the Last Year:    Transportation Needs:     Lack of Transportation (Medical):  Lack of Transportation (Non-Medical):    Physical Activity:     Days of Exercise per Week:     Minutes of Exercise per Session:    Stress:     Feeling of Stress :    Social Connections:     Frequency of Communication with Friends and Family:     Frequency of Social Gatherings with Friends and Family:     Attends Taoist Services:     Active Member of Clubs or Organizations:     Attends Club or Organization Meetings:     Marital Status:    Intimate Partner Violence:     Fear of Current or Ex-Partner:     Emotionally Abused:     Physically Abused:     Sexually Abused:        MEDICATIONS   Medications Prior to Admission  Not in a hospital admission. Current Medications  Current Facility-Administered Medications   Medication Dose Route Frequency Provider Last Rate Last Admin    albuterol (PROVENTIL) nebulizer solution 2.5 mg  2.5 mg Nebulization Q20 Min PRN Kevin Bautista MD   2.5 mg at 09/07/21 0400    albuterol (PROVENTIL) nebulizer solution 15 mg  15 mg Nebulization Q1H PRN Kevin Bautista MD        And    ipratropium (ATROVENT) 0.02 % nebulizer solution 0.25 mg  0.25 mg Nebulization Once PRN Suma Rader MD        predniSONE (DELTASONE) tablet 40 mg  40 mg Oral Once Suma Rader MD         Current Outpatient Medications   Medication Sig Dispense Refill    budesonide (PULMICORT) 0.5 MG/2ML nebulizer suspension Take 2 mLs by nebulization 2 times daily 180 ampule 0    budesonide (PULMICORT) 0.5 MG/2ML nebulizer suspension Take 2 mLs by nebulization 2 times daily 180 ampule 0    Misc.  Devices (WALL GRAB BAR) MISC Place by the toilet 1 each 0    predniSONE (DELTASONE) 10 MG tablet Take 1 tablet by mouth daily 30 tablet 0    ipratropium (ATROVENT) 0.02 % nebulizer solution Take 2.5 mLs by nebulization 4 times daily Nebulize with albuterol 2.5 mL 3    naltrexone (DEPADE) 50 MG tablet TAKE 1 TABLET BY MOUTH ONCE DAILY *EMERGENCY REFILL* 30 tablet 0    albuterol (PROVENTIL) (2.5 MG/3ML) 0.083% nebulizer solution Take 3 mLs by nebulization 4 times daily 1080 mL 3    donepezil (ARICEPT) 10 MG tablet Take 1 tablet by mouth nightly 90 tablet 3    thiamine 100 MG tablet Take 1 tablet by mouth daily 90 tablet 3    Calcium Polycarbophil (FIBER) 625 MG TABS Take 1 tablet by mouth daily 90 tablet 3    Multiple Vitamins-Minerals (THERAPEUTIC MULTIVITAMIN-MINERALS) tablet Take 1 tablet by mouth daily 90 tablet 3    albuterol sulfate HFA (PROAIR HFA) 108 (90 Base) MCG/ACT inhaler Inhale 2 puffs into the lungs every 4 hours as needed for Wheezing or Shortness of Breath (Patient not taking: Reported on 8/24/2021) 3 Inhaler 3    folic acid (FOLVITE) 1 MG tablet Take 1 tablet by mouth daily 90 tablet 3    clopidogrel (PLAVIX) 75 MG tablet Take 1 tablet by mouth daily 90 tablet 3    atorvastatin (LIPITOR) 80 MG tablet Take 1 tablet by mouth daily 90 tablet 3    Incontinence Supply Disposable MISC Change 4 times per day 120 each 11    Nebulizers (MINI PLUS NEBULIZER) MISC Use 4 times per day as needed 1 each 0    vitamin B-1 (THIAMINE) 100 MG tablet Take 1 tablet by mouth daily (Patient not taking: Reported on 8/24/2021) 90 tablet 3    UNABLE TO FIND Shower Chair 1 each 0    Spacer/Aero Chamber Mouthpiece MISC 1 each by Does not apply route once as needed (SOB) 1 each 0    UNABLE TO FIND Shingles vaccine (Patient not taking: Reported on 11/9/2020) 1 Units 0    calcium carbonate-vitamin D (CALCIUM 600 + D) 600-400 MG-UNIT TABS per tab Twice daily by mouth (Patient taking differently: 1 tablet Once a daily by mouth) 60 tablet 11    aspirin 325 MG tablet Take 325 mg by mouth every morning. Over The Counter , Last Dose Taken 11-9-12 Due To Scheduled Procedure      OXYGEN by Nasal route nightly. Oxygen 2 liters per minute           Allergies  Allergies   Allergen Reactions    Pcn [Penicillins] Other (See Comments)     \"Been so long ago, that I forgot. \"    Thiazide-Type Diuretics      low sodium       REVIEW OF SYSTEMS   Within above limitations. 14 point review of systems reviewed. Pertinent positive or negative as per HPI or otherwise negative per 14 point systems review. PHYSICAL EXAM     Wt Readings from Last 3 Encounters:   09/07/21 102 lb (46.3 kg)   08/16/21 102 lb (46.3 kg)   05/06/21 102 lb 13.6 oz (46.7 kg)       Blood pressure (!) 170/83, pulse 77, temperature 97.1 °F (36.2 °C), resp. rate 22, height 5' 3\" (1.6 m), weight 102 lb (46.3 kg), SpO2 100 %, not currently breastfeeding. General - AAO x 3  Psych - Appropriate affect/speech. No agitation  Eyes - Eye lids intact. No scleral icterus  ENT - Lips wnl. External ear clear/dry/intact. No thyromegaly on inspection  Neuro - No gross peripheral or central neuro deficits on inspection  Heart - Sinus. RRR. S1 and S2 present. No added HS/murmurs appreciated. No elevated JVD appreciated  Lung - Adequate air entry b/l, No crackles/wheezes appreciated  GI - Soft. No guarding/rigidity. No hepatosplenomegaly/ascites. BS+   - No CVA/suprapubic tenderness or palpable bladder distension  Skin - Intact. No rash/petechiae/ecchymosis. Warm extremities  MSK - Joints with normal ROM.  No joint swellings    Lines/Drains/Airways/Wounds:  [unfilled]    LABS AND IMAGING   CBC  [unfilled]    Last 3 Hemoglobin  Lab Results   Component Value Date    HGB 10.2 09/07/2021    HGB 10.1 08/16/2021    HGB 13.5 10/28/2020     Last 3 WBC/ANC  Lab Results   Component Value Date    WBC 5.1 09/07/2021    WBC 5.0 08/16/2021    WBC 3.6 10/28/2020     No components found for: GRNLOCTYABS  Last 3 Platelets  No results found for: PLATELET  Chemistry  [unfilled]  [unfilled]  No results found for: LDH  Coagulation Studies  Lab Results   Component Value Date    INR 0.99 02/16/2017     Liver Function Studies  Lab Results   Component Value Date    ALT 15 09/07/2021    AST 26 09/07/2021    ALKPHOS 79 09/07/2021       Recent Imaging        Relevant labs and imaging reviewed    ASSESSMENT AND PLAN   Rosy Tafoya is a 78 y.o. female p/w    diastolic CHF exacerbation - vs less likely COPD  PBNP elevated, B/L LE pitting edema,   Last echo EF 70  %,   Troponin: negative  - denied chest pain.          - admit inpatient, telemetry monitoring         - consult cardiology          - trend trop         -Started on Lasix IV 20 mg daily, given that she is Lasix naïve         - c/w antiplatelet, statin         - monitor BNP level         - daily weights, monitor I/O         - Echo   -Pulmonary consult, was given steroids in the ED, will defer further steroids to pulmonologist, no active wheezing during my examination, middle lobe collapse?    multilobular soft tissue mass is seen within the middle lobe  - general surgery consult       COPD, on home oxygen and chronic steriods  peripheral arterial disease-status post atherectomy    left popliteal TP trunk  and peroneal artery with PTA  aortobifemoral stent graft-2016, left femoral/popliteal-2016,   history of breast cancer status post right breast lumpectomy and radiation therapy  AAA repair  Hypertension/hyperlipidemia  h/o chronic tobacco dependence, chronic alcohol use       Case d/w ED provider    DVT ppx: lovenox  Code status: full    [place], Internal Medicine  9/7/2021 at 4:47 AM

## 2021-09-07 NOTE — ED TRIAGE NOTES
Pt presents to the ED with complaints of shortness of breath. Pt reports hx of COPD exacerbation and noticed swelling in her feet yesterday.

## 2021-09-07 NOTE — ED NOTES
[id number] perfect serve message sent to Dr Gwendolyn Villagran on in pt consult from hospitalist     Erica Acharya  09/07/21 5350 [id number] Dr Gwendolyn Villagran acknowledged perfect serve message.  Added to treatment team     Erica Acharya  09/07/21 0287

## 2021-09-07 NOTE — ED PROVIDER NOTES
vascular disease ([facility] [address] Dx in     Presbyopia 12/10/2019       CURRENT MEDICATIONS  [unfilled]    ALLERGIES  Allergies   Allergen Reactions    Pcn [Penicillins] Other (See Comments)     \"Been so long ago, that I forgot. \"    Thiazide-Type Diuretics      low sodium       SURGICAL HISTORY  Past Surgical History:   Procedure Laterality Date    ABDOMEN SURGERY      \"Surgery For Adhesions\"    ABDOMINAL AORTIC ANEURYSM REPAIR, ENDOVASCULAR  2012    BREAST BIOPSY  2013    benign    BREAST LUMPECTOMY      Lumpectomy Right Breast For Cancer, Had Radiation  Treatments    COLONOSCOPY  ,     Mukerjee    DENTAL SURGERY      Teeth Extracted In Past    FEMORAL BYPASS      \"Both Legs\"    FRACTURE SURGERY  Early     Broken Left Arm    HYSTERECTOMY, TOTAL ABDOMINAL         FAMILY HISTORY  Family History   Problem Relation Age of Onset    Colon Cancer Father     Coronary Art Dis Sister     Heart Disease Sister     High Blood Pressure Sister     High Cholesterol Sister     Coronary Art Dis Brother     Heart Attack Brother          age 76    Heart Disease Brother     Early Death Brother     Other Sister         \"Both Legs Amputated\"    Kidney Disease Sister         \"Kidney Problems\"    Early Death Brother         Suicide- hung himself    Early Death Brother         Murdered in his 42's    Early Death Son 1         in a fire    Early Death Mother 61    Vision Loss Sister         \"Eye Problems\"    Other Daughter         \"Lung Problems\"       SOCIAL HISTORY  Social History     Socioeconomic History    Marital status:      Spouse name: None    Number of children: None    Years of education: None    Highest education level: None   Occupational History    None   Tobacco Use    Smoking status: Former Smoker     Packs/day: 1.00     Years: 61.00     Pack years: 61.00     Types: Cigarettes     Quit date: 2020     Years since quittin.9    Smokeless tobacco: Never Used   Substance and Sexual Activity    Alcohol use: Yes     Alcohol/week: 0.0 standard drinks     Comment: \"Beer Every Day\"    Drug use: No    Sexual activity: Never   Other Topics Concern    None   Social History Narrative    None     Social Determinants of Health     Financial Resource Strain:     Difficulty of Paying Living Expenses:    Food Insecurity:     Worried About Running Out of Food in the Last Year:     Ran Out of Food in the Last Year:    Transportation Needs:     Lack of Transportation (Medical):  Lack of Transportation (Non-Medical):    Physical Activity:     Days of Exercise per Week:     Minutes of Exercise per Session:    Stress:     Feeling of Stress :    Social Connections:     Frequency of Communication with Friends and Family:     Frequency of Social Gatherings with Friends and Family:     Attends Confucianist Services:     Active Member of Clubs or Organizations:     Attends Club or Organization Meetings:     Marital Status:    Intimate Partner Violence:     Fear of Current or Ex-Partner:     Emotionally Abused:     Physically Abused:     Sexually Abused:          **Past medical, family and social histories, and nursing notes reviewed and verified by me**      PHYSICAL EXAM  VITAL SIGNS:   ED Triage Vitals   Enc Vitals Group      BP 09/07/21 0032 (!) 170/83      Pulse 09/07/21 0032 77      Resp 09/07/21 0032 22      Temp 09/07/21 0038 97.1 °F (36.2 °C)      Temp src --       SpO2 09/07/21 0032 100 %      Weight 09/07/21 0032 102 lb (46.3 kg)      Height 09/07/21 0032 5' 3\" (1.6 m)      Head Circumference --       Peak Flow --       Pain Score --       Pain Loc --       Pain Edu? --       Excl. in [address] during ED course were reviewed and are as charted.     Constitutional: Minimal distress, Non-toxic appearance  Eyes: Conjunctiva normal, No discharge  HENT: Normocephalic, Atraumatic, posterior oropharynx is nonerythematous and without exudate, uvula is midline, oropharynx moist  Neck: Supple, no stridor, no grossly visible or palpable masses  Cardiovascular: Regular rate and rhythm, No murmurs, No rubs, No gallops, no JVD  Pulmonary/Chest: Diminished lung sounds bilaterally, no respiratory distress or accessory muscle use, No wheezing, crackles or rhonchi. Abdomen: Soft, nondistended and nonrigid, No tenderness or peritoneal signs, No masses, normal bowel sounds  Back: No midline point tenderness, No paraspinous muscle tenderness. No CVA tenderness  Extremities: 2+ pitting edema bilateral feet and legs. 2+ DP and PT pulses with cap refill less than 2 seconds in the toes.   Otherwise elsewhere there are no gross deformities, no edema, no tenderness  Neurologic: Normal motor function, Normal sensory function, No focal deficits  Skin: Warm, Dry, No erythema, No rash, No cyanosis, No mottling  Lymphatic: No lymphadenopathy in the following location(s): cervical  Psychiatric: Alert and oriented x3, Affect normal          EKG Interpretation    Interpreted by emergency department physician    Rhythm: normal sinus   Rate: normal  Axis: normal  Ectopy: PACs  Conduction: normal  ST Segments: normal  T Waves: normal  Q Waves: none    Clinical Impression: Normal sinus rhythm with PACs      RADIOLOGY/PROCEDURES/LABS/MEDICATIONS ADMINISTERED:    I have reviewed and interpreted all of the currently available lab results from this visit (if applicable):  Results for orders placed or performed during the hospital encounter of 09/07/21   COVID-19, Rapid    Specimen: Nasopharyngeal   Result Value Ref Range    Source THROAT     SARS-CoV-2, NAAT NOT DETECTED NOT DETECTED   CBC Auto Differential   Result Value Ref Range    WBC 5.1 4.0 - 10.5 K/CU MM    RBC 3.18 (L) 4.2 - 5.4 M/CU MM    Hemoglobin 10.2 (L) 12.5 - 16.0 GM/DL    Hematocrit 32.5 (L) 37 - 47 %    .2 (H) 78 - 100 FL    MCH 32.1 (H) 27 - 31 PG    MCHC 31.4 (L) 32.0 - 36.0 %    RDW 14.6 11.7 - 14.9 % Platelets 736 252 - 640 K/CU MM    MPV 9.5 7.5 - 11.1 FL    Differential Type AUTOMATED DIFFERENTIAL     Segs Relative 63.2 36 - 66 %    Lymphocytes % 17.5 (L) 24 - 44 %    Monocytes % 15.4 (H) 0 - 4 %    Eosinophils % 2.7 0 - 3 %    Basophils % 0.4 0 - 1 %    Segs Absolute 3.3 K/CU MM    Lymphocytes Absolute 0.9 K/CU MM    Monocytes Absolute 0.8 K/CU MM    Eosinophils Absolute 0.1 K/CU MM    Basophils Absolute 0.0 K/CU MM    Nucleated RBC % 0.0 %    Total Nucleated RBC 0.0 K/CU MM    Total Immature Neutrophil 0.04 K/CU MM    Immature Neutrophil % 0.8 (H) 0 - 0.43 %   Comprehensive Metabolic Panel w/ Reflex to MG   Result Value Ref Range    Sodium 145 135 - 145 MMOL/L    Potassium 4.7 3.5 - 5.1 MMOL/L    Chloride 104 99 - 110 mMol/L    CO2 35 (H) 21 - 32 MMOL/L    BUN 18 6 - 23 MG/DL    CREATININE 1.0 0.6 - 1.1 MG/DL    Glucose 96 70 - 99 MG/DL    Calcium 9.1 8.3 - 10.6 MG/DL    Albumin 3.8 3.4 - 5.0 GM/DL    Total Protein 6.1 (L) 6.4 - 8.2 GM/DL    Total Bilirubin 0.3 0.0 - 1.0 MG/DL    ALT 15 10 - 40 U/L    AST 26 15 - 37 IU/L    Alkaline Phosphatase 79 40 - 128 IU/L    GFR Non- 53 (L) >60 mL/min/1.73m2    GFR African American >60 >60 mL/min/1.73m2    Anion Gap 6 4 - 16   Lipase   Result Value Ref Range    Lipase 24 13 - 60 IU/L   Brain Natriuretic Peptide   Result Value Ref Range    Pro-BNP 1,101 (H) <300 PG/ML          ABNORMAL LABS:  Labs Reviewed   CBC WITH AUTO DIFFERENTIAL - Abnormal; Notable for the following components:       Result Value    RBC 3.18 (*)     Hemoglobin 10.2 (*)     Hematocrit 32.5 (*)     .2 (*)     MCH 32.1 (*)     MCHC 31.4 (*)     Lymphocytes % 17.5 (*)     Monocytes % 15.4 (*)     Immature Neutrophil % 0.8 (*)     All other components within normal limits   COMPREHENSIVE METABOLIC PANEL W/ REFLEX TO MG FOR LOW K - Abnormal; Notable for the following components:    CO2 35 (*)     Total Protein 6.1 (*)     GFR Non- 53 (*)     All other components type        Disposition referral (if applicable):  No follow-up provider specified. Disposition medications (if applicable):  New Prescriptions    No medications on file       ED Provider Disposition Time  DISPOSITION            Electronically signed by: Oma Raymond M.D., 2021 3:07 AM      This dictation was created with voice recognition software. While attempts have been made to review the dictation as it is transcribed, on occasion the spoken word can be misinterpreted by the technology leading to omissions or inappropriate words, phrases or sentences.         João Caputo MD  [phone]

## 2021-09-07 NOTE — ED NOTES
Per pt granddaughter pt wears 3 L oxygen nasal cannula at all times and she got a breathing tx around 2320 last night.       Kelsey Polo RN  09/07/21 [id number]

## 2021-09-07 NOTE — CONSULTS
Subjective:   CHIEF COMPLAINT / HPI:  78year old female admitted with worsening sob and this is accompanied by wheeze. She has some cough but nosputum. She denies any fever or chest pain or hemoptysis.  She c/o leg swelling      Past Medical History:  Past Medical History:   Diagnosis Date    AAA (abdominal aortic aneurysm) (HCC)     Alcohol abuse     4 beers daily    Alcohol-induced persisting dementia (HCC)     Arthritis     Asthma     COPD (chronic obstructive pulmonary disease) (HCC)     History of breast cancer Dx 12-06    Lumpectomy Right Breast For Cancer, Had Radiation Treatments    Hx of blood clots     HX OTHER MEDICAL     Primary Care Physician Is Dr. Guerra Fus    Hyperlipidemia     high HDL    Hypertension     Iliac artery aneurysm, bilateral ([address]     On home oxygen therapy     At Night 2 Liters Per Nasal Cannula    Osteopenia     Peripheral vascular disease ([address] Dx in 5-2008    Presbyopia 12/10/2019       Past Surgical History:        Procedure Laterality Date    ABDOMEN SURGERY  1970's    \"Surgery For Adhesions\"    ABDOMINAL AORTIC ANEURYSM REPAIR, ENDOVASCULAR  11/13/2012    BREAST BIOPSY  5/2013    benign    BREAST LUMPECTOMY  12-06    Lumpectomy Right Breast For Cancer, Had Radiation  Treatments    [facility]    DENTAL SURGERY      Teeth Extracted In Past    FEMORAL BYPASS  4-09    \"Both Legs\"    FRACTURE SURGERY  Early 1980's    Broken Left Arm    HYSTERECTOMY, TOTAL ABDOMINAL  1980's       Current Medications:    Current Facility-Administered Medications: aspirin tablet 325 mg, 325 mg, Oral, QAM  albuterol sulfate  (90 Base) MCG/ACT inhaler 2 puff, 2 puff, Inhalation, B9C PRN  folic acid (FOLVITE) tablet 1 mg, 1 mg, Oral, Daily  clopidogrel (PLAVIX) tablet 75 mg, 75 mg, Oral, Daily  atorvastatin (LIPITOR) tablet 80 mg, 80 mg, Oral, Nightly  therapeutic multivitamin-minerals 1 tablet, 1 tablet, Oral, Daily  polycarbophil (FIBERCON) tablet 625 mg, 625 mg, Oral, Daily  donepezil (ARICEPT) tablet 10 mg, 10 mg, Oral, Nightly  thiamine tablet 100 mg, 100 mg, Oral, Daily  albuterol (PROVENTIL) nebulizer solution 2.5 mg, 2.5 mg, Nebulization, 4x Daily  ipratropium (ATROVENT) 0.02 % nebulizer solution 0.5 mg, 0.5 mg, Nebulization, 4x Daily  budesonide (PULMICORT) nebulizer suspension 500 mcg, 500 mcg, Nebulization, BID  predniSONE (DELTASONE) tablet 10 mg, 10 mg, Oral, Daily  sodium chloride flush 0.9 % injection 5-40 mL, 5-40 mL, IntraVENous, 2 times per day  sodium chloride flush 0.9 % injection 5-40 mL, 5-40 mL, IntraVENous, PRN  0.9 % sodium chloride infusion, 25 mL, IntraVENous, PRN  ondansetron (ZOFRAN-ODT) disintegrating tablet 4 mg, 4 mg, Oral, Q8H PRN **OR** ondansetron (ZOFRAN) injection 4 mg, 4 mg, IntraVENous, Q6H PRN  polyethylene glycol (GLYCOLAX) packet 17 g, 17 g, Oral, Daily PRN  acetaminophen (TYLENOL) tablet 650 mg, 650 mg, Oral, Q6H PRN **OR** acetaminophen (TYLENOL) suppository 650 mg, 650 mg, Rectal, Q6H PRN  enoxaparin (LOVENOX) injection 40 mg, 40 mg, SubCUTAneous, Daily  furosemide (LASIX) injection 20 mg, 20 mg, IntraVENous, Daily    Allergies   Allergen Reactions    Pcn [Penicillins] Other (See Comments)     \"Been so long ago, that I forgot. \"    Thiazide-Type Diuretics      low sodium       Social History:    Social History     Socioeconomic History    Marital status:      Spouse name: None    Number of children: None    Years of education: None    Highest education level: None   Occupational History    None   Tobacco Use    Smoking status: Former Smoker     Packs/day: 1.00     Years: 61.00     Pack years: 61.00     Types: Cigarettes     Quit date: 2020     Years since quittin.9    Smokeless tobacco: Never Used   Substance and Sexual Activity    Alcohol use:  Yes     Alcohol/week: 0.0 standard drinks     Comment: \"Beer Every Day\"    Drug use: No    Sexual activity: Never   Other Topics Concern    None   Social History Narrative    None     Social Determinants of Health     Financial Resource Strain:     Difficulty of Paying Living Expenses:    Food Insecurity:     Worried About Running Out of Food in the Last Year:     [address] in the Last Year:    Transportation Needs:     Lack of Transportation (Medical):      Lack of Transportation (Non-Medical):    Physical Activity:     Days of Exercise per Week:     Minutes of Exercise per Session:    Stress:     Feeling of Stress :    Social Connections:     Frequency of Communication with Friends and Family:     Frequency of Social Gatherings with Friends and Family:     Attends Jehovah's witness Services:     Active Member of Clubs or Organizations:     Attends Club or Organization Meetings:     Marital Status:    Intimate Partner Violence:     Fear of Current or Ex-Partner:     Emotionally Abused:     Physically Abused:     Sexually Abused:        Family History:   Family History   Problem Relation Age of Onset    Colon Cancer Father     Coronary Art Dis Sister     Heart Disease Sister     High Blood Pressure Sister     High Cholesterol Sister     Coronary Art Dis Brother     Heart Attack Brother          age 76    Heart Disease Brother     Early Death Brother     Other Sister         \"Both Legs Amputated\"    Kidney Disease Sister         \"Kidney Problems\"    Early Death Brother         Suicide- hung himself    Early Death Brother         Murdered in his 42's    Early Death Son 1         in a fire    Early Death Mother 61    Vision Loss Sister         \"Eye Problems\"    Other Daughter         \"Lung Problems\"       Immunization:  Immunization History   Administered Date(s) Administered    Influenza A (O0R9-28) Vaccine IM 2009    Influenza A (K5P7-46) Vaccine PF IM 2009    Influenza Virus Vaccine 2007, 10/27/2008, 2010, 2011, 10/31/2013, 2014, 10/31/2016    Influenza, High Dose (Fluzone 65 yrs and older) 10/10/2012, 10/31/2013, 09/22/2014, 10/06/2015, 10/31/2016, 11/14/2017, 10/02/2018    Influenza, Quadv, adjuvanted, 65 yrs +, IM, PF (Fluad) 11/09/2020    Influenza, Triv, inactivated, subunit, adjuvanted, IM (Fluad 65 yrs and older) 10/08/2019    Pneumococcal Conjugate 13-valent ([id number]) 06/23/2016    Pneumococcal Polysaccharide ([id number]) 10/31/2006    Tdap (Boostrix, Adacel) 12/10/2008         REVIEW OF SYSTEMS:    CONSTITUTIONAL:  negative for fevers, chills, diaphoresis, activity change, appetite change, fatigue, night sweats and unexpected weight change. EYES:  negative for blurred vision, eye discharge, visual disturbance and icterus  HEENT:  negative for hearing loss, tinnitus, ear drainage, sinus pressure, nasal congestion, epistaxis and snoring  RESPIRATORY:  See HPI  CARDIOVASCULAR:  negative for chest pain, palpitations, exertional chest pressure/discomfort, edema, syncope  GASTROINTESTINAL:  negative for nausea, vomiting, diarrhea, constipation, blood in stool and abdominal pain  GENITOURINARY:  negative for frequency, dysuria and hematuria  HEMATOLOGIC/LYMPHATIC:  negative for easy bruising, bleeding and lymphadenopathy  ALLERGIC/IMMUNOLOGIC:  negative for recurrent infections, angioedema, anaphylaxis and drug reactions  ENDOCRINE:  negative for weight changes and diabetic symptoms including polyuria, polydipsia and polyphagia    MUSCULOSKELETAL:  negative for  pain, joint swelling, decreased range of motion and muscle weakness  NEUROLOGICAL:  negative for headaches, slurred speech, unilateral weakness  PSYCHIATRIC/BEHAVIORAL: negative for hallucinations, behavioral problems, confusion and agitation.      Objective:   PHYSICAL EXAM:      VITALS:    Vitals:    09/07/21 0032 09/07/21 0038 09/07/21 0615 09/07/21 0946   BP: (!) 170/83  (!) 166/130 (!) 145/78   Pulse: 77  62 72   Resp: 22  18 16   Temp:  97.1 °F (36.2 °C)     SpO2: 100%  94% 100%   Weight: 102 lb (46.3 kg) Height: 5' 3\" (1.6 m)            CONSTITUTIONAL:  awake, alert, cooperative, no apparent distress, and appears stated age  NECK:  Supple, symmetrical, trachea midline, no adenopathy, thyroid symmetric, not enlarged and no tenderness  CHEST: Chest expansion equal and symmetrical, no intercostal retraction. LUNGS:  no increased work of breathing, has expiratory wheezes both lungs, no crackles. CARDIOVASCULAR: S1 and S2, no edema and no JVD  ABDOMEN:  normal bowel sounds, non-distended and no masses palpated, and no tenderness to palpation. No hepatospleenomegaly  LYMPHADENOPATHY:  no axillary or supraclavicular adenopathy. No cervical adnenopathy  PSYCHIATRIC: Oriented to person place and time. No obvious depression or anxiety. MUSCULOSKELETAL: No obvious misalignment or effusion of the joints. No clubbing, cyanosis of the digits. RIGHT AND LEFT LOWER EXTREMITIES: No edema, no inflammation, no tenderness. SKIN:  normal skin color, texture, turgor and no redness, warmth, or swelling. No palpable nodules    DATA:                 EXAMINATION:   CTA OF THE CHEST 9/7/2021 3:31 am       TECHNIQUE:   CTA of the chest was performed after the administration of intravenous   contrast.  Multiplanar reformatted images are provided for review.  MIP   images are provided for review.  Dose modulation, iterative reconstruction,   and/or weight based adjustment of the mA/kV was utilized to reduce the   radiation dose to as low as reasonably achievable.       COMPARISON:   09/30/2020       HISTORY:   ORDERING SYSTEM PROVIDED HISTORY: chest pain, SOB   TECHNOLOGIST PROVIDED HISTORY:   Reason for exam:->chest pain, SOB   Decision Support Exception - unselect if not a suspected or confirmed   emergency medical condition->Emergency Medical Condition (MA)   Reason for Exam: chest pain, SOB       FINDINGS:   Pulmonary Arteries: Motion artifact limits evaluation, particularly   peripherally.  No definite evidence of intraluminal filling defect to suggest   pulmonary embolism.  Main pulmonary artery is normal in caliber.       Mediastinum: No evidence of mediastinal lymphadenopathy.  The heart and   pericardium demonstrate no acute abnormality.  There is no acute abnormality   of the thoracic aorta.       Lungs/pleura: There is no pneumothorax or pleural effusion.  Emphysema is   noted with minor bilateral atelectasis but no evidence for pneumonia.  A   multilobular soft tissue mass is seen within the middle lobe, potentially   obscured by complete middle lobe collapse on the prior study.  The 2 separate   components measure 1.2 x 2.1 cm and 1.1 x 1.8 cm.       Upper Abdomen: Abdominal findings will be reported separately.       Soft Tissues/Bones: No acute bone or soft tissue abnormality.           Impression   1. No definite scan evidence for pulmonary embolus. 2. Lobulated soft tissue mass in the middle lobe.  PET-CT is recommended for   further evaluation.           Order History    Open Order Details               Assessment:     1. Ac copd  2. RML mass lobulated with two components  3. rml atx  4. chf          Plan:     1. D/w pt and family  2. Bd rx  3. On oral steroids  4. Started on lasix  5. She will need a PET scan as outpt and depending on results may need bx  6. Full pft as outpt  7. Thanks will follow  8. Pt was seen in ED.

## 2021-09-07 NOTE — ED NOTES
[id number] perfect serve message sent to Dr Itzel Carroll on in pt consult from hospitalist     Mitch Mauricio  09/07/21 [phone] Dr Itzel Carroll acknowledged perfect serve message.  Added to treatment team.      Mitch Mauricio  09/07/21 5565

## 2021-09-07 NOTE — PROGRESS NOTES
Hospitalist Progress Note      Name:  Santa Martinez /Age/Sex: 1940  (78 y.o. female)   MRN & CSN:  [id number] & [id number] Admission Date/Time: 2021 12:27 AM   Location:  [facility] PCP: Nehemias Martinez MD         Hospital Day: 1    Assessment and Plan:   Santa Martinez is a 78 y.o.  female with PMH of HTN, HLD, COPD, PVD, breast cancer and alcohol induced dementia was admitted with shortness of breath and found to have acute diastolic CHF. #Acute diastolic CHF. -Received IV Lasix in the ER,  -Cardiology consulted and started on oral Lasix 20 mg daily. -Monitor fluid status closely with I's and O's, daily weight and renal function  -Monitor electrolytes. -Telemetry. -Appreciate cardiology input. #COPD exacerbation.  -Bronchodilator inhalers and give nebulizers as needed.    -Continue steroids.  -Pulmonary input appreciated. #Lung mass/collapse.  -Pulmonary consulted. -For PET scan as outpatient by pulmonologist.  -Consideration for biopsy depending on PET result.  -Complete PFT as outpatient. #Chronic hypoxemic respiratory failure due to COPD. -Continue O2 supplementation.  -Treat underlying conditions. #Essential hypertension not on antihypertensives. #Hyperlipidemia on atorvastatin. #PVD without evidence of acute vascular occlusion. Continue aspirin, Plavix and atorvastatin. #Alcohol induced dementia. Reorient patient as needed        Diet ADULT DIET; Regular; Low Sodium (2 gm)   DVT Prophylaxis [x] Lovenox, []  Heparin, [] SCDs, [] Ambulation   GI Prophylaxis [] PPI,  [] H2 Blocker,  [] Carafate,  [x] Diet/Tube Feeds   Code Status Full Code   Disposition Patient requires continued admission due to shortness of breath on diuresis   MDM [] Low, [] Moderate,[x]  High  Patient's risk as above due to CHF exacerbation, COPD exacerbation, lung mass, chronic hypoxemic respiratory failure.      History of Present Illness:     Chief Complaint:   Patient was seen and examined in the morning. Chart reviewed and case discussed with the RN. No adverse event overnight. Patient has no new complaint. Ten point ROS reviewed negative, unless as noted above    Objective: Intake/Output Summary (Last 24 hours) at 9/7/2021 1528  Last data filed at 9/7/2021 1038  Gross per 24 hour   Intake --   Output 1550 ml   Net -1550 ml      Vitals:   Vitals:    09/07/21 1402   BP: (!) 133/92   Pulse: 80   Resp: 17   Temp:    SpO2:      Physical Exam:   GEN Awake female, sitting upright in bed in no apparent distress. Appears given age. EYES No scleral erythema, discharge, or conjunctivitis. HENT Mucous membranes are moist. No evidence of thrush. NECK Supple, no apparent thyromegaly or masses. RESP degrees entry to right lower zone. Few basal crackles, no wheezes or rhonchi. Symmetric chest movement while on nasal cannula. CARDIO/VASC S1/S2 auscultated. Regular rate without appreciable murmurs, rubs, or gallops. No JVD or carotid bruits. Peripheral pulses equal bilaterally but diminished. 1+ bilateral pedal edema. GI Abdomen is soft without significant tenderness, masses, or guarding. Bowel sounds are normoactive.  No costovertebral angle tenderness. Montelongo catheter is not present. HEME/LYMPH No palpable cervical lymphadenopathy and no hepatosplenomegaly. No petechiae or ecchymoses. MSK No gross joint deformities. SKIN Normal coloration, warm, dry. NEURO Cranial nerves appear grossly intact, normal speech, no lateralizing weakness. PSYCH Awake, alert, oriented x 4. Affect appropriate.     Medications:   Medications:    aspirin  325 mg Oral QAM    folic acid  1 mg Oral Daily    clopidogrel  75 mg Oral Daily    atorvastatin  80 mg Oral Nightly    therapeutic multivitamin-minerals  1 tablet Oral Daily    polycarbophil  625 mg Oral Daily    donepezil  10 mg Oral Nightly    thiamine  100 mg Oral Daily    budesonide  500 mcg Nebulization BID    predniSONE  10 mg Oral Daily    sodium chloride flush  5-40 mL IntraVENous 2 times per day    enoxaparin  40 mg SubCUTAneous Daily    furosemide  20 mg IntraVENous Daily    furosemide  20 mg Oral Daily    albuterol sulfate HFA  2 puff Inhalation TID    ipratropium  2 puff Inhalation TID      Infusions:    sodium chloride       PRN Meds: albuterol sulfate HFA, 2 puff, Q4H PRN  sodium chloride flush, 5-40 mL, PRN  sodium chloride, 25 mL, PRN  ondansetron, 4 mg, Q8H PRN   Or  ondansetron, 4 mg, Q6H PRN  polyethylene glycol, 17 g, Daily PRN  acetaminophen, 650 mg, Q6H PRN   Or  acetaminophen, 650 mg, Q6H PRN          Electronically signed by Idania Calderon MD on 9/7/2021 at 3:28 PM

## 2021-09-07 NOTE — PROGRESS NOTES
RT Inhaler-Nebulizer Bronchodilator Protocol Note    There is a bronchodilator order in the chart from a provider indicating to follow the RT Bronchodilator Protocol and there is an Initiate RT Bronchodilator Protocol order as well (see protocol at bottom of note). The findings from the last RT Protocol Assessment were as follows:  Smoking: >15 Pack years  Surgical Status: No surgery  Xray: X-ray not available  Respiratory Pattern: RR 12-20  Mental Status: Confused but follows commands  Breath Sounds: Scattered wheeze  Cough: Weak, productive  Activity Level: Mostly sedentary, minimal walking  Oxygen Requirement: 29% or 3LNC - 5LNC/40%  Indication for Bronchodilator Therapy: Decreased or absent breath sounds  Bronchodilator Assessment Score: 6    Aerosolized bronchodilator medication orders have been revised according to the RT Bronchodilator Protocol. RT Inhaler-Nebulizer Bronchodilator Protocol:    Respiratory Therapist to perform RT Therapy Protocol Assessment then follow the protocol. No Indications - adjust the frequency to every 6 hours PRN wheezing or bronchospasm, if no treatments needed after 48 hours then discontinue using Per Protocol order mode. If indication present, adjust the RT bronchodilator orders based on the Bronchodilator Assessment Score as follows:    0-6 - enter or revise RT bronchodilator order to Albuterol Inhaler order with frequency of every 2 hours PRN for wheezing or increased work of breathing using Per Protocol order mode. If Albuterol Inhaler not tolerated or not effective, then discontinue the Albuterol Inhaler order and enter Albuterol Nebulizer order with same frequency and PRN reasons. Repeat RT Therapy Protocol Assessment as needed.     7-10 - discontinue any other Inpatient aerosolized bronchodilator medication orders and enter or revise two Albuterol Inhaler orders, one with BID frequency and one with frequency of every 2 hours PRN wheezing or increased work of breathing using Per Protocol order mode. Repeat RT Therapy Protocol Assessment with second treatment then BID and as needed. If Albuterol Inhaler not tolerated or not effective, then discontinue the Albuterol Inhaler orders and enter two Albuterol Nebulizer orders with same frequencies and PRN reasons. 11-13 - discontinue any other Inpatient aerosolized bronchodilator medication orders and enter DuoNeb Nebulizer orders QID frequency and an Albuterol Nebulizer order every 2 hours PRN wheezing or increased work of breathing using Per Protocol order mode. Repeat RT Therapy Protocol Assessment with second treatment then QID and as needed. Greater than 13 - discontinue any other Inpatient bronchodilator aerosolized medication orders and enter DuoNeb Nebulizer order every 4 hours frequency and Albuterol Nebulizer every 2 hours PRN wheezing or increased work of breathing using Per Protocol order mode. Repeat RT Therapy Protocol Assessment with second treatment then every 4 hours and as needed. RT to enter RT Home Evaluation for COPD & MDI Assessment order using Per Protocol order mode. Pt takes MDI TID at home.  MDI ordered per home reg    Electronically signed by Oliva Dai RCP on 9/7/2021 at 4:46 PM

## 2021-09-07 NOTE — ED NOTES
Complete bed change. New depends placed on patient. New blankets given. Pure wick placed on patient. Family at bedside. Call light within reach.       Ashley [id number]  09/07/21 [id number]

## 2021-09-08 PROBLEM — E44.0 MODERATE MALNUTRITION (HCC): Chronic | Status: ACTIVE | Noted: 2021-01-01

## 2021-09-08 NOTE — PLAN OF CARE
Nutrition Problem #1: Moderate malnutrition, In context of chronic illness  Intervention: Food and/or Nutrient Delivery: Continue Current Diet  Nutritional Goals: Pt will consume at least 75% of her meals

## 2021-09-08 NOTE — PROGRESS NOTES
CARDIOLOGY PROGRESS NOTE                                               Name:  Carroll Bolton /Age/Sex: 1940  (78 y.o. female)   MRN & CSN:  [id number] & [id number] Admission Date/Time: 2021 12:27 AM   Location:  [facility]  PCP: Ronel Garner MD         Admit Date:  2021  Hospital Day: 2      SUBJECTIVE:   Seen patient as follow up as consultation for dyspnea     No chest pain. + shortness of breath / Improved   No palpations    TELEMETRY: Sinus       Intake/Output Summary (Last 24 hours) at 2021 1325  Last data filed at 2021 0523  Gross per 24 hour   Intake --   Output 300 ml   Net -300 ml       Assessment/Plan:         1. Shortness of breath  2. History of COPD oxygen dependent  3. Severe pulmonary hypertension secondary to COPD/cor pulmonale with RV dilation  4. CHF exacerbation     S/p IV Lasix x1  - cont oral lasix   Patient responded well, lower extremity edema has almost subsided  Patient getting bronchodilator treatments and steroids for COPD exacerbation -management per pulmonology  Stress test in 2020 reviewed. Negative for ischemia     5. History of PAD: On Plavix   6. Hyperlipidemia: Continue statins    Please call us with questions     Echo      Left ventricular systolic function is abnormal.   Ejection fraction is visually estimated at 60%. Moderate mitral regurgitation. Moderate tricuspid regurgitation; RVSP: 50 mmHg. Pulm HTN   No evidence of any pericardial effusion. Past medical history:    has a past medical history of AAA (abdominal aortic aneurysm) ([address] Alcohol abuse, Alcohol-induced persisting dementia ([address] Arthritis, Asthma, COPD (chronic obstructive pulmonary disease) ([address] History of breast cancer, Hx of blood clots, HX OTHER MEDICAL, Hyperlipidemia, Hypertension, Iliac artery aneurysm, bilateral ([address] On home oxygen therapy, Osteopenia, Peripheral vascular disease ([address] and Presbyopia.   Past surgical history:   has a past surgical history that includes Breast lumpectomy (12-06); Dental surgery; Hysterectomy, total abdominal (1980's); femoral bypass (4-09); Abdomen surgery (1970's); Colonoscopy (2011, 8-12); fracture surgery (Early 1980's); AAA repair, endovascular (11/13/2012); and Breast biopsy (5/2013). Social History:   reports that she quit smoking about a year ago. Her smoking use included cigarettes. She has a 61.00 pack-year smoking history. She has never used smokeless tobacco. She reports current alcohol use. She reports that she does not use drugs. Family history:  family history includes Colon Cancer in her father; Coronary Art Dis in her brother and sister; Early Death in her brother, brother, and brother; Early Death (age of onset: 1) in her son; Early Death (age of onset: 61) in her mother; Heart Attack in her brother; Heart Disease in her brother and sister; High Blood Pressure in her sister; High Cholesterol in her sister; Kidney Disease in her sister; Other in her daughter and sister; Vision Loss in her sister. OBJECTIVE:     BP (!) 162/102   Pulse 85   Temp 98.5 °F (36.9 °C) (Oral)   Resp 20   Ht 5' 3\" (1.6 m)   Wt 102 lb (46.3 kg)   LMP  (LMP Unknown)   SpO2 99%   BMI 18.07 kg/m²       Intake/Output Summary (Last 24 hours) at 9/8/2021 1325  Last data filed at 9/8/2021 0523  Gross per 24 hour   Intake --   Output 300 ml   Net -300 ml       Physical Exam:    Constitutional:  Well developed, Well nourished, No acute distress, Non-toxic appearance. HENT:  Normocephalic, Atraumatic, Bilateral external ears normal, Oropharynx moist, No oral exudates, Nose normal. Neck- Normal range of motion, No tenderness, Supple, No stridor. Eyes:  EOMI, Conjunctiva normal, No discharge. Respiratory:  Normal breath sounds, mild respiratory distress, No wheezing, No chest tenderness. ,no use of accessory muscles, diaphragm movement is normal  Cardiovascular S1-S2 No Murmurs, added sounds. Normal rate rhythm. No rubs gallops. Carotid pulses and amplitude are normal no bruit noted. Pedal pulses normal femoral pulses normal.  No pedal edema  GI:  Bowel sounds normal, Soft, No tenderness  : No CVA tenderness. Musculoskeletal: No edema, No tenderness, No cyanosis, No clubbing. Back- No tenderness. Integument:  Warm, Dry, No erythema, No rash. Lymphatic:  No lymphadenopathy noted. Neurologic:  Alert & oriented x 3, No focal deficits noted. Psychiatric:  Affect normal, Judgment normal, Mood normal.           MEDICATIONS:     magnesium sulfate  2,000 mg IntraVENous Once    vitamin B-6  50 mg Oral Daily    amLODIPine  5 mg Oral Daily    aspirin  325 mg Oral QAM    folic acid  1 mg Oral Daily    clopidogrel  75 mg Oral Daily    atorvastatin  80 mg Oral Nightly    therapeutic multivitamin-minerals  1 tablet Oral Daily    polycarbophil  625 mg Oral Daily    donepezil  10 mg Oral Nightly    thiamine  100 mg Oral Daily    budesonide  500 mcg Nebulization BID    predniSONE  10 mg Oral Daily    sodium chloride flush  5-40 mL IntraVENous 2 times per day    enoxaparin  40 mg SubCUTAneous Daily    furosemide  20 mg Oral Daily    albuterol sulfate HFA  2 puff Inhalation TID    ipratropium  2 puff Inhalation TID      sodium chloride       albuterol sulfate HFA, sodium chloride flush, sodium chloride, ondansetron **OR** ondansetron, polyethylene glycol, acetaminophen **OR** acetaminophen  Allergies   Allergen Reactions    Pcn [Penicillins] Other (See Comments)     \"Been so long ago, that I forgot. \"    Thiazide-Type Diuretics      low sodium       Lab Data:  CBC:   Recent Labs     09/07/21 0112   WBC 5.1   HGB 10.2*   HCT 32.5*   .2*        BMP:   Recent Labs     09/07/21 0112 09/08/21  0632    143   K 4.7 3.3*    101   CO2 35* 36*   PHOS  --  2.9   BUN 18 13   CREATININE 1.0 0.7     LIVER PROFILE:   Recent Labs     09/07/21 0112   AST 26   ALT 15   LIPASE 24   BILITOT 0.3   ALKPHOS 79 Karthik Estrada MD, MD 9/8/2021 1:25 PM

## 2021-09-08 NOTE — PROGRESS NOTES
Hospitalist Progress Note      Name:  Carroll Martines /Age/Sex: 1940  (78 y.o. female)   MRN & CSN:  [id number] & [id number] Admission Date/Time: 2021 12:27 AM   Location:  O[facility]  PCP: Sheryl Koch MD         Hospital Day: 2    Assessment and Plan:   Carroll Martines is a 78 y.o.  female  who presents with shortness of breath     1. Acute on chronic HFpEF  1. CTA () shows no definite evidence for PE, lobulated soft tissue mass in the middle lobe, PET-CT is recommended for further evaluation   2. Venous doppler bilaterally () Neg   3. BNP-1101  4. Troponin neg x 2   5. Consult cardiology~Oral lasix 20 mg po daily  6. Consult pulmonology~bronchodilators and oral steroids, PET CT outpatient     2. Essential Hypertension         1. Not on any home medications-Bp elevated on admission-start norvasc 5 mg po daily ()        2. Monitor     3. Alcohol induced dementia         HX of alcohol abuse          1. Continue thiamine po         2. Continue aricept po          3. Continue folic acid po     4. Hyperlipidemia         1. Continue home dose of statin     5. PVD         AAA         Iliac artery aneurysm bilaterally         1. Continue plavix         2. Continue ASA          3. Continue statin     Diet ADULT DIET; Regular; Low Sodium (2 gm)   DVT Prophylaxis [x] Lovenox, []  Heparin, [] SCDs, [] Ambulation   GI Prophylaxis [x] PPI,  [] H2 Blocker,  [] Carafate,  [] Diet/Tube Feeds   Code Status Full Code   Disposition OBS     History of Present Illness:     Chief Complaint: Peripheral edema   Carroll Martines is a 78 y.o.  female  who presents with family concerns of persistent peripheral edema. D/W grand daughter who is also caregiver. States 3 days of significant swelling. Not improved with elevation. Associated SOB. Non productive cough. Per grand daughter legs are significantly improved. Patient [place] but denies new symptoms.         Ten point ROS reviewed negative, unless as noted above    Objective: Intake/Output Summary (Last 24 hours) at 9/8/2021 0941  Last data filed at 9/8/2021 0523  Gross per 24 hour   Intake --   Output 1850 ml   Net -1850 ml      Vitals:   Vitals:    09/07/21 2031   BP: (!) 151/85   Pulse: 78   Resp: 17   Temp: 98.5 °F (36.9 °C)   SpO2: 98%     Physical Exam:   GEN Awake female, sitting upright in bed in no apparent distress. Appears given age. EYES Pupils are equally round. No scleral erythema, discharge, or conjunctivitis. HENT Mucous membranes are moist. Oral pharynx without exudates, no evidence of thrush. NECK Supple, no apparent thyromegaly or masses. RESP Clear to auscultation, no wheezes, rales or rhonchi. Symmetric chest movement while on room air. CARDIO/VASC S1/S2 auscultated. Regular rate without appreciable murmurs, rubs, or gallops. No JVD or carotid bruits. Peripheral pulses equal bilaterally and palpable. trace peripheral edema. GI Abdomen is soft without significant tenderness, masses, or guarding. Bowel sounds are normoactive. Rectal exam deferred.  No costovertebral angle tenderness. Normal appearing external genitalia. [place] catheter is not present. HEME/LYMPH No palpable cervical lymphadenopathy and no hepatosplenomegaly. No petechiae or ecchymoses. MSK No gross joint deformities. SKIN Normal coloration, warm, dry. NEURO Cranial nerves appear grossly intact, normal speech, no lateralizing weakness. PSYCH Awake, alert, oriented x 4. Affect appropriate.     Medications:   Medications:    magnesium sulfate  2,000 mg IntraVENous Once    potassium chloride  60 mEq Oral Once    vitamin B-6  50 mg Oral Daily    amLODIPine  5 mg Oral Daily    aspirin  325 mg Oral QAM    folic acid  1 mg Oral Daily    clopidogrel  75 mg Oral Daily    atorvastatin  80 mg Oral Nightly    therapeutic multivitamin-minerals  1 tablet Oral Daily    polycarbophil  625 mg Oral Daily    donepezil  10 mg Oral Nightly    thiamine  100 mg Oral Daily    budesonide  500 mcg Nebulization BID    predniSONE  10 mg Oral Daily    sodium chloride flush  5-40 mL IntraVENous 2 times per day    enoxaparin  40 mg SubCUTAneous Daily    furosemide  20 mg Oral Daily    albuterol sulfate HFA  2 puff Inhalation TID    ipratropium  2 puff Inhalation TID      Infusions:    sodium chloride       PRN Meds: albuterol sulfate HFA, 2 puff, Q4H PRN  sodium chloride flush, 5-40 mL, PRN  sodium chloride, 25 mL, PRN  ondansetron, 4 mg, Q8H PRN   Or  ondansetron, 4 mg, Q6H PRN  polyethylene glycol, 17 g, Daily PRN  acetaminophen, 650 mg, Q6H PRN   Or  acetaminophen, 650 mg, Q6H PRN          Electronically signed by MANSOOR Cardozo CNP on 9/8/2021 at 9:41 AM

## 2021-09-08 NOTE — PROGRESS NOTES
pulmonary      SUBJECTIVE: stable     OBJECTIVE    VITALS:  BP (!) 162/102   Pulse 85   Temp 98.5 °F (36.9 °C) (Oral)   Resp 20   Ht 5' 3\" (1.6 m)   Wt 102 lb (46.3 kg)   LMP  (LMP Unknown)   SpO2 99%   BMI 18.07 kg/m²   HEAD AND FACE EXAM:  No throat injection, no active exudate,no thrush  NECK EXAM;No JVD, no masses, symmetrical  CHEST EXAM; Expansion equal and symmetrical, no masses  LUNG EXAM; Good breath sounds bilaterally. There are expiratory wheezes both lungs, there are crackles at both lung bases  CARDIOVASCULAR EXAM: Positive S1 and S2, no S3 or S4, no clicks ,no murmurs  RIGHT AND LEFT LOWER EXTRIMITY EXAM: No edema, no swelling, no inflamation  CNS EXAM: Alert and oriented X3          LABS   Lab Results   Component Value Date    WBC 5.1 09/07/2021    HGB 10.2 (L) 09/07/2021    HCT 32.5 (L) 09/07/2021    .2 (H) 09/07/2021     09/07/2021     Lab Results   Component Value Date    CREATININE 0.7 09/08/2021    BUN 13 09/08/2021     09/08/2021    K 3.3 (L) 09/08/2021     09/08/2021    CO2 36 (H) 09/08/2021     Lab Results   Component Value Date    INR 0.99 02/16/2017    PROTIME 11.3 02/16/2017          Lab Results   Component Value Date    PHOS 2.9 09/08/2021    PHOS 4.2 08/13/2012      No results for input(s): PH, PO2ART, MPV1AWK, HCO3, BEART, O2SAT in the last 72 hours.       Wt Readings from Last 3 Encounters:   09/07/21 102 lb (46.3 kg)   08/16/21 102 lb (46.3 kg)   05/06/21 102 lb 13.6 oz (46.7 kg)               ASSESMENT  rml mass  rml atx  Ac copd        PLAN  1. cpm  2. o2 as needed    9/8/2021  Renetta Carrero MD, M.D.

## 2021-09-08 NOTE — CONSULTS
Comprehensive Nutrition Assessment    Type and Reason for Visit:  Consult, Patient Education, Initial (heart failure)    Nutrition Recommendations/Plan:   · Continue current diet  · Encourage po intake  · Consider appetite stimulant    Nutrition Assessment:  Pt admitted with SOB. H/O COPD, CHF, alcohol abuse. Pt very [place], so communicated as best we could with writing/written materials. Provided Eating Right With Less Salt (Nutrition Care Manual). Pt states she is eating well on Low 2 gm Sodium Diet. Dislikes oral supplements. Denies recent wt loss. Pt meets criteria for moderate malnutrition. Will continue to folow as moderate nutrition risk. Malnutrition Assessment:  Malnutrition Status: Moderate malnutrition    Context:  Chronic Illness     Findings of the 6 clinical characteristics of malnutrition:  Energy Intake:  Mild decrease in energy intake   Weight Loss:  Unable to assess     Body Fat Loss:   Mild body fat loss Orbital, Buccal region   Muscle Mass Loss:  Mild muscle mass loss Temples (temporalis), Clavicles (pectoralis & deltoids)  Fluid Accumulation:  No significant fluid accumulation Extremities   Strength:   Not measured    Estimated Daily Nutrient Needs:  Energy (kcal):  2185-4711 (30-35 kcal/kg); Weight Used for Energy Requirements:  Current     Protein (g):  55-64 (1.2-1.4 g/kg); Weight Used for Protein Requirements:  Current        Fluid (ml/day):  6645-3802; Method Used for Fluid Requirements:  1 ml/kcal      Nutrition Related Findings:  thiamine, MVI, folic acid, fibercon      Wounds:  None       Current Nutrition Therapies:    ADULT DIET; Regular;  Low Sodium (2 gm)    Anthropometric Measures:  · Height: 5' 3\" (160 cm)  · Current Body Weight: 102 lb (46.3 kg) (stated)   · Admission Body Weight:  (?)    · Usual Body Weight: 94 lb (42.6 kg) (9/30/20)     · Ideal Body Weight: 115 lbs; % Ideal Body Weight 88.7 %   · BMI: 18.1  · BMI Categories: Underweight (BMI less than 22) age over 72

## 2021-09-09 NOTE — PROGRESS NOTES
Patient discharged to home. Discharge instructions and follow up appoints reviewed with patient. Patient verbalizes understanding. All questions answered. IV removed,cath intact, dressing applied.

## 2021-09-09 NOTE — PROGRESS NOTES
pulmonary      SUBJECTIVE: seen earlier and sleeping     OBJECTIVE    VITALS:  /76   Pulse 65   Temp 98.6 °F (37 °C) (Oral)   Resp 16   Ht 5' 3\" (1.6 m)   Wt 106 lb 0.7 oz (48.1 kg)   LMP  (LMP Unknown)   SpO2 98%   BMI 18.78 kg/m²   HEAD AND FACE EXAM:  No throat injection, no active exudate,no thrush  NECK EXAM;No JVD, no masses, symmetrical  CHEST EXAM; Expansion equal and symmetrical, no masses  LUNG EXAM; Good breath sounds bilaterally. There are expiratory wheezes both lungs, there are crackles at both lung bases  CARDIOVASCULAR EXAM: Positive S1 and S2, no S3 or S4, no clicks ,no murmurs  RIGHT AND LEFT LOWER EXTRIMITY EXAM: No edema, no swelling, no inflamation            LABS   Lab Results   Component Value Date    WBC 5.1 09/07/2021    HGB 10.2 (L) 09/07/2021    HCT 32.5 (L) 09/07/2021    .2 (H) 09/07/2021     09/07/2021     Lab Results   Component Value Date    CREATININE 0.7 09/09/2021    BUN 13 09/09/2021     09/09/2021    K 4.6 09/09/2021    CL 98 (L) 09/09/2021    CO2 35 (H) 09/09/2021     Lab Results   Component Value Date    INR 0.99 02/16/2017    PROTIME 11.3 02/16/2017          Lab Results   Component Value Date    PHOS 2.9 09/08/2021    PHOS 4.2 08/13/2012      No results for input(s): PH, PO2ART, AWF1TML, HCO3, BEART, O2SAT in the last 72 hours. Wt Readings from Last 3 Encounters:   09/09/21 106 lb 0.7 oz (48.1 kg)   08/16/21 102 lb (46.3 kg)   05/06/21 102 lb 13.6 oz (46.7 kg)               ASSESMENT  rml mass  rml atx  Ac copd  chf  sev pulm htn        PLAN  1. Bd rx  2.  On steroids  3. o2 as needed    9/9/2021  Corby Farmer MD, M.D.

## 2021-09-09 NOTE — DISCHARGE SUMMARY
Discharge Summary    Name:  Matt Ivy /Age/Sex: 1940  (78 y.o. female)   MRN & CSN:  [id number] & [id number] Admission Date/Time: 2021 12:27 AM   Attending:  No att. providers found Discharging Physician: Jailyn Dozier, [address] [facility] Mert Feliz Course: as outlined below   Matt Ivy is a 78 y.o.  female  who presents with SOB (shortness of breath)    1. Acute on chronic HFpEF  1. CTA () shows no definite evidence for PE, lobulated soft tissue mass in the middle lobe, PET-CT is recommended for further evaluation   2. Venous doppler bilaterally () Neg   3. BNP-1101  4. Troponin neg x 2   5. Consult cardiology~Oral lasix 20 mg po daily  6. Consult pulmonology~bronchodilators and oral steroids, PET CT outpatient      2.   Essential Hypertension         1. Not on any home medications-Bp elevated on admission-start norvasc 5 mg po daily ()        2. Monitor      3. Alcohol induced dementia         HX of alcohol abuse          1. Continue thiamine po         2. Continue aricept po          3. Continue folic acid po      4. Hyperlipidemia         1. Continue home dose of statin      5. PVD         AAA         Iliac artery aneurysm bilaterally         1. Continue plavix         2. Continue ASA          3. Continue statin     The patient expressed appropriate understanding of and agreement with the discharge recommendations, medications, and plan. This patient was seen and examined autonomously  A hospitalist attending physician was available for questions/consultation as needed.      Consults this admission:  IP CONSULT TO HOSPITALIST  IP CONSULT TO GENERAL SURGERY  IP CONSULT TO PULMONOLOGY  IP CONSULT TO CARDIOLOGY  IP CONSULT TO HEART FAILURE NURSE/COORDINATOR  IP CONSULT TO DIETITIAN  IP CONSULT TO PULMONOLOGY    Discharge Instruction:   Follow up appointments: outpatient PET scan, follow up with pulmonology    Primary care physician:  within 2 weeks    Diet:  cardiac diet   Activity: activity as tolerated  Disposition: Discharged to:   [x]Home, [][facility], []SNF, []Acute Rehab, []Hospice   Condition on discharge: Stable    Discharge Medications:      Kike Osuna   Home Medication Instructions OMER:[id number]    Printed on:09/09/21 0940   Medication Information                      albuterol (PROVENTIL) (2.5 MG/3ML) 0.083% nebulizer solution  Take 3 mLs by nebulization 4 times daily             albuterol sulfate HFA (PROAIR HFA) 108 (90 Base) MCG/ACT inhaler  Inhale 2 puffs into the lungs every 4 hours as needed for Wheezing or Shortness of Breath             amLODIPine (NORVASC) 5 MG tablet  Take 1 tablet by mouth daily             aspirin 325 MG tablet  Take 325 mg by mouth every morning. Over The Counter , Last Dose Taken 11-9-12 Due To Scheduled Procedure             atorvastatin (LIPITOR) 80 MG tablet  Take 1 tablet by mouth daily             budesonide (PULMICORT) 0.5 MG/2ML nebulizer suspension  Take 2 mLs by nebulization 2 times daily             calcium carbonate-vitamin D (CALCIUM 600 + D) 600-400 MG-UNIT TABS per tab  Twice daily by mouth             Calcium Polycarbophil (FIBER) 625 MG TABS  Take 1 tablet by mouth daily             clopidogrel (PLAVIX) 75 MG tablet  Take 1 tablet by mouth daily             donepezil (ARICEPT) 10 MG tablet  Take 1 tablet by mouth nightly             folic acid (FOLVITE) 1 MG tablet  Take 1 tablet by mouth daily             furosemide (LASIX) 20 MG tablet  Take 1 tablet by mouth daily             Incontinence Supply Disposable MISC  Change 4 times per day             ipratropium (ATROVENT HFA) 17 MCG/ACT inhaler  Inhale 1 puff into the lungs daily             ipratropium (ATROVENT) 0.02 % nebulizer solution  Take 2.5 mLs by nebulization 4 times daily Nebulize with albuterol             Misc.  Devices (WALL GRAB BAR) MISC  Place by the toilet             Multiple Vitamins-Minerals (THERAPEUTIC MULTIVITAMIN-MINERALS) tablet  Take 1 tablet by mouth daily             naltrexone (DEPADE) 50 MG tablet  TAKE 1 TABLET BY MOUTH ONCE DAILY *EMERGENCY REFILL*             Nebulizers (MINI PLUS NEBULIZER) MISC  Use 4 times per day as needed             OXYGEN  by Nasal route nightly. Oxygen 2 liters per minute             predniSONE (DELTASONE) 10 MG tablet  Take 1 tablet by mouth daily             Spacer/Aero Chamber Mouthpiece MISC  1 each by Does not apply route once as needed (SOB)             thiamine 100 MG tablet  Take 1 tablet by mouth daily             vitamin B-1 (THIAMINE) 100 MG tablet  Take 1 tablet by mouth daily             vitamin B-6 (B-6) 50 MG tablet  Take 1 tablet by mouth daily                 Objective Findings at Discharge:   /76   Pulse 65   Temp 98.6 °F (37 °C) (Oral)   Resp 16   Ht 5' 3\" (1.6 m)   Wt 106 lb 0.7 oz (48.1 kg)   LMP  (LMP Unknown)   SpO2 98%   BMI 18.78 kg/m²            Gen:  awake, alert, cooperative, no apparent distress normal body habitus  Head/Eyes:  Normocephalic atraumatic, EOMI   NECK:   symmetrical, trachea midline  LUNGS: Normal Effort/ symmetry movement   CARDIOVASCULAR:  Normal rate Tele SR. No peripheral edema  ABDOMEN: Non tender, non distended, no HSM noted. MUSCULOSKELETAL: no gross deformities  NEUROLOGIC: Alert and Oriented,  Cranial nerves II-XII are grossly intact. SKIN:  no bruising or bleeding, normal skin color,  no redness  .   BMP/CBC  Recent Labs     09/07/21  0112 09/08/21  0632 09/09/21  0600    143 139   K 4.7 3.3* 4.6    101 98*   CO2 35* 36* 35*   BUN 18 13 13   CREATININE 1.0 0.7 0.7   WBC 5.1  --   --    HCT 32.5*  --   --      --   --        Discharge Time of < 30  minutes    Electronically signed by MANSOOR Lloyd CNP on 9/9/2021 at 9:40 AM

## 2021-09-15 NOTE — TELEPHONE ENCOUNTER
----- Message from Contreras Mc sent at 9/14/2021  9:37 AM EDT -----  Subject: Hospital Follow Up    QUESTIONS  What hospital was the Patient Discharged from? [facility]  Date of Discharge? 2021-09-09  Discharge Location? Home  Reason for hospitalization as patient stated? Admitted 9/7/21 for   Congestive Heart Failure  What question does the patient have, if applicable?   ---------------------------------------------------------------------------  --------------  CALL BACK INFO  What is the best way for the office to contact you? OK to leave message on   voicemail  Preferred Call Back Phone Number? [phone]  ---------------------------------------------------------------------------  --------------  SCRIPT ANSWERS  Relationship to Patient? Other  Representative Name? Anfrea-Granddaughter  Additional information verified (besides Name and Date of Birth)? Address  (Patient requests to see provider urgently. )? No  Do you have any questions for your primary care provider that need to be   answered prior to your appointment? No  (Has the patient been discharged from the hospital within 2 business days   AND does not have a Telephone Encounter  Follow Up From [address] [place] [address]   documented in [address]?)?  Yes

## 2021-09-24 PROBLEM — I50.9 ACUTE ON CHRONIC CONGESTIVE HEART FAILURE (HCC): Status: ACTIVE | Noted: 2021-01-01

## 2021-09-24 NOTE — PATIENT INSTRUCTIONS
PLEASE BRING YOUR MEDICATIONS TO ALL APPOINTMENTS    The diagnoses and medications listed in this after visit summary may not be accurate at the time of check out. Please check MY CHART in 28-48 hours for possible corrections. Late cancellation policy: So that we can better accommodate people who are sick, please give our office 24 hour notice for an appointment cancellation. Thank you. Missed appointments: Your care is very important to us. It is important that you keep your scheduled appointments. Multiple missed appointments will lead to a dismissal from the office. Later arrival policy: If you are more than 10 minutes late for your appointment, you will be asked to re-schedule. Please allow 5-7 business days for paperwork to be processed. It is important that you check your MY Chart messages, as they include appointment reminders, test results, and other important information. If you have forgotten your password, please call [phone]. HERE ARE SOME LIFE CHANGING TIPS      1. Take your blood pressure medications at bedtime to reduce your chance of heart attack or stroke  2. Fever in kids:  Give both Tylenol and Ibuprofen at the same time rather than staggering them   3. Follow these tips to reduce childhood obesity: Reduce unnecessary exposure to antibiotics, consume whole milk instead of skim milk, watch public TV instead of regular TV (less exposure to junk food commercials), and reduce traumatic experiences. 4. 1 egg per day is good for your heart  5. Alternate day fasting does promote weight loss. Skipping breakfast increases your risk of obesity  6. Artificially sweetened drinks increase all cause mortality (strokes, body mass index, cardiovascular disease)  7. Kale consumption can reduce onset of dementia  8. Walking at least 8000 steps per day and resistance exercise 2-3 x per week are good for your heart  9.  Brushing teeth 3 times per day can decrease chance of getting diabetes  10. Antibiotic use is associated with a lifetime increased risk of breast cancer and heart disease.

## 2021-09-24 NOTE — PROGRESS NOTES
To Scheduled Procedure             atorvastatin (LIPITOR) 80 MG tablet  Take 1 tablet by mouth daily             budesonide (PULMICORT) 0.5 MG/2ML nebulizer suspension  Take 2 mLs by nebulization 2 times daily for 180 doses             calcium carbonate-vitamin D (CALCIUM 600 + D) 600-400 MG-UNIT TABS per tab  Twice daily by mouth             clopidogrel (PLAVIX) 75 MG tablet  Take 1 tablet by mouth daily             donepezil (ARICEPT) 10 MG tablet  Take 1 tablet by mouth nightly             folic acid (FOLVITE) 1 MG tablet  Take 1 tablet by mouth daily             furosemide (LASIX) 20 MG tablet  Take 1 tablet by mouth daily             Incontinence Supply Disposable MISC  Change 4 times per day             ipratropium (ATROVENT HFA) 17 MCG/ACT inhaler  Inhale 1 puff into the lungs daily             ipratropium (ATROVENT) 0.02 % nebulizer solution  Take 2.5 mLs by nebulization 4 times daily Nebulize with albuterol             Misc. Devices (WALL GRAB BAR) MISC  Place by the toilet             Multiple Vitamins-Minerals (THERAPEUTIC MULTIVITAMIN-MINERALS) tablet  Take 1 tablet by mouth daily             naltrexone (DEPADE) 50 MG tablet  Take 1 tablet by mouth daily             Nebulizers (MINI PLUS NEBULIZER) MISC  Use 4 times per day as needed             OXYGEN  by Nasal route nightly.  Oxygen 2 liters per minute             potassium chloride (KLOR-CON M) 10 MEQ extended release tablet  Take 1 tablet by mouth daily             predniSONE (DELTASONE) 10 MG tablet  Take 10 mg by mouth daily             pyridoxine (B-6) 50 MG tablet  Take 1 tablet by mouth daily             Spacer/Aero Chamber Mouthpiece MISC  1 each by Does not apply route once as needed (SOB)             thiamine 100 MG tablet  Take 1 tablet by mouth daily             vitamin B-1 (THIAMINE) 100 MG tablet  Take 1 tablet by mouth daily                   Medications marked \"taking\" at this time  Outpatient Medications Marked as Taking for the 9/24/21 encounter (Office Visit) with Marya Freeman MD   Medication Sig Dispense Refill    predniSONE (DELTASONE) 10 MG tablet Take 10 mg by mouth daily      budesonide (PULMICORT) 0.5 MG/2ML nebulizer suspension Take 2 mLs by nebulization 2 times daily for 180 doses 360 mL 3    amLODIPine (NORVASC) 5 MG tablet Take 1 tablet by mouth daily 90 tablet 1    furosemide (LASIX) 20 MG tablet Take 1 tablet by mouth daily 60 tablet 3    potassium chloride (KLOR-CON M) 10 MEQ extended release tablet Take 1 tablet by mouth daily 90 tablet 3    pyridoxine (B-6) 50 MG tablet Take 1 tablet by mouth daily 90 tablet 3    naltrexone (DEPADE) 50 MG tablet Take 1 tablet by mouth daily 90 tablet 0    Multiple Vitamins-Minerals (THERAPEUTIC MULTIVITAMIN-MINERALS) tablet Take 1 tablet by mouth daily 90 tablet 3    folic acid (FOLVITE) 1 MG tablet Take 1 tablet by mouth daily 90 tablet 3    clopidogrel (PLAVIX) 75 MG tablet Take 1 tablet by mouth daily 90 tablet 3    atorvastatin (LIPITOR) 80 MG tablet Take 1 tablet by mouth daily 90 tablet 3    ipratropium (ATROVENT HFA) 17 MCG/ACT inhaler Inhale 1 puff into the lungs daily      Misc.  Devices (WALL GRAB BAR) MISC Place by the toilet 1 each 0    ipratropium (ATROVENT) 0.02 % nebulizer solution Take 2.5 mLs by nebulization 4 times daily Nebulize with albuterol 2.5 mL 3    albuterol (PROVENTIL) (2.5 MG/3ML) 0.083% nebulizer solution Take 3 mLs by nebulization 4 times daily 1080 mL 3    donepezil (ARICEPT) 10 MG tablet Take 1 tablet by mouth nightly 90 tablet 3    thiamine 100 MG tablet Take 1 tablet by mouth daily 90 tablet 3    Incontinence Supply Disposable MISC Change 4 times per day 120 each 11    Nebulizers (MINI PLUS NEBULIZER) MISC Use 4 times per day as needed 1 each 0    Spacer/Aero Chamber Mouthpiece MISC 1 each by Does not apply route once as needed (SOB) 1 each 0    calcium carbonate-vitamin D (CALCIUM 600 + D) 600-400 MG-UNIT TABS per tab Twice daily by mouth (Patient taking differently: 1 tablet Once a daily by mouth) 60 tablet 11    aspirin 325 MG tablet Take 325 mg by mouth every morning. Over The Counter , Last Dose Taken 11-9-12 Due To Scheduled Procedure      OXYGEN by Nasal route nightly. Oxygen 2 liters per minute          Medications patient taking as of now reconciled against medications ordered at time of hospital discharge: Yes    Chief Complaint   Patient presents with   [address] from Hospital    Congestive Heart Failure       History of Present illness - Follow up of Hospital diagnosis(es): acute on chronic Heart failure, lung mass    Inpatient course: Discharge summary reviewed- see chart. Interval history/Current status: doing well. Walking more. Breathing better. Appetite is very good. A comprehensive review of systems was negative except for what was noted in the HPI. Vitals:    09/24/21 0920   BP: 118/70   Site: Left Upper Arm   Position: Sitting   Cuff Size: Medium Adult   Pulse: 60   Temp: 97.2 °F (36.2 °C)   TempSrc: Infrared   SpO2: 95%   Weight: 94 lb 12.8 oz (43 kg)     Body mass index is 16.79 kg/m². Wt Readings from Last 3 Encounters:   09/24/21 94 lb 12.8 oz (43 kg)   09/09/21 106 lb 0.7 oz (48.1 kg)   08/16/21 102 lb (46.3 kg)     BP Readings from Last 3 Encounters:   09/24/21 118/70   09/09/21 122/76   08/24/21 118/70        Physical Exam:  General Appearance: alert, underweight , in no acute distress  Skin: warm and dry, no rash or erythema  Head: normocephalic and atraumatic  Eyes: pupils equal, round, and reactive to light, extraocular eye movements intact, conjunctivae normal  ENT: tympanic membrane, external ear and ear canal normal bilaterally, nose without deformity, nasal mucosa and turbinates normal without polyps  Neck: supple and non-tender without mass, no thyromegaly or thyroid nodules, no cervical lymphadenopathy  Pulmonary/Chest: decreased air exchange.  Mid posterior wheezes   Cardiovascular: normal rate, regular rhythm, normal S1 and S2, no murmurs, rubs, clicks, or gallops, , no carotid bruits  Abdomen: soft, non-tender, non-distended, normal bowel sounds, no masses or organomegaly  Extremities: no cyanosis, clubbing or edema  Musculoskeletal: normal range of motion, no joint swelling, deformity or tenderness  Neurologic:  no cranial nerve deficit,  gait is slow, but steady    Photo: Granddaughter has photo image on her phone as to how patient's legs looks before going into the hospital for heart failure. The feet were extremely edematous. Assessment/Plan:     Diagnosis Orders   1. Hospital discharge follow-up  MD DISCHARGE MEDS RECONCILED W/ CURRENT OUTPATIENT MED LIST   2. Need for influenza vaccination  INFLUENZA, QUADV, ADJUVANTED, 65 YRS =, IM, PF, PREFILL SYR, 0.5ML (FLUAD)   3. Lung mass  PET CT SKULL BASE TO MID THIGH   4. Weight loss     5. Moderate COPD (chronic obstructive pulmonary disease) (HCC)  budesonide (PULMICORT) 0.5 MG/2ML nebulizer suspension   6. History of breast cancer  PET CT SKULL BASE TO MID THIGH   7. Tobacco abuse  PET CT SKULL BASE TO MID THIGH   8. Other nonspecific abnormal finding of lung field   PET CT SKULL BASE TO MID THIGH   9. Alcohol abuse  naltrexone (DEPADE) 50 MG tablet    Multiple Vitamins-Minerals (THERAPEUTIC MULTIVITAMIN-MINERALS) tablet    folic acid (FOLVITE) 1 MG tablet   10. Peripheral vascular disease (HCC)  clopidogrel (PLAVIX) 75 MG tablet    atorvastatin (LIPITOR) 80 MG tablet   11. Functional urinary incontinence     12. Acute on chronic congestive heart failure, unspecified heart failure type [facility])       Patient is doing well from a heart failure standpoint. We will continue the Lasix and potassium. COPD: I think we can stop the prednisone for now. We will see how she does. She seems to be breathing better    A PET scan and CAT scan will be ordered to rule out cancer.   She has a history of breast cancer and is a smoker    Review of chart indicates patient is lost significant amount of weight. This could be in part due to diuresis. I consider giving her an appetite stimulant but the granddaughter said she did not need it. Patient has extremely good appetite. We will see her back in December. Medical Decision Making: high complexity    Time spent working on chart and reviewing hospital notes and meeting with patient and granddaughter 45 minutes.

## 2021-10-05 NOTE — TELEPHONE ENCOUNTER
Patients care giver called stating the patient has enough nebulizer treatments for two more treatments. Patients caregiver stated that she does not want the patient to run out she feels that [address] will not have mail out the medication in time. Caregiver is asking if you could send Budesonide nebulize solution  and Ipratropium nebulizer solution. Caregiver stated [facility] [address] that would be [facility].  Please advise     Thank you

## 2021-10-20 NOTE — TELEPHONE ENCOUNTER
Spoke to granddaughter Holly Shoemaker. She does not recall getting a phone call regarding our office. I mentioned to her that I had left a message on her cell phone October 8 as soon as I got the results of the PET scan. It appears that the patient has a lung cancer. I will refer her to a oncologist.    In the meantime she can keep her appointment with the pulmonologist on November 1.

## 2021-10-21 NOTE — TELEPHONE ENCOUNTER
Dr. Kinjal Lawson office stated that the Patient has an upcoming appointment on 11/1/2021, they will see if Dr. Idolina Prader wants to move the patient up sooner.

## 2021-11-01 NOTE — PROGRESS NOTES
Patient Name:  John Car  Patient :  1940  Patient MRN:  [id number]     Primary Oncologist: Sumi Nava MD  Referring Provider: River Alvarez MD     Date of Service: 11/3/2021      Reason for Consult:  Right lung mass      Chief Complaint:    Chief Complaint   Patient presents with    New Patient        Patient Active Problem List:     Peripheral vascular disease ([address]     Hyperlipidemia     Alcohol abuse     Moderate COPD (chronic obstructive pulmonary disease) ([address]     Osteoporosis     History of breast cancer     Cachectic ([address]     Bilateral hearing loss     Functional urinary incontinence     Unsteady gait     SOB (shortness of breath)     Moderate malnutrition ([address]     Acute on chronic congestive heart failure (HCC)       HPI:    Carli Beltran is a pleasant 52-year-old [facility] American female patient with history of pathological stage T1c, N0, M0 infiltrating ductal carcinoma of the right breast with positive ER/NC and HER-2/ju negative, status post lumpectomy in 2009. She had adjuvant radiation therapy by Dr. Elsie Mike. She was placed on Femara 2.5 milligrams a day. Bone density test in  showed osteopenia, and I recommended that she take vitamin D. Tumor marker was slightly elevated though remained stable. Ultrasound of the kidneys was normal.  Chest x-ray in 2012 showed no acute cardiopulmonary process with background sequelae of old healed granulomatous disease and prior right axillary surgery. In 2012 she was in the hospital and had surgery for the aneurysm by Dr. Lulu Raymond.    Mammogram on 2013 showed increasing microcalcification in the retroareolar portion of the left breast.  Diagnostic mammogram of the left breast on May 1, 2013 showed BI-RADS category 4 mammographically suspicious increasingly pleomorphic microcalcification in the central retroareolar portion of the left breast compared to the prior exam.  Core biopsy of this left breast on May 8, 2013 showed breast tissue with ductal hyperplasia and fibrocystic changes. Blood tests on June 7, 2013 showed slightly elevated CA27-29 at 47.7. CMP was benign except for slightly elevated alkaline phosphatase at 110. Her white cell count was 6.9, hemoglobin 16.5, and platelet 258. Bone density test on November 7, 2013 showed evidence of osteopenia. Tumor marker was fluctuating and the one from December 2013 was 48.9. CMP was within normal limits. White cell was 1.3, hemoglobin 17.2, hematocrit 51.2, platelet 841. She has secondary erythrocytosis, likely from smoking. Bilateral mammogram in December 2013 showed no mammographic evidence of malignancy. Normal interval followup in 12 months was recommended. Blood tests in February 2014 showed normal CBCD, BUN 12, creatinine 0.9, sodium 139, potassium 5.4, calcium 10.7, albumin 4.6, total protein 7.3, AST 24, ALT 20, alk phos 96. She had mild hypercalcemia due to the dehydration. CMP on March 17, 2014 showed normal study. CA 27-29 dropped to 42.8. S  She lost her older sister due to dementia. Blood test on June 18, 2014 showed white cell count of 6.3, hemoglobin 16.7, hematocrit 48.2, platelet 278. CMP was within normal limits. CA 27-29 was 43.9. She uses oxygen at night at home. Blood tests in October 2014 showed white cell count 5.9, hemoglobin 17.1, hematocrit 50, platelet 382. CMP was stable. CA 27-29 was 36. She has secondary erythrocytosis due to the smoking. CA 27-29 was 44.4. Mammogram in December 2014 was stable and negative for malignancy. She was in the hospital in March 2015 due to the altered mental status and neck pain. White cell count was 8.8, hemoglobin 16.7, platelets 282. CMP was stable, with normal LFTs, alk phos, and calcium. Ultrasound of the carotids bilaterally showed 0 to 50 percent stenosis in the internal carotid arteries bilaterally. CT head showed no acute intracranial abnormality.   Chest x-ray showed COPD. Blood test in April 2015 showed stable CA 27-29 of 29.7. Blood in January 2016 revealed normal CBC. CMP was stable. The CA 27-29 was 27.5. Mammogram in February 2016 was negative. Blood test in May 2016 revealed white cell count 4.3, hemoglobin 17.6, hematocrit 55.2, platelet 339, BUN 8, creatinine 0.8. Liver enzymes were within normal limits. CA 27-29 was 33.7. Blood test in December 2016 revealed white cell 4.9, hemoglobin 16.2, hematocrit 48.3, platelet 103. CMP was stable. CA 27-29 was 29.3. Right breast ultrasound in February 2017 revealed stable findings to the right breast.  The radiologist recommended followup right breast mammogram in six months, in August 2017. She was seen by Dr. Marcus Dick due to the peripheral arterial disease. Reportedly, the blood clot was removed and she was placed on aspirin and Plavix. Right breast diagnostic mammogram in October 2017 revealed negative study. Normal interval follow up in four months for bilateral mammogram was recommended. Bone density in February 2018 revealed T score of -1.6 to the L1-L2, T score of -2.1 to the left hip, T score of -2.6 the left femoral neck. This was compared to the previous study in 2016. Mammogram in February 2018 was benign. On November 3, 2021 she was referred for right lung mass suspicious for lung cancer. Last time she was seen at cancer ctr was in February 2018. She stopped taking adjuvant endocrine pill. She lives with grand daughter who is an MA in . 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 She quit smoking in 2020. She was hospitalized in September 2021 due COPD and CHF exacerbation. CTA chest on September 7, 2021 showed  1. No definite scan evidence for pulmonary embolus. 2. Lobulated soft tissue mass in the middle lobe.  PET-CT is recommended for further evaluation.     CT abdomen and pelvis in September 2021 showed no acute process    PET scan on October 8, 2021 showed  Intensely hypermetabolic right middle lobe mass is highly suspicious for lung cancer, less likely a metastasis.  No other metabolically active disease. She is scheduled for right lung biopsy on November 9, 2021. Family does not want to pursue treatment including radiation therapy. They are leaning toward hospice once diagnosis of malignancy is made. In September 2021 she has macrocytic anemia. Iron was 46, TIBC 268, saturation 17%, folate >20 and B-12 896.2. CMP was grossly unremarkable. No acute pain. Denied any nausea, vomiting or diarrhea. No fever or chills. No chest pain, shortness of breath or palpitation. No headache or dizzy spell. No specific bone pain. No melena or hematochezia. Denied any dysuria or hematuria.     Past Medical History:   Past Medical History:   Diagnosis Date    AAA (abdominal aortic aneurysm) ([address]     Acute on chronic congestive heart failure ([address] 9/24/2021    Alcohol abuse     4 beers daily    Alcohol-induced persisting dementia (HCC)     Arthritis     Asthma     Breast cancer ([address]     COPD (chronic obstructive pulmonary disease) ([address]     History of breast cancer Dx 12-06    Lumpectomy Right Breast For Cancer, Had Radiation Treatments    Hx of blood clots     HX OTHER MEDICAL     Primary Care Physician Is Dr. Othelia Boeck    Hyperlipidemia     high HDL    Hypertension     Iliac artery aneurysm, bilateral ([address]     On home oxygen therapy     At Night 2 Liters Per Nasal Cannula    Osteopenia     Peripheral vascular disease ([address] Dx in 5-2008    Presbyopia 12/10/2019     Past Surgery History:    Past Surgical History:   Procedure Laterality Date    ABDOMEN SURGERY  1970's    \"Surgery For Adhesions\"    ABDOMINAL AORTIC ANEURYSM REPAIR, ENDOVASCULAR  11/13/2012    BREAST BIOPSY  5/2013    benign    BREAST LUMPECTOMY  12-06    Lumpectomy Right Breast For Cancer, Had Radiation  Treatments    [facility]OS[facility]  2011, 8-12    [facility] [place]    DENTAL SURGERY      Teeth Extracted In Past    FEMORAL BYPASS  4-09    \"Both Legs\"    FRACTURE SURGERY  Early 1980's    Broken Left Arm    HYSTERECTOMY, TOTAL ABDOMINAL  1980's     Social History:   She quit smoking in September 2020. She has history of drinking beer. Family History:   She lives with grand daughter. Multiple family members have malignancy. Allergies   Allergen Reactions    Pcn [Penicillins] Other (See Comments)     \"Been so long ago, that I forgot. \"    Thiazide-Type Diuretics      low sodium       Current Outpatient Medications on File Prior to Visit   Medication Sig Dispense Refill    amLODIPine (NORVASC) 5 MG tablet Take 1 tablet by mouth daily 90 tablet 1    furosemide (LASIX) 20 MG tablet Take 1 tablet by mouth daily 60 tablet 3    potassium chloride (KLOR-CON M) 10 MEQ extended release tablet Take 1 tablet by mouth daily 90 tablet 3    pyridoxine (B-6) 50 MG tablet Take 1 tablet by mouth daily 90 tablet 3    naltrexone (DEPADE) 50 MG tablet Take 1 tablet by mouth daily 90 tablet 0    folic acid (FOLVITE) 1 MG tablet Take 1 tablet by mouth daily 90 tablet 3    clopidogrel (PLAVIX) 75 MG tablet Take 1 tablet by mouth daily 90 tablet 3    atorvastatin (LIPITOR) 80 MG tablet Take 1 tablet by mouth daily 90 tablet 3    Misc. Devices (WALL GRAB BAR) MISC Place by the toilet 1 each 0    donepezil (ARICEPT) 10 MG tablet Take 1 tablet by mouth nightly 90 tablet 3    Incontinence Supply Disposable MISC Change 4 times per day 120 each 11    Nebulizers (MINI PLUS NEBULIZER) MISC Use 4 times per day as needed 1 each 0    calcium carbonate-vitamin D (CALCIUM 600 + D) 600-400 MG-UNIT TABS per tab Twice daily by mouth (Patient taking differently: 1 tablet Once a daily by mouth) 60 tablet 11    OXYGEN by Nasal route nightly. Oxygen 2 liters per minute       No current facility-administered medications on file prior to visit.         Review of Systems:    Constitutional:  + weight loss, No fever, No chills, No night sweats. Energy level low. Eyes:  No diplopia, No transient or permanent loss of vision, No scotomata. ENT / Mouth:  No epistaxis, No dysphagia, No hoarseness, No oral ulcers, No gingival bleeding. No sore throat, No postnasal drip, No nasal drip, No mouth pain, No sinus pain, No tinnitus, Normal hearing. Cardiovascular:  No chest pain, No palpitations, No syncope, No upper extremity edema, No lower extremity edema, No calf discomfort. Respiratory:  No cough. No hemoptysis, No pleurisy, No wheezing, No dyspnea. Breast:  No breast mass, No pain, No nipple discharge, No change in size, No change in shape. Gastrointestinal:  No abdominal pain, No abdominal cramping, No nausea, No vomiting, No constipation, No diarrhea, No hematochezia, No melena, No jaundice, No dyspepsia, No dysphagia. Urinary:  No dysuria, No hematuria, No urinary incontinence. Gynecological:  No vaginal discharge, No suprapubic pain, No abnormal vaginal bleeding. (Female Patients Only)  Musculoskeletal:  No muscle pain, No swollen joints, No joint redness, No bone pain, No spine tenderness. Skin:  No rash, No nodules, No pruritus, No lesions. Neurologic:  + confusion, No seizures, No syncope, No tremor, No speech change, No headache, No hiccups, No abnormal gait, No sensory changes, No weakness. Psychiatric:  Forgetful. Endocrine:  No polyuria, No polydipsia, No hot flashes, No thyroid symptoms. Hematologic:  No epistaxis, No gingival bleeding, No petechiae, No ecchymosis. Lymphatic:  No lymphadenopathy, No lymphedema. Allergy / Immunologic:  No eczema, No frequent mucous infections, No frequent respiratory infections, No recurrent urticarial, No frequent skin infections.      Vital Signs: BP (!) 126/52 (Site: Left Upper Arm, Position: Sitting, Cuff Size: Medium Adult)   Pulse 65   Temp 96.2 °F (35.7 °C) (Infrared)   Resp 16   Ht 5' 4\" (1.626 m)   Wt 102 lb (46.3 kg)   LMP  (LMP Unknown)   SpO2 98%   BMI 17.51 kg/m² Physical Exam:  CONSTITUTIONAL: awake, alert, cooperative, no apparent distress   EYES: pupils equal, round and reactive to light, sclera clear and conjunctiva pallor  ENT: Normocephalic, without obvious abnormality, atraumatic  NECK: supple, symmetrical, no jugular venous distension and no carotid bruits   HEMATOLOGIC/LYMPHATIC: no cervical, supraclavicular or axillary lymphadenopathy   LUNGS: no increased work of breathing and clear to auscultation   BREAST: Status post right breast lumpectomy. CARDIOVASCULAR: regular rate and rhythm, normal S1 and S2, no murmur noted  ABDOMEN: normal bowel sounds x 4, soft, non-distended, non-tender, no masses palpated, no hepatosplenomegaly   MUSCULOSKELETAL: full range of motion noted, tone is normal  NEUROLOGIC: awake, alert. Motor skills grossly intact. SKIN: Normal skin color, texture, turgor and no jaundice.  appears intact   EXTREMITIES: trace LE edema      Labs:  Hematology:  Lab Results   Component Value Date    WBC 5.1 09/07/2021    RBC 3.18 (L) 09/07/2021    HGB 10.2 (L) 09/07/2021    HCT 32.5 (L) 09/07/2021    .2 (H) 09/07/2021    MCH 32.1 (H) 09/07/2021    MCHC 31.4 (L) 09/07/2021    RDW 14.6 09/07/2021     09/07/2021    MPV 9.5 09/07/2021    BANDSPCT 3 (L) 07/07/2015    SEGSPCT 63.2 09/07/2021    EOSRELPCT 2.7 09/07/2021    BASOPCT 0.4 09/07/2021    LYMPHOPCT 17.5 (L) 09/07/2021    MONOPCT 15.4 (H) 09/07/2021    BANDABS 0.15 07/07/2015    SEGSABS 3.3 09/07/2021    EOSABS 0.1 09/07/2021    BASOSABS 0.0 09/07/2021    LYMPHSABS 0.9 09/07/2021    MONOSABS 0.8 09/07/2021    DIFFTYPE AUTOMATED DIFFERENTIAL 09/07/2021    ANISOCYTOSIS 1+ 09/30/2015    POLYCHROM 1+ 06/18/2014    WBCMORP OCCASIONAL 12/19/2016    PLTM FEW LARGE PLATELETS SEEN ON SMEAR. 09/30/2015     Chemistry:  Lab Results   Component Value Date     09/09/2021    K 4.6 09/09/2021    CL 98 (L) 09/09/2021    CO2 35 (H) 09/09/2021    BUN 13 09/09/2021    CREATININE 0.7 09/09/2021 GLUCOSE 84 09/09/2021    CALCIUM 9.3 09/09/2021    PROT 6.1 (L) 09/07/2021    LABALBU 3.8 09/07/2021    BILITOT 0.3 09/07/2021    ALKPHOS 79 09/07/2021    AST 26 09/07/2021    ALT 15 09/07/2021    LABGLOM >60 09/09/2021    GFRAA >60 09/09/2021    AGRATIO 1.8 04/30/2012    PHOS 2.9 09/08/2021    MG 2.2 09/09/2021    POCGLU 117 (H) 10/28/2020     Lab Results   Component Value Date    TSHHS 0.332 09/08/2021     Immunology:  Lab Results   Component Value Date    PROT 6.1 (L) 09/07/2021     Coagulation Panel:  Lab Results   Component Value Date    PROTIME 11.3 02/16/2017    INR 0.99 02/16/2017    APTT 28.0 02/16/2017    DDIMER 1955 (H) 09/30/2020     Anemia Panel:  Lab Results   Component Value Date    Pinky Edu 896.2 09/08/2021    FOLATE >20.0 (H) 09/09/2021     Tumor Markers:  Lab Results   Component Value Date    LABCA2 29.3 12/19/2016        Observations:  No data recorded     Assessment & Plan:  1. History of stage IA right breast cancer, ER/VT positive, HER-2/ju negative, status post lumpectomy in December 2009, followed by adjuvant radiation therapy. She was placed on Femara. Due to the persistently elevated tumor marker, we discussed about the duration of the hormonal therapy and I suggested continuing up to 10 years and she was agreeable to it. Mammogram in February 2016 was negative. Right breast mammogram in August 2017 was negative. Bilateral mammogram in February 2018 was negative. She stopped adjuvant femara in 2018. 2. Bone density in February 2016 revealed osteoporosis. Bone density in February 2018 revealed osteoporosis. 3. She has peripheral arterial disease and had thrombectomy. 4. She has right middle lung lesion. She is scheduled for right lung lesion biopsy on November 9, 2021. She quit smoking in 2020. Family is leaning toward hospice once diagnosis of malignancy is confirmed. 5. She has dementia. 6. She has macrocytic anemia which could be related to EtOH.     Tele visit in 2 - 3 weeks. All of their questions have been answered for today. I have discussed the above stated plan with the patient and they verbalized understanding and agreed with the plan. Thank you for allowing us to participate in this patient's care.

## 2021-11-03 NOTE — PROGRESS NOTES
MA Rooming Questions  Patient: Aurea Richard  MRN: [id number]    Date: 11/3/2021      NP    5. Did the patient have a depression screening completed today?  No    No data recorded     PHQ-9 Given to (if applicable):               PHQ-9 Score (if applicable):                     [] Positive     []  Negative              Does question #9 need addressed (if applicable)                     [] Yes    []  No               Chicho Frey MA

## 2021-11-03 NOTE — TELEPHONE ENCOUNTER
Patients has not received Lasix from [facility] (in transit)  Patient is out of medication.  Patients granddaughter asking for lasix to be called  In to [employer] to cover until mail order comes in

## 2021-11-08 NOTE — TELEPHONE ENCOUNTER
Patient canceled biopsy and would not like to presue any treatment. Patient and family requesting hospice consult.

## 2021-11-19 NOTE — TELEPHONE ENCOUNTER
----- Message from Kash Jim sent at 11/19/2021  3:08 PM EST -----  Subject: Refill Request    QUESTIONS  Name of Medication? Other - Fiber Laxative  Patient-reported dosage and instructions? 1 tablet by mouth daily  How many days do you have left? 0  Preferred Pharmacy? [facility] phone number (if available)? [phone]  ---------------------------------------------------------------------------  --------------  CALL BACK INFO  What is the best way for the office to contact you? OK to leave message on   voicemail  Preferred Call Back Phone Number?  [phone]

## 2021-12-29 NOTE — PATIENT INSTRUCTIONS
Personalized Preventive Plan for Emmanuel Abbott - 12/29/2021  Medicare offers a range of preventive health benefits. Some of the tests and screenings are paid in full while other may be subject to a deductible, co-insurance, and/or copay. Some of these benefits include a comprehensive review of your medical history including lifestyle, illnesses that may run in your family, and various assessments and screenings as appropriate. After reviewing your medical record and screening and assessments performed today your provider may have ordered immunizations, labs, imaging, and/or referrals for you. A list of these orders (if applicable) as well as your Preventive Care list are included within your After Visit Summary for your review. Other Preventive Recommendations:    · A preventive eye exam performed by an eye specialist is recommended every 1-2 years to screen for glaucoma; cataracts, macular degeneration, and other eye disorders. · A preventive dental visit is recommended every 6 months. · Try to get at least 150 minutes of exercise per week or 10,000 steps per day on a pedometer . · Order or download the FREE \"Exercise & Physical Activity: Your Everyday Guide\" from [employer]. Call 6-844.580.6846 or search [employer] online. · You need 7507-2721 mg of calcium and 6831-0116 IU of vitamin D per day. It is possible to meet your calcium requirement with diet alone, but a vitamin D supplement is usually necessary to meet this goal.  · When exposed to the sun, use a sunscreen that protects against both UVA and UVB radiation with an SPF of 30 or greater. Reapply every 2 to 3 hours or after sweating, drying off with a towel, or swimming. · Always wear a seat belt when traveling in a car. Always wear a helmet when riding a bicycle or motorcycle.

## 2021-12-29 NOTE — PROGRESS NOTES
Medicare Annual Wellness Visit  Name: Arvella Cheadle Date: 2021   MRN: [id number] Sex: Female   Age: 78 y.o. Ethnicity: Non- / Non    : 1940 Race: Gloria Stubbs / Zeinab Doug is here for Dipesh LOCKWOOD    Screenings for behavioral, psychosocial and functional/safety risks, and cognitive dysfunction are all negative except as indicated below. These results, as well as other patient data from the [address] form, are documented in Flowsheets linked to this Encounter. Allergies   Allergen Reactions    Pcn [Penicillins] Other (See Comments)     \"Been so long ago, that I forgot. \"    Thiazide-Type Diuretics      low sodium         Prior to Visit Medications    Medication Sig Taking?  Authorizing Provider   dexamethasone (DECADRON) 4 MG tablet Take 4 mg by mouth daily (with breakfast) Yes Historical Provider, MD   albuterol sulfate HFA (PROAIR HFA) 108 (90 Base) MCG/ACT inhaler Inhale 2 puffs into the lungs every 6 hours as needed for Wheezing or Shortness of Breath Yes Criselda Jean MD   ipratropium (ATROVENT) 0.02 % nebulizer solution Take 2.5 mLs by nebulization 4 times daily Nebulize with albuterol Yes Criselda Jean MD   budesonide (PULMICORT) 0.5 MG/2ML nebulizer suspension Take 2 mLs by nebulization 2 times daily Yes Criselda Jean MD   albuterol (PROVENTIL) (2.5 MG/3ML) 0.083% nebulizer solution Take 3 mLs by nebulization 4 times daily Yes Criselda Jean MD   Calcium Polycarbophil (FIBER) 625 MG TABS Take 1 tablet by mouth daily Yes Criselda Jean MD   furosemide (LASIX) 20 MG tablet Take 1 tablet by mouth daily Yes Criselda Jean MD   potassium chloride (KLOR-CON M) 10 MEQ extended release tablet Take 1 tablet by mouth daily Yes Criselda Jean MD   pyridoxine (B-6) 50 MG tablet Take 1 tablet by mouth daily Yes Criselda Jean MD   folic acid (FOLVITE) 1 MG tablet Take 1 tablet by mouth daily Yes Criselda Jean MD   clopidogrel (PLAVIX) 75 MG tablet Take 1 tablet by mouth daily Yes Dougie Crook MD   atorvastatin (LIPITOR) 80 MG tablet Take 1 tablet by mouth daily Yes Douige Crook MD   Misc.  Devices (WALL GRAB BAR) MISC Place by the toilet Yes Dougie Crook MD   donepezil (ARICEPT) 10 MG tablet Take 1 tablet by mouth nightly Yes Dougie Crook MD   Incontinence Supply Disposable MISC Change 4 times per day Yes Dougie Crook MD   Nebulizers (MINI PLUS NEBULIZER) MISC Use 4 times per day as needed Yes Dougie Crook MD   calcium carbonate-vitamin D (CALCIUM 600 + D) 600-400 MG-UNIT TABS per tab Twice daily by mouth  Patient taking differently: 1 tablet Once a daily by mouth Yes Dougie Crook MD   OXYGEN by Nasal route continuous Oxygen 2 liters per minute Yes Christy Camp, MD   furosemide (LASIX) 20 MG tablet Take 1 tablet by mouth daily  Patient not taking: Reported on 12/29/2021  Dougie Crook MD   amLODIPine (NORVASC) 5 MG tablet Take 1 tablet by mouth daily  Patient not taking: Reported on 12/29/2021  Dougie Crook MD   naltrexone (DEPADE) 50 MG tablet Take 1 tablet by mouth daily  Patient not taking: Reported on 12/29/2021  Dougie Crook MD         Past Medical History:   Diagnosis Date    AAA (abdominal aortic aneurysm) ([address]     Acute on chronic congestive heart failure ([address] 9/24/2021    Alcohol abuse     4 beers daily    Alcohol-induced persisting dementia ([address]     Arthritis     Asthma     Breast cancer ([address]     COPD (chronic obstructive pulmonary disease) ([address]     History of breast cancer Dx 12-06    Lumpectomy Right Breast For Cancer, Had Radiation Treatments    Hx of blood clots     HX OTHER MEDICAL     Primary Care Physician Is Dr. Dougie Crook    Hyperlipidemia     high HDL    Hypertension     Iliac artery aneurysm, bilateral ([address]     On home oxygen therapy     At Night 2 Liters Per Nasal Cannula    Osteopenia     Peripheral vascular disease ([address] Dx in 5-2008    Presbyopia 12/10/2019       Past Surgical History:   Procedure Laterality Date    ABDOMEN SURGERY      \"Surgery For Adhesions\"    ABDOMINAL AORTIC ANEURYSM REPAIR, ENDOVASCULAR  2012    BREAST BIOPSY  2013    benign    BREAST LUMPECTOMY      Lumpectomy Right Breast For Cancer, Had Radiation  Treatments    COLONOSCOPY  ,     Mukerjee    DENTAL SURGERY      Teeth Extracted In Past    FEMORAL BYPASS      \"Both Legs\"    FRACTURE SURGERY  Early     Broken Left Arm    HYSTERECTOMY, TOTAL ABDOMINAL           Family History   Problem Relation Age of Onset    Colon Cancer Father     Coronary Art Dis Sister     Heart Disease Sister     High Blood Pressure Sister     High Cholesterol Sister     Coronary Art Dis Brother     Heart Attack Brother          age 76    Heart Disease Brother     Early Death Brother     Other Sister         \"Both Legs Amputated\"    Kidney Disease Sister         \"Kidney Problems\"    Early Death Brother         Suicide- hung himself    Early Death Brother         Murdered in his 42's    Early Death Son 1         in a fire    Early Death Mother 61    Vision Loss Sister         \"Eye Problems\"    Other Daughter         \"Lung Problems\"       CareTeam (Including outside providers/suppliers regularly involved in providing care):   Patient Care Team:  Stephania Harris MD as PCP - General (Family Medicine)  Stephania Harris MD as PCP - [facility] Empaneled Provider  Mango Roland MD as Consulting Physician (Gastroenterology)  Cheko Chauhan MD (Inactive) as Consulting Physician (Nephrology)  Kevin Mcclain MD as Surgeon (General Surgery)  Omi Bautista MD as Consulting Physician (Internal Medicine)  Kamron Garza MD as Surgeon (General Surgery)    Wt Readings from Last 3 Encounters:   21 102 lb (46.3 kg)   10/04/21 95 lb (43.1 kg)   21 94 lb 12.8 oz (43 kg)     There were no vitals filed for this visit. There is no height or weight on file to calculate BMI.     Based upon direct observation of the patient, evaluation of cognition reveals unable to do cognitive due to patient's dementia and diminished hearing. Patient's granddaughter Adria/caregiver answered questions and was given verbal permission by the patient, and was on speaker during the call. Patient's complete Health Risk Assessment and screening values have been reviewed and are found in Flowsheets. The following problems were reviewed today and where indicated follow up appointments were made and/or referrals ordered.     Positive Risk Factor Screenings with Interventions:       Depression:  Depression Unable to Assess: Functional capacity motivation limits accuracy  PHQ-2 Score: 0  PHQ-9 Total Score: 0    Severity:1-4 = minimal depression, 5-9 = mild depression, 10-14 = moderate depression, 15-19 = moderately severe depression, 20-27 = severe depression       Health Habits/Nutrition:  Health Habits/Nutrition  Do you exercise for at least 20 minutes 2-3 times per week?: (!) No  Have you lost any weight without trying in the past 3 months?: No  Do you eat only one meal per day?: No  Have you seen the dentist within the past year?: N/A - wear dentures     Health Habits/Nutrition Interventions:  · Inadequate physical activity:  patient is not ready to increase his/her physical activity level at this time    Hearing/Vision:  No exam data present  Hearing/Vision  Do you or your family notice any trouble with your hearing that hasn't been managed with hearing aids?: (!) Yes (ear canals too narrow)  Do you have difficulty driving, watching TV, or doing any of your daily activities because of your eyesight?: No  Have you had an eye exam within the past year?: (!) No  Hearing/Vision Interventions:  · Hearing concerns:  patient declines any further evaluation/treatment for hearing issues  · Vision concerns:  patient declines any further evaluation/treatment for this issue   · Patient's ear canals are too narrow for hearing aids  · Patient has not had an eye exam due to COVID     ADL:  ADLs  In the past 7 days, did you need help from others to perform any of the following everyday activities? Eating, dressing, grooming, bathing, toileting, or walking/balance?: (!) Walking/Balance,Bathing,Grooming,Dressing  In the past 7 days, did you need help from others to take care of any of the following? Laundry, housekeeping, banking/finances, shopping, telephone use, food preparation, transportation, or taking medications?: (!) Food Preparation,Laundry,Housekeeping,Banking/Finances,Shopping,Transportation,Taking Medications  ADL Interventions:  · Patient declines any further evaluation/treatment for this issue, Granddaughter Scott Grady takes care of ADLs.     Personalized Preventive Plan   Current Health Maintenance Status  Immunization History   Administered Date(s) Administered    Influenza A (E1C3-06) Vaccine IM 12/16/2009    Influenza A (I0H0-10) Vaccine PF IM 12/16/2009    Influenza Virus Vaccine 11/19/2007, 10/27/2008, 11/03/2010, 09/28/2011, 10/31/2013, 09/22/2014, 10/31/2016    Influenza, High Dose (Fluzone 65 yrs and older) 10/10/2012, 10/31/2013, 09/22/2014, 10/06/2015, 10/31/2016, 11/14/2017, 10/02/2018    Influenza, Quadv, adjuvanted, 65 yrs +, IM, PF (Fluad) 11/09/2020, 09/24/2021    Influenza, Triv, inactivated, subunit, adjuvanted, IM (Fluad 65 yrs and older) 10/08/2019    Pneumococcal Conjugate 13-valent ([id number]) 06/23/2016    Pneumococcal Polysaccharide ([id number]) 10/31/2006    Tdap (Boostrix, Adacel) 12/10/2008        Health Maintenance   Topic Date Due    COVID-19 Vaccine (1) Never done    Shingles Vaccine (1 of 2) Never done    DTaP/Tdap/Td vaccine (2 - Td or Tdap) 12/10/2018    Annual Wellness Visit (AWV)  10/29/2021    Lipid screen  09/08/2022    Potassium monitoring  09/09/2022    Creatinine monitoring  09/09/2022    DEXA (modify frequency per FRAX score)  Completed    Flu vaccine  Completed    Pneumococcal 65+ years Vaccine  Completed    Hepatitis A vaccine  Aged Out    Hepatitis B vaccine  Aged Out    Hib vaccine  Aged Out    Meningococcal (ACWY) vaccine  Aged Out     Recommendations for [employer] Due: see orders and patient instructions/AVS. Patient declines vaccinations due. Unable to obtain 3 vital signs due to patient not having equipment to take blood pressure/temperature. Recommended screening schedule for the next 5-10 years is provided to the patient in written form: see Patient Instructions/AVS.    Jhonny RAMIREZ LPN, 75/[id number]/8271, performed the documented evaluation under the direct supervision of the attending physician. Aurea Richard, was evaluated through a synchronous (real-time) audio encounter. The patient (or guardian if applicable) is aware that this is a billable service. Verbal consent to proceed has been obtained within the past 12 months. The visit was conducted pursuant to the emergency declaration under the [address] [place] [address], [address] [place] [address] authority and the [employer] General Act. Patient identification was verified, and a caregiver was present when appropriate. The patient was located in the state of [place], where the provider was credentialed to provide care. Total time spent for this encounter: Not billed by time    --Jhonny Cintron LPN on 40/51/7603 at 9:58 AM    An electronic signature was used to authenticate this note.

## 2022-01-01 ENCOUNTER — APPOINTMENT (OUTPATIENT)
Dept: CT IMAGING | Age: 82
DRG: 872 | End: 2022-01-01
Payer: MEDICARE

## 2022-01-01 ENCOUNTER — TELEPHONE (OUTPATIENT)
Dept: FAMILY MEDICINE CLINIC | Age: 82
End: 2022-01-01

## 2022-01-01 ENCOUNTER — HOSPITAL ENCOUNTER (OUTPATIENT)
Age: 82
Setting detail: SPECIMEN
Discharge: HOME | End: 2022-01-11
Payer: COMMERCIAL

## 2022-01-01 ENCOUNTER — HOSPITAL ENCOUNTER (INPATIENT)
Age: 82
LOS: 3 days | Discharge: HOSPICE | DRG: 872 | End: 2022-04-21
Attending: EMERGENCY MEDICINE | Admitting: INTERNAL MEDICINE
Payer: MEDICARE

## 2022-01-01 VITALS
HEART RATE: 66 BPM | TEMPERATURE: 97.7 F | OXYGEN SATURATION: 100 % | RESPIRATION RATE: 16 BRPM | SYSTOLIC BLOOD PRESSURE: 105 MMHG | BODY MASS INDEX: 25.99 KG/M2 | WEIGHT: 132.38 LBS | DIASTOLIC BLOOD PRESSURE: 55 MMHG | HEIGHT: 60 IN

## 2022-01-01 DIAGNOSIS — F10.27 DEMENTIA ASSOCIATED WITH ALCOHOLISM WITHOUT BEHAVIORAL DISTURBANCE (HCC): ICD-10-CM

## 2022-01-01 DIAGNOSIS — R77.8 ELEVATED TROPONIN: ICD-10-CM

## 2022-01-01 DIAGNOSIS — R06.02 SHORTNESS OF BREATH: Primary | ICD-10-CM

## 2022-01-01 DIAGNOSIS — N30.01 ACUTE CYSTITIS WITH HEMATURIA: ICD-10-CM

## 2022-01-01 DIAGNOSIS — R74.01 TRANSAMINITIS: ICD-10-CM

## 2022-01-01 DIAGNOSIS — C34.91 PRIMARY MALIGNANT NEOPLASM OF RIGHT LUNG METASTATIC TO OTHER SITE (HCC): ICD-10-CM

## 2022-01-01 DIAGNOSIS — F10.10 ALCOHOL ABUSE: ICD-10-CM

## 2022-01-01 DIAGNOSIS — Z51.5 HOSPICE CARE PATIENT: ICD-10-CM

## 2022-01-01 DIAGNOSIS — K92.2 GASTROINTESTINAL HEMORRHAGE, UNSPECIFIED GASTROINTESTINAL HEMORRHAGE TYPE: ICD-10-CM

## 2022-01-01 DIAGNOSIS — K59.00 CONSTIPATION, UNSPECIFIED CONSTIPATION TYPE: ICD-10-CM

## 2022-01-01 DIAGNOSIS — D64.9 ANEMIA, UNSPECIFIED TYPE: ICD-10-CM

## 2022-01-01 DIAGNOSIS — I10 ESSENTIAL HYPERTENSION: ICD-10-CM

## 2022-01-01 DIAGNOSIS — J44.9 MODERATE COPD (CHRONIC OBSTRUCTIVE PULMONARY DISEASE) (HCC): ICD-10-CM

## 2022-01-01 LAB
ABO/RH: NORMAL
ACANTHOCYTES: ABNORMAL
ALBUMIN SERPL-MCNC: 2.8 GM/DL (ref 3.4–5)
ALBUMIN SERPL-MCNC: 2.8 GM/DL (ref 3.4–5)
ALBUMIN SERPL-MCNC: 2.9 GM/DL (ref 3.4–5)
ALBUMIN SERPL-MCNC: 3.1 GM/DL (ref 3.4–5)
ALP BLD-CCNC: 107 IU/L (ref 40–129)
ALP BLD-CCNC: 86 IU/L (ref 40–128)
ALP BLD-CCNC: 93 IU/L (ref 40–128)
ALT SERPL-CCNC: 41 U/L (ref 10–40)
ALT SERPL-CCNC: 42 U/L (ref 10–40)
ALT SERPL-CCNC: 51 U/L (ref 10–40)
ANION GAP SERPL CALCULATED.3IONS-SCNC: 10 MMOL/L (ref 4–16)
ANION GAP SERPL CALCULATED.3IONS-SCNC: 11 MMOL/L (ref 4–16)
ANION GAP SERPL CALCULATED.3IONS-SCNC: 11 MMOL/L (ref 4–16)
ANION GAP SERPL CALCULATED.3IONS-SCNC: 15 MMOL/L (ref 4–16)
ANION GAP SERPL CALCULATED.3IONS-SCNC: 15 MMOL/L (ref 4–16)
ANISOCYTOSIS: ABNORMAL
ANTIBODY SCREEN: NEGATIVE
APTT: 18.6 SECONDS (ref 25.1–37.1)
AST SERPL-CCNC: 50 IU/L (ref 15–37)
AST SERPL-CCNC: 57 IU/L (ref 15–37)
AST SERPL-CCNC: 59 IU/L (ref 15–37)
ATYPICAL LYMPHOCYTE ABSOLUTE COUNT: ABNORMAL
BACTERIA: ABNORMAL /HPF
BANDED NEUTROPHILS ABSOLUTE COUNT: 0.12 K/CU MM
BANDED NEUTROPHILS RELATIVE PERCENT: 1 % (ref 5–11)
BASE EXCESS MIXED: 3.4 (ref 0–2.3)
BASOPHILS ABSOLUTE: 0 K/CU MM
BASOPHILS RELATIVE PERCENT: 0.2 % (ref 0–1)
BILIRUB SERPL-MCNC: 0.3 MG/DL (ref 0–1)
BILIRUB SERPL-MCNC: 0.4 MG/DL (ref 0–1)
BILIRUB SERPL-MCNC: 0.4 MG/DL (ref 0–1)
BILIRUBIN DIRECT: 0.2 MG/DL (ref 0–0.3)
BILIRUBIN URINE: NEGATIVE MG/DL
BILIRUBIN, INDIRECT: 0.2 MG/DL (ref 0–0.7)
BLOOD, URINE: ABNORMAL
BUN BLDV-MCNC: 17 MG/DL (ref 6–23)
BUN BLDV-MCNC: 21 MG/DL (ref 6–23)
BUN BLDV-MCNC: 27 MG/DL (ref 6–23)
BUN BLDV-MCNC: 48 MG/DL (ref 6–23)
BUN BLDV-MCNC: 8 MG/DL (ref 6–23)
BURR CELLS: ABNORMAL
CALCIUM SERPL-MCNC: 7.6 MG/DL (ref 8.3–10.6)
CALCIUM SERPL-MCNC: 7.8 MG/DL (ref 8.3–10.6)
CALCIUM SERPL-MCNC: 8.3 MG/DL (ref 8.3–10.6)
CALCIUM SERPL-MCNC: 8.6 MG/DL (ref 8.3–10.6)
CALCIUM SERPL-MCNC: 9.2 MG/DL (ref 8.3–10.6)
CHLORIDE BLD-SCNC: 100 MMOL/L (ref 99–110)
CHLORIDE BLD-SCNC: 100 MMOL/L (ref 99–110)
CHLORIDE BLD-SCNC: 101 MMOL/L (ref 99–110)
CHLORIDE BLD-SCNC: 98 MMOL/L (ref 99–110)
CHLORIDE BLD-SCNC: 99 MMOL/L (ref 99–110)
CLARITY: ABNORMAL
CO2: 19 MMOL/L (ref 21–32)
CO2: 23 MMOL/L (ref 21–32)
CO2: 23 MMOL/L (ref 21–32)
CO2: 25 MMOL/L (ref 21–32)
CO2: 29 MMOL/L (ref 21–32)
COLOR: YELLOW
COMMENT: ABNORMAL
CREAT SERPL-MCNC: 0.3 MG/DL (ref 0.6–1.1)
CREAT SERPL-MCNC: 0.4 MG/DL (ref 0.6–1.1)
CREAT SERPL-MCNC: 0.4 MG/DL (ref 0.6–1.1)
CREAT SERPL-MCNC: 0.5 MG/DL (ref 0.6–1.1)
CREAT SERPL-MCNC: 0.8 MG/DL (ref 0.6–1.1)
CULTURE: ABNORMAL
CULTURE: ABNORMAL
CULTURE: NORMAL
DIFFERENTIAL TYPE: ABNORMAL
EKG ATRIAL RATE: 110 BPM
EKG DIAGNOSIS: NORMAL
EKG P AXIS: 76 DEGREES
EKG P-R INTERVAL: 124 MS
EKG Q-T INTERVAL: 344 MS
EKG QRS DURATION: 84 MS
EKG QTC CALCULATION (BAZETT): 465 MS
EKG R AXIS: 46 DEGREES
EKG T AXIS: 102 DEGREES
EKG VENTRICULAR RATE: 110 BPM
EOSINOPHILS ABSOLUTE: 0 K/CU MM
EOSINOPHILS ABSOLUTE: 0 K/CU MM
EOSINOPHILS ABSOLUTE: 0.1 K/CU MM
EOSINOPHILS ABSOLUTE: 0.1 K/CU MM
EOSINOPHILS RELATIVE PERCENT: 0.4 % (ref 0–3)
EOSINOPHILS RELATIVE PERCENT: 0.5 % (ref 0–3)
EOSINOPHILS RELATIVE PERCENT: 0.7 % (ref 0–3)
EOSINOPHILS RELATIVE PERCENT: 1 % (ref 0–3)
FERRITIN: 232 NG/ML (ref 15–150)
FOLATE: >20 NG/ML (ref 3.1–17.5)
GFR AFRICAN AMERICAN: >60 ML/MIN/1.73M2
GFR NON-AFRICAN AMERICAN: >60 ML/MIN/1.73M2
GLUCOSE BLD-MCNC: 102 MG/DL (ref 70–99)
GLUCOSE BLD-MCNC: 106 MG/DL (ref 70–99)
GLUCOSE BLD-MCNC: 107 MG/DL (ref 70–99)
GLUCOSE BLD-MCNC: 110 MG/DL (ref 70–99)
GLUCOSE BLD-MCNC: 113 MG/DL (ref 70–99)
GLUCOSE BLD-MCNC: 134 MG/DL (ref 70–99)
GLUCOSE BLD-MCNC: 150 MG/DL (ref 70–99)
GLUCOSE BLD-MCNC: 46 MG/DL (ref 70–99)
GLUCOSE BLD-MCNC: 56 MG/DL (ref 70–99)
GLUCOSE BLD-MCNC: 77 MG/DL (ref 70–99)
GLUCOSE BLD-MCNC: 80 MG/DL (ref 70–99)
GLUCOSE BLD-MCNC: 88 MG/DL (ref 70–99)
GLUCOSE BLD-MCNC: 93 MG/DL (ref 70–99)
GLUCOSE, URINE: NEGATIVE MG/DL
HCO3 VENOUS: 30.4 MMOL/L (ref 19–25)
HCT VFR BLD CALC: 23.5 % (ref 37–47)
HCT VFR BLD CALC: 23.7 % (ref 37–47)
HCT VFR BLD CALC: 24 % (ref 37–47)
HCT VFR BLD CALC: 29.4 % (ref 37–47)
HCT VFR BLD CALC: 41 % (ref 37–47)
HEMOCCULT STL QL: POSITIVE
HEMOGLOBIN: 13.7 GM/DL (ref 12.5–16)
HEMOGLOBIN: 7.2 GM/DL (ref 12.5–16)
HEMOGLOBIN: 7.3 GM/DL (ref 12.5–16)
HEMOGLOBIN: 7.6 GM/DL (ref 12.5–16)
HEMOGLOBIN: 9.2 GM/DL (ref 12.5–16)
IMMATURE NEUTROPHIL %: 2.5 % (ref 0–0.43)
IMMATURE NEUTROPHIL %: 3 % (ref 0–0.43)
IMMATURE NEUTROPHIL %: 3 % (ref 0–0.43)
INR BLD: 0.95 INDEX
IRON: 33 UG/DL (ref 37–145)
KETONES, URINE: NEGATIVE MG/DL
LACTATE: 2.7 MMOL/L (ref 0.4–2)
LACTATE: 4.3 MMOL/L (ref 0.4–2)
LEUKOCYTE ESTERASE, URINE: ABNORMAL
LIPASE: 15 IU/L (ref 13–60)
LYMPHOCYTES ABSOLUTE: 0.7 K/CU MM
LYMPHOCYTES ABSOLUTE: 0.8 K/CU MM
LYMPHOCYTES ABSOLUTE: 0.9 K/CU MM
LYMPHOCYTES ABSOLUTE: 2 K/CU MM
LYMPHOCYTES RELATIVE PERCENT: 12.4 % (ref 24–44)
LYMPHOCYTES RELATIVE PERCENT: 17 % (ref 24–44)
LYMPHOCYTES RELATIVE PERCENT: 9.3 % (ref 24–44)
LYMPHOCYTES RELATIVE PERCENT: 9.7 % (ref 24–44)
Lab: ABNORMAL
Lab: NORMAL
MAGNESIUM: 1.8 MG/DL (ref 1.8–2.4)
MAGNESIUM: 1.8 MG/DL (ref 1.8–2.4)
MAGNESIUM: 1.9 MG/DL (ref 1.8–2.4)
MCH RBC QN AUTO: 31.7 PG (ref 27–31)
MCH RBC QN AUTO: 31.9 PG (ref 27–31)
MCH RBC QN AUTO: 32.2 PG (ref 27–31)
MCH RBC QN AUTO: 32.6 PG (ref 27–31)
MCH RBC QN AUTO: 32.7 PG (ref 27–31)
MCHC RBC AUTO-ENTMCNC: 30.6 % (ref 32–36)
MCHC RBC AUTO-ENTMCNC: 30.8 % (ref 32–36)
MCHC RBC AUTO-ENTMCNC: 31.3 % (ref 32–36)
MCHC RBC AUTO-ENTMCNC: 31.7 % (ref 32–36)
MCHC RBC AUTO-ENTMCNC: 33.4 % (ref 32–36)
MCV RBC AUTO: 101.7 FL (ref 78–100)
MCV RBC AUTO: 103.5 FL (ref 78–100)
MCV RBC AUTO: 104.6 FL (ref 78–100)
MCV RBC AUTO: 105.8 FL (ref 78–100)
MCV RBC AUTO: 95.3 FL (ref 78–100)
MONOCYTES ABSOLUTE: 0.4 K/CU MM
MONOCYTES ABSOLUTE: 0.5 K/CU MM
MONOCYTES RELATIVE PERCENT: 3 % (ref 0–4)
MONOCYTES RELATIVE PERCENT: 5.4 % (ref 0–4)
MONOCYTES RELATIVE PERCENT: 5.7 % (ref 0–4)
MONOCYTES RELATIVE PERCENT: 8.7 % (ref 0–4)
MUCUS: ABNORMAL HPF
MYELOCYTE PERCENT: 1 %
MYELOCYTES ABSOLUTE COUNT: 0.12 K/CU MM
NITRITE URINE, QUANTITATIVE: POSITIVE
NUCLEATED RBC %: 1.8 %
NUCLEATED RBC %: 1.9 %
NUCLEATED RBC %: 2.8 %
NUCLEATED RED BLOOD CELLS: 6
O2 SAT, VEN: 53.1 % (ref 50–70)
PCO2, VEN: 55 MMHG (ref 38–52)
PCT TRANSFERRIN: 15 % (ref 10–44)
PDW BLD-RTO: 15.5 % (ref 11.7–14.9)
PDW BLD-RTO: 15.6 % (ref 11.7–14.9)
PDW BLD-RTO: 15.8 % (ref 11.7–14.9)
PH VENOUS: 7.35 (ref 7.32–7.42)
PH, URINE: 5.5 (ref 5–8)
PHOSPHORUS: 2.8 MG/DL (ref 2.5–4.9)
PLATELET # BLD: 148 K/CU MM (ref 140–440)
PLATELET # BLD: 160 K/CU MM (ref 140–440)
PLATELET # BLD: 249 K/CU MM (ref 140–440)
PLATELET # BLD: 354 K/CU MM (ref 140–440)
PLATELET # BLD: 395 K/CU MM (ref 140–440)
PMV BLD AUTO: 9.1 FL (ref 7.5–11.1)
PMV BLD AUTO: 9.3 FL (ref 7.5–11.1)
PMV BLD AUTO: 9.4 FL (ref 7.5–11.1)
PO2, VEN: 29 MMHG (ref 28–48)
POLYCHROMASIA: ABNORMAL
POTASSIUM SERPL-SCNC: 3.1 MMOL/L (ref 3.5–5.1)
POTASSIUM SERPL-SCNC: 3.3 MMOL/L (ref 3.5–5.1)
POTASSIUM SERPL-SCNC: 3.5 MMOL/L (ref 3.5–5.1)
POTASSIUM SERPL-SCNC: 4.5 MMOL/L (ref 3.5–5.1)
POTASSIUM SERPL-SCNC: 4.8 MMOL/L (ref 3.5–5.1)
PRO-BNP: 272.4 PG/ML
PROTEIN UA: NEGATIVE MG/DL
PROTHROMBIN TIME: 12.3 SECONDS (ref 11.7–14.5)
RBC # BLD: 2.24 M/CU MM (ref 4.2–5.4)
RBC # BLD: 2.27 M/CU MM (ref 4.2–5.4)
RBC # BLD: 2.36 M/CU MM (ref 4.2–5.4)
RBC # BLD: 2.81 M/CU MM (ref 4.2–5.4)
RBC # BLD: 4.3 M/CU MM (ref 4.2–5.4)
RBC URINE: 32 /HPF (ref 0–6)
RETICULOCYTE COUNT PCT: 4.8 % (ref 0.2–2.2)
SARS-COV-2, NAAT: NOT DETECTED
SEGMENTED NEUTROPHILS ABSOLUTE COUNT: 4.1 K/CU MM
SEGMENTED NEUTROPHILS ABSOLUTE COUNT: 6.8 K/CU MM
SEGMENTED NEUTROPHILS ABSOLUTE COUNT: 8.2 K/CU MM
SEGMENTED NEUTROPHILS ABSOLUTE COUNT: 9.3 K/CU MM
SEGMENTED NEUTROPHILS RELATIVE PERCENT: 75 % (ref 36–66)
SEGMENTED NEUTROPHILS RELATIVE PERCENT: 77 % (ref 36–66)
SEGMENTED NEUTROPHILS RELATIVE PERCENT: 81.5 % (ref 36–66)
SEGMENTED NEUTROPHILS RELATIVE PERCENT: 81.6 % (ref 36–66)
SODIUM BLD-SCNC: 134 MMOL/L (ref 135–145)
SODIUM BLD-SCNC: 134 MMOL/L (ref 135–145)
SODIUM BLD-SCNC: 137 MMOL/L (ref 135–145)
SOURCE: NORMAL
SPECIFIC GRAVITY UA: 1.01 (ref 1–1.03)
SPECIMEN: ABNORMAL
SPECIMEN: NORMAL
TOTAL IMMATURE NEUTOROPHIL: 0.16 K/CU MM
TOTAL IMMATURE NEUTOROPHIL: 0.21 K/CU MM
TOTAL IMMATURE NEUTOROPHIL: 0.3 K/CU MM
TOTAL IRON BINDING CAPACITY: 225 UG/DL (ref 250–450)
TOTAL NUCLEATED RBC: 0.1 K/CU MM
TOTAL NUCLEATED RBC: 0.2 K/CU MM
TOTAL NUCLEATED RBC: 0.3 K/CU MM
TOTAL PROTEIN: 4.3 GM/DL (ref 6.4–8.2)
TOTAL PROTEIN: 4.5 GM/DL (ref 6.4–8.2)
TOTAL PROTEIN: 5.8 GM/DL (ref 6.4–8.2)
TRICHOMONAS: ABNORMAL /HPF
TROPONIN T: 0.02 NG/ML
TROPONIN T: 0.03 NG/ML
TROPONIN T: 0.04 NG/ML
UNSATURATED IRON BINDING CAPACITY: 192 UG/DL (ref 110–370)
UROBILINOGEN, URINE: 0.2 MG/DL (ref 0.2–1)
VITAMIN B-12: 526.9 PG/ML (ref 211–911)
WBC # BLD: 10 K/CU MM (ref 4–10.5)
WBC # BLD: 10.5 K/CU MM (ref 4–10.5)
WBC # BLD: 12 K/CU MM (ref 4–10.5)
WBC # BLD: 5.4 K/CU MM (ref 4–10.5)
WBC # BLD: 8.4 K/CU MM (ref 4–10.5)
WBC UA: 46 /HPF (ref 0–5)

## 2022-01-01 PROCEDURE — 93005 ELECTROCARDIOGRAM TRACING: CPT | Performed by: EMERGENCY MEDICINE

## 2022-01-01 PROCEDURE — 1200000000 HC SEMI PRIVATE

## 2022-01-01 PROCEDURE — 83735 ASSAY OF MAGNESIUM: CPT

## 2022-01-01 PROCEDURE — 85025 COMPLETE CBC W/AUTO DIFF WBC: CPT

## 2022-01-01 PROCEDURE — 80048 BASIC METABOLIC PNL TOTAL CA: CPT

## 2022-01-01 PROCEDURE — 6370000000 HC RX 637 (ALT 250 FOR IP): Performed by: INTERNAL MEDICINE

## 2022-01-01 PROCEDURE — 87077 CULTURE AEROBIC IDENTIFY: CPT

## 2022-01-01 PROCEDURE — 87635 SARS-COV-2 COVID-19 AMP PRB: CPT

## 2022-01-01 PROCEDURE — 82728 ASSAY OF FERRITIN: CPT

## 2022-01-01 PROCEDURE — 6370000000 HC RX 637 (ALT 250 FOR IP): Performed by: EMERGENCY MEDICINE

## 2022-01-01 PROCEDURE — 87086 URINE CULTURE/COLONY COUNT: CPT

## 2022-01-01 PROCEDURE — 85027 COMPLETE CBC AUTOMATED: CPT

## 2022-01-01 PROCEDURE — 83605 ASSAY OF LACTIC ACID: CPT

## 2022-01-01 PROCEDURE — 86850 RBC ANTIBODY SCREEN: CPT

## 2022-01-01 PROCEDURE — C9113 INJ PANTOPRAZOLE SODIUM, VIA: HCPCS | Performed by: PHYSICIAN ASSISTANT

## 2022-01-01 PROCEDURE — 6360000002 HC RX W HCPCS: Performed by: PHYSICIAN ASSISTANT

## 2022-01-01 PROCEDURE — 83880 ASSAY OF NATRIURETIC PEPTIDE: CPT

## 2022-01-01 PROCEDURE — 2700000000 HC OXYGEN THERAPY PER DAY

## 2022-01-01 PROCEDURE — 36415 COLL VENOUS BLD VENIPUNCTURE: CPT

## 2022-01-01 PROCEDURE — 82248 BILIRUBIN DIRECT: CPT

## 2022-01-01 PROCEDURE — 6370000000 HC RX 637 (ALT 250 FOR IP): Performed by: NURSE PRACTITIONER

## 2022-01-01 PROCEDURE — 82962 GLUCOSE BLOOD TEST: CPT

## 2022-01-01 PROCEDURE — 87040 BLOOD CULTURE FOR BACTERIA: CPT

## 2022-01-01 PROCEDURE — 80069 RENAL FUNCTION PANEL: CPT

## 2022-01-01 PROCEDURE — 83690 ASSAY OF LIPASE: CPT

## 2022-01-01 PROCEDURE — 85045 AUTOMATED RETICULOCYTE COUNT: CPT

## 2022-01-01 PROCEDURE — 2580000003 HC RX 258: Performed by: EMERGENCY MEDICINE

## 2022-01-01 PROCEDURE — 80053 COMPREHEN METABOLIC PANEL: CPT

## 2022-01-01 PROCEDURE — 2580000003 HC RX 258: Performed by: INTERNAL MEDICINE

## 2022-01-01 PROCEDURE — 82607 VITAMIN B-12: CPT

## 2022-01-01 PROCEDURE — 96365 THER/PROPH/DIAG IV INF INIT: CPT

## 2022-01-01 PROCEDURE — 82805 BLOOD GASES W/O2 SATURATION: CPT

## 2022-01-01 PROCEDURE — 85610 PROTHROMBIN TIME: CPT

## 2022-01-01 PROCEDURE — 83550 IRON BINDING TEST: CPT

## 2022-01-01 PROCEDURE — 6370000000 HC RX 637 (ALT 250 FOR IP): Performed by: PHYSICIAN ASSISTANT

## 2022-01-01 PROCEDURE — 2580000003 HC RX 258: Performed by: PHYSICIAN ASSISTANT

## 2022-01-01 PROCEDURE — 94640 AIRWAY INHALATION TREATMENT: CPT

## 2022-01-01 PROCEDURE — 6360000004 HC RX CONTRAST MEDICATION: Performed by: EMERGENCY MEDICINE

## 2022-01-01 PROCEDURE — 84484 ASSAY OF TROPONIN QUANT: CPT

## 2022-01-01 PROCEDURE — 71275 CT ANGIOGRAPHY CHEST: CPT

## 2022-01-01 PROCEDURE — 99285 EMERGENCY DEPT VISIT HI MDM: CPT

## 2022-01-01 PROCEDURE — 85007 BL SMEAR W/DIFF WBC COUNT: CPT

## 2022-01-01 PROCEDURE — 94761 N-INVAS EAR/PLS OXIMETRY MLT: CPT

## 2022-01-01 PROCEDURE — 82272 OCCULT BLD FECES 1-3 TESTS: CPT

## 2022-01-01 PROCEDURE — 96375 TX/PRO/DX INJ NEW DRUG ADDON: CPT

## 2022-01-01 PROCEDURE — 96361 HYDRATE IV INFUSION ADD-ON: CPT

## 2022-01-01 PROCEDURE — 87186 SC STD MICRODIL/AGAR DIL: CPT

## 2022-01-01 PROCEDURE — 93010 ELECTROCARDIOGRAM REPORT: CPT | Performed by: INTERNAL MEDICINE

## 2022-01-01 PROCEDURE — 94664 DEMO&/EVAL PT USE INHALER: CPT

## 2022-01-01 PROCEDURE — 83540 ASSAY OF IRON: CPT

## 2022-01-01 PROCEDURE — 6360000002 HC RX W HCPCS: Performed by: EMERGENCY MEDICINE

## 2022-01-01 PROCEDURE — 86900 BLOOD TYPING SEROLOGIC ABO: CPT

## 2022-01-01 PROCEDURE — 86901 BLOOD TYPING SEROLOGIC RH(D): CPT

## 2022-01-01 PROCEDURE — 81001 URINALYSIS AUTO W/SCOPE: CPT

## 2022-01-01 PROCEDURE — 82746 ASSAY OF FOLIC ACID SERUM: CPT

## 2022-01-01 PROCEDURE — 74177 CT ABD & PELVIS W/CONTRAST: CPT

## 2022-01-01 PROCEDURE — 85730 THROMBOPLASTIN TIME PARTIAL: CPT

## 2022-01-01 RX ORDER — SODIUM CHLORIDE 0.9 % (FLUSH) 0.9 %
10 SYRINGE (ML) INJECTION PRN
Status: DISCONTINUED | OUTPATIENT
Start: 2022-01-01 | End: 2022-01-01 | Stop reason: HOSPADM

## 2022-01-01 RX ORDER — NALTREXONE HYDROCHLORIDE 50 MG/1
50 TABLET, FILM COATED ORAL DAILY
Qty: 30 TABLET | Refills: 0 | OUTPATIENT
Start: 2022-01-01

## 2022-01-01 RX ORDER — PANTOPRAZOLE SODIUM 40 MG/1
40 TABLET, DELAYED RELEASE ORAL
Qty: 90 TABLET | Refills: 1 | Status: SHIPPED | OUTPATIENT
Start: 2022-01-01

## 2022-01-01 RX ORDER — POLYETHYLENE GLYCOL 3350 17 G/17G
17 POWDER, FOR SOLUTION ORAL DAILY PRN
Status: DISCONTINUED | OUTPATIENT
Start: 2022-01-01 | End: 2022-01-01 | Stop reason: HOSPADM

## 2022-01-01 RX ORDER — NICOTINE POLACRILEX 4 MG
15 LOZENGE BUCCAL PRN
Status: DISCONTINUED | OUTPATIENT
Start: 2022-01-01 | End: 2022-01-01 | Stop reason: CLARIF

## 2022-01-01 RX ORDER — MORPHINE SULFATE 20 MG/ML
2.5 SOLUTION ORAL
Status: DISCONTINUED | OUTPATIENT
Start: 2022-01-01 | End: 2022-01-01 | Stop reason: HOSPADM

## 2022-01-01 RX ORDER — DONEPEZIL HYDROCHLORIDE 10 MG/1
TABLET, FILM COATED ORAL
Qty: 30 TABLET | Refills: 3 | OUTPATIENT
Start: 2022-01-01

## 2022-01-01 RX ORDER — DEXTROSE MONOHYDRATE 25 G/50ML
12.5 INJECTION, SOLUTION INTRAVENOUS PRN
Status: DISCONTINUED | OUTPATIENT
Start: 2022-01-01 | End: 2022-01-01 | Stop reason: CLARIF

## 2022-01-01 RX ORDER — SODIUM CHLORIDE 0.9 % (FLUSH) 0.9 %
10 SYRINGE (ML) INJECTION EVERY 12 HOURS SCHEDULED
Status: DISCONTINUED | OUTPATIENT
Start: 2022-01-01 | End: 2022-01-01 | Stop reason: HOSPADM

## 2022-01-01 RX ORDER — SODIUM PHOSPHATE, DIBASIC AND SODIUM PHOSPHATE, MONOBASIC 7; 19 G/133ML; G/133ML
1 ENEMA RECTAL
Status: COMPLETED | OUTPATIENT
Start: 2022-01-01 | End: 2022-01-01

## 2022-01-01 RX ORDER — MORPHINE SULFATE 2 MG/ML
2 INJECTION, SOLUTION INTRAMUSCULAR; INTRAVENOUS ONCE
Status: COMPLETED | OUTPATIENT
Start: 2022-01-01 | End: 2022-01-01

## 2022-01-01 RX ORDER — DONEPEZIL HYDROCHLORIDE 10 MG/1
TABLET, FILM COATED ORAL
Qty: 30 TABLET | Refills: 0 | OUTPATIENT
Start: 2022-01-01

## 2022-01-01 RX ORDER — SODIUM CHLORIDE 9 MG/ML
1000 INJECTION, SOLUTION INTRAVENOUS CONTINUOUS
Status: DISCONTINUED | OUTPATIENT
Start: 2022-01-01 | End: 2022-01-01

## 2022-01-01 RX ORDER — NALTREXONE HYDROCHLORIDE 50 MG/1
50 TABLET, FILM COATED ORAL DAILY
Qty: 30 TABLET | Refills: 10 | OUTPATIENT
Start: 2022-01-01

## 2022-01-01 RX ORDER — FERROUS SULFATE 325(65) MG
325 TABLET ORAL 2 TIMES DAILY
Qty: 180 TABLET | Refills: 1 | Status: SHIPPED | OUTPATIENT
Start: 2022-01-01 | End: 2022-01-01 | Stop reason: ALTCHOICE

## 2022-01-01 RX ORDER — ONDANSETRON 2 MG/ML
4 INJECTION INTRAMUSCULAR; INTRAVENOUS ONCE
Status: COMPLETED | OUTPATIENT
Start: 2022-01-01 | End: 2022-01-01

## 2022-01-01 RX ORDER — ACETAMINOPHEN 650 MG/1
650 SUPPOSITORY RECTAL EVERY 6 HOURS PRN
Status: DISCONTINUED | OUTPATIENT
Start: 2022-01-01 | End: 2022-01-01 | Stop reason: HOSPADM

## 2022-01-01 RX ORDER — PANTOPRAZOLE SODIUM 40 MG/10ML
40 INJECTION, POWDER, LYOPHILIZED, FOR SOLUTION INTRAVENOUS
Status: DISCONTINUED | OUTPATIENT
Start: 2022-01-01 | End: 2022-01-01 | Stop reason: HOSPADM

## 2022-01-01 RX ORDER — ALBUTEROL SULFATE 90 UG/1
2 AEROSOL, METERED RESPIRATORY (INHALATION) ONCE
Status: COMPLETED | OUTPATIENT
Start: 2022-01-01 | End: 2022-01-01

## 2022-01-01 RX ORDER — SODIUM CHLORIDE 9 MG/ML
INJECTION, SOLUTION INTRAVENOUS PRN
Status: DISCONTINUED | OUTPATIENT
Start: 2022-01-01 | End: 2022-01-01 | Stop reason: HOSPADM

## 2022-01-01 RX ORDER — ACETAMINOPHEN 325 MG/1
650 TABLET ORAL EVERY 6 HOURS PRN
Status: DISCONTINUED | OUTPATIENT
Start: 2022-01-01 | End: 2022-01-01 | Stop reason: HOSPADM

## 2022-01-01 RX ORDER — ALBUTEROL SULFATE 90 UG/1
2 AEROSOL, METERED RESPIRATORY (INHALATION) EVERY 4 HOURS PRN
Status: DISCONTINUED | OUTPATIENT
Start: 2022-01-01 | End: 2022-01-01 | Stop reason: HOSPADM

## 2022-01-01 RX ORDER — DEXTROSE MONOHYDRATE 50 MG/ML
100 INJECTION, SOLUTION INTRAVENOUS PRN
Status: DISCONTINUED | OUTPATIENT
Start: 2022-01-01 | End: 2022-01-01 | Stop reason: HOSPADM

## 2022-01-01 RX ADMIN — Medication 2 PUFF: at 15:53

## 2022-01-01 RX ADMIN — CEFTRIAXONE 1000 MG: 1 INJECTION, POWDER, FOR SOLUTION INTRAMUSCULAR; INTRAVENOUS at 08:26

## 2022-01-01 RX ADMIN — MORPHINE SULFATE 2 MG: 2 INJECTION, SOLUTION INTRAMUSCULAR; INTRAVENOUS at 13:58

## 2022-01-01 RX ADMIN — IOPAMIDOL 75 ML: 755 INJECTION, SOLUTION INTRAVENOUS at 16:28

## 2022-01-01 RX ADMIN — PANTOPRAZOLE SODIUM 40 MG: 40 INJECTION, POWDER, FOR SOLUTION INTRAVENOUS at 08:17

## 2022-01-01 RX ADMIN — Medication 2 PUFF: at 19:11

## 2022-01-01 RX ADMIN — METOPROLOL TARTRATE 25 MG: 25 TABLET, FILM COATED ORAL at 20:23

## 2022-01-01 RX ADMIN — ALBUTEROL SULFATE 2 PUFF: 90 AEROSOL, METERED RESPIRATORY (INHALATION) at 23:38

## 2022-01-01 RX ADMIN — ALBUTEROL SULFATE 2 PUFF: 90 AEROSOL, METERED RESPIRATORY (INHALATION) at 19:40

## 2022-01-01 RX ADMIN — Medication 16 G: at 04:04

## 2022-01-01 RX ADMIN — IPRATROPIUM BROMIDE 0.5 MG: 0.5 SOLUTION RESPIRATORY (INHALATION) at 19:42

## 2022-01-01 RX ADMIN — Medication 2 PUFF: at 11:28

## 2022-01-01 RX ADMIN — METOPROLOL TARTRATE 25 MG: 25 TABLET, FILM COATED ORAL at 07:43

## 2022-01-01 RX ADMIN — CEFTRIAXONE 1000 MG: 1 INJECTION, POWDER, FOR SOLUTION INTRAMUSCULAR; INTRAVENOUS at 16:17

## 2022-01-01 RX ADMIN — SODIUM CHLORIDE, PRESERVATIVE FREE 10 ML: 5 INJECTION INTRAVENOUS at 22:04

## 2022-01-01 RX ADMIN — SODIUM CHLORIDE, PRESERVATIVE FREE 10 ML: 5 INJECTION INTRAVENOUS at 08:21

## 2022-01-01 RX ADMIN — PANTOPRAZOLE SODIUM 40 MG: 40 INJECTION, POWDER, FOR SOLUTION INTRAVENOUS at 16:57

## 2022-01-01 RX ADMIN — PANTOPRAZOLE SODIUM 40 MG: 40 INJECTION, POWDER, FOR SOLUTION INTRAVENOUS at 15:40

## 2022-01-01 RX ADMIN — SODIUM CHLORIDE 1000 ML: 9 INJECTION, SOLUTION INTRAVENOUS at 15:41

## 2022-01-01 RX ADMIN — Medication 2 PUFF: at 08:11

## 2022-01-01 RX ADMIN — SODIUM PHOSPHATE, DIBASIC AND SODIUM PHOSPHATE, MONOBASIC 1 ENEMA: 7; 19 ENEMA RECTAL at 13:35

## 2022-01-01 RX ADMIN — Medication 2 PUFF: at 15:16

## 2022-01-01 RX ADMIN — ONDANSETRON 4 MG: 2 INJECTION INTRAMUSCULAR; INTRAVENOUS at 12:15

## 2022-01-01 RX ADMIN — Medication 2 PUFF: at 07:47

## 2022-01-01 RX ADMIN — Medication 2 PUFF: at 12:22

## 2022-01-01 RX ADMIN — METOPROLOL TARTRATE 25 MG: 25 TABLET, FILM COATED ORAL at 02:39

## 2022-01-01 RX ADMIN — Medication 2.5 MG: at 13:38

## 2022-01-01 RX ADMIN — PANTOPRAZOLE SODIUM 40 MG: 40 INJECTION, POWDER, FOR SOLUTION INTRAVENOUS at 22:03

## 2022-01-01 RX ADMIN — SODIUM CHLORIDE, PRESERVATIVE FREE 10 ML: 5 INJECTION INTRAVENOUS at 07:43

## 2022-01-01 RX ADMIN — SODIUM CHLORIDE, PRESERVATIVE FREE 10 ML: 5 INJECTION INTRAVENOUS at 20:24

## 2022-01-01 RX ADMIN — ALBUTEROL SULFATE 2 PUFF: 90 AEROSOL, METERED RESPIRATORY (INHALATION) at 12:02

## 2022-01-01 RX ADMIN — CEFTRIAXONE 1000 MG: 1 INJECTION, POWDER, FOR SOLUTION INTRAMUSCULAR; INTRAVENOUS at 10:20

## 2022-01-01 RX ADMIN — PANTOPRAZOLE SODIUM 40 MG: 40 INJECTION, POWDER, FOR SOLUTION INTRAVENOUS at 07:42

## 2022-01-01 RX ADMIN — PANTOPRAZOLE SODIUM 40 MG: 40 INJECTION, POWDER, FOR SOLUTION INTRAVENOUS at 06:29

## 2022-01-01 RX ADMIN — SODIUM CHLORIDE 1000 ML: 9 INJECTION, SOLUTION INTRAVENOUS at 08:15

## 2022-01-01 RX ADMIN — CEFTRIAXONE 1000 MG: 1 INJECTION, POWDER, FOR SOLUTION INTRAMUSCULAR; INTRAVENOUS at 07:45

## 2022-01-01 RX ADMIN — ALBUTEROL SULFATE 2 PUFF: 90 AEROSOL, METERED RESPIRATORY (INHALATION) at 07:45

## 2022-01-01 RX ADMIN — SODIUM CHLORIDE 1000 ML: 9 INJECTION, SOLUTION INTRAVENOUS at 09:27

## 2022-01-01 RX ADMIN — Medication 2 PUFF: at 19:25

## 2022-01-01 RX ADMIN — SODIUM CHLORIDE, PRESERVATIVE FREE 10 ML: 5 INJECTION INTRAVENOUS at 11:59

## 2022-01-01 RX ADMIN — SODIUM CHLORIDE 1000 ML: 9 INJECTION, SOLUTION INTRAVENOUS at 12:21

## 2022-01-01 RX ADMIN — METOPROLOL TARTRATE 25 MG: 25 TABLET, FILM COATED ORAL at 11:58

## 2022-01-01 ASSESSMENT — PAIN SCALES - GENERAL
PAINLEVEL_OUTOF10: 6
PAINLEVEL_OUTOF10: 0
PAINLEVEL_OUTOF10: 2
PAINLEVEL_OUTOF10: 8
PAINLEVEL_OUTOF10: 0

## 2022-01-01 ASSESSMENT — PAIN - FUNCTIONAL ASSESSMENT
PAIN_FUNCTIONAL_ASSESSMENT: 0-10
PAIN_FUNCTIONAL_ASSESSMENT: PREVENTS OR INTERFERES SOME ACTIVE ACTIVITIES AND ADLS
PAIN_FUNCTIONAL_ASSESSMENT: 0-10

## 2022-01-01 ASSESSMENT — PAIN DESCRIPTION - LOCATION: LOCATION: ABDOMEN;RECTUM

## 2022-01-01 ASSESSMENT — PAIN DESCRIPTION - DESCRIPTORS: DESCRIPTORS: DISCOMFORT;CRAMPING

## 2022-01-01 ASSESSMENT — PAIN DESCRIPTION - ORIENTATION: ORIENTATION: LOWER

## 2022-03-31 NOTE — TELEPHONE ENCOUNTER
Toma [facility] [place] [address] called about patient. She is needing Plan of care Physician Order paper work filled out and faxed back, The patient is in hospice.

## 2022-04-18 PROBLEM — D64.9 ACUTE ANEMIA: Status: ACTIVE | Noted: 2022-01-01

## 2022-04-18 NOTE — ED PROVIDER NOTES
Emergency Department Encounter    Patient: Rosy Tafoya  MRN: [id number]  : 1940  Date of Evaluation: 2022  ED Provider:  Analy Pan MD      Triage Chief Complaint:   Shortness of Breath and Rectal Pain      [place]ha[place]iat[place]:  Rosy Tafoya is a 78 y.o. female that presents to the emergency department with granddaughter and extended family who provide available history. Patient lives in a private home with family. Granddaughter reports that patient woke up in her normal manner early this morning. Granddaughter then gave patient a glass of water, and granddaughter then went for a walk. Granddaughter returned shortly, and patient seemed to be struggling to breathe. She was \"diaphoretic\" and breathing hard. There is a history of COPD, and patient uses 3 L oxygen around-the-clock. Patient and family do not report any precipitating cough, fevers, or chest pain or discomfort. Family is concerned about her history of lung cancer and possible blood clots. There is no reported history of prior blood clots. Patient is also been very constipated over the past 2 weeks. She has not had a satisfactory bowel movement in about 10 days. Patient has told family that it feels like \"there is something up in there. \"  There is no reported blood or mucus in the stools. Patient has been indicating pain in the rectal area, and family is concerned about either an ulcer or hemorrhoid. Patient is currently under the care of    [facility], but granddaughter called that organization prior to presentation and hospice care has been temporarily suspended for today's presentation. Patient is extremely hard of hearing which does limit her ability to contribute to her own history. Patient reports no other particular provocative or alleviating factors. ROS - see HPI, below listed is current ROS at time of my eval:  CONSTITUTIONAL: No fevers, chills, or sweats. EYES: No eye redness or drainage.   HENT: No runny nose or difficulty swallowing. RESPIRATORY: As above. CARDIOVASCULAR: No anginal-type chest pain or edema. GASTROINTESTINAL: As above. No hematochezia, melena, or hematemesis. GENITOURINARY: No frequency, urgency, or dysuria. No hematuria. MUSCULOSKELETAL: No recent injury. No neck, back, or extremity pain. NEUROLOGICAL: No focal weakness, numbness, or tingling. SKIN: No rashes or other lesions reported. No yellowing of the skin.     Medical history:  Past Medical History:   Diagnosis Date    AAA (abdominal aortic aneurysm) ([address]     Acute on chronic congestive heart failure ([address] 9/24/2021    Alcohol abuse     4 beers daily    Alcohol-induced persisting dementia (HCC)     Arthritis     Asthma     Breast cancer ([address]     COPD (chronic obstructive pulmonary disease) ([address]     History of breast cancer Dx 12-06    Lumpectomy Right Breast For Cancer, Had Radiation Treatments    Hx of blood clots     HX OTHER MEDICAL     Primary Care Physician Is Dr. Rach Henson    Hyperlipidemia     high HDL    Hypertension     Iliac artery aneurysm, bilateral ([address]     On home oxygen therapy     At Night 2 Liters Per Nasal Cannula    Osteopenia     Peripheral vascular disease ([address] Dx in 5-2008    Presbyopia 12/10/2019     Past Surgical History:   Procedure Laterality Date    ABDOMEN SURGERY  1970's    \"Surgery For Adhesions\"    ABDOMINAL AORTIC ANEURYSM REPAIR, ENDOVASCULAR  11/13/2012    BREAST BIOPSY  5/2013    benign    BREAST LUMPECTOMY  12-06    Lumpectomy Right Breast For Cancer, Had Radiation  Treatments    COLONOS[place]  2011, 8-12    [place]    DENTAL SURGERY      Teeth Extracted In Past    FEMORAL BYPASS  4-09    \"Both Legs\"    FRACTURE SURGERY  Early 1980's    Broken Left Arm    HYSTERECTOMY, TOTAL ABDOMINAL  1980's     Family History   Problem Relation Age of Onset    Colon Cancer Father     Coronary Art Dis Sister     Heart Disease Sister     High Blood Pressure Sister     High Cholesterol Sister     Coronary Art Dis Brother     Heart Attack Brother          age 76    Heart Disease Brother     Early Death Brother     Other Sister         \"Both Legs Amputated\"    Kidney Disease Sister         \"Kidney Problems\"    Early Death Brother         Suicide- hung himself    Early Death Brother         Murdered in his 42's    Early Death Son 1         in a fire    Early Death Mother 61    Vision Loss Sister         \"Eye Problems\"   Ashly Goodson Other Daughter         \"Lung Problems\"     Social History     Socioeconomic History    Marital status:      Spouse name: Not on file    Number of children: Not on file    Years of education: Not on file    Highest education level: Not on file   Occupational History    Not on file   Tobacco Use    Smoking status: Former Smoker     Packs/day: 1.00     Years: 61.00     Pack years: 61.00     Types: Cigarettes     Quit date: 2020     Years since quittin.5    Smokeless tobacco: Never Used   Substance and Sexual Activity    Alcohol use: Yes     Alcohol/week: 0.0 standard drinks     Comment: \"Beer Every Day\"    Drug use: No    Sexual activity: Never   Other Topics Concern    Not on file   Social History Narrative    Not on file     Social Determinants of Health     Financial Resource Strain: Low Risk     Difficulty of Paying Living Expenses: Not hard at all   Food Insecurity: No Food Insecurity    Worried About Running Out of Food in the Last Year: Never true    [address] in the Last Year: Never true   Transportation Needs:     Lack of Transportation (Medical): Not on file    Lack of Transportation (Non-Medical):  Not on file   Physical Activity:     Days of Exercise per Week: Not on file    Minutes of Exercise per Session: Not on file   Stress:     Feeling of Stress : Not on file   Social Connections:     Frequency of Communication with Friends and Family: Not on file    Frequency of Social Gatherings with Friends and Family: Not on file    Attends Christianity Services: Not on file    Active Member of Clubs or Organizations: Not on file    Attends Club or Organization Meetings: Not on file    Marital Status: Not on file   Intimate Partner Violence:     Fear of Current or Ex-Partner: Not on file    Emotionally Abused: Not on file    Physically Abused: Not on file    Sexually Abused: Not on file   Housing Stability:     Unable to Pay for Housing in the Last Year: Not on file    Number of [place] in the Last Year: Not on file    Unstable Housing in the Last Year: Not on file     Current Facility-Administered Medications   Medication Dose Route Frequency Provider Last Rate Last Admin    metoprolol tartrate (LOPRESSOR) tablet 25 mg  25 mg Oral BID Deepak Suazo MD   25 mg at 04/19/22 0239    0.9 % sodium chloride infusion  1,000 mL IntraVENous Continuous Rachell Dexter MD   Stopped at 04/18/22 2138    morphine 20MG/ML concentrated solution 2.5 mg  2.5 mg Oral Q3H PRN Irlanda Hanna PA-C        sodium chloride flush 0.9 % injection 10 mL  10 mL IntraVENous 2 times per day Eden Tsang PA-C   10 mL at 04/18/22 2204    sodium chloride flush 0.9 % injection 10 mL  10 mL IntraVENous PRN Irlanda Hanna PA-C        0.9 % sodium chloride infusion   IntraVENous PRN Anitha Tsang PA-C        polyethylene glycol (GLYCOLAX) packet 17 g  17 g Oral Daily PRN Eden Tsang PA-C        acetaminophen (TYLENOL) tablet 650 mg  650 mg Oral Q6H PRN Eden Tsang PA-C        Or    acetaminophen (TYLENOL) suppository 650 mg  650 mg Rectal Q6H PRN Eden Tsang PA-C        pantoprazole (PROTONIX) injection 40 mg  40 mg IntraVENous BID AC Eden Tsang PA-C   40 mg at 04/18/22 2203    cefTRIAXone (ROCEPHIN) 1000 mg IVPB in 50 mL D5W minibag  1,000 mg IntraVENous Daily Eden Tsang PA-C        albuterol sulfate  (90 Base) MCG/ACT inhaler 2 puff  2 puff Inhalation Q4H PRN Irlanda Hanna PA-C   2 puff at 04/18/22 1940    ipratropium (ATROVENT HFA) 17 MCG/ACT inhaler 2 puff  2 puff Inhalation 4x daily Kath Pearson MD         Allergies   Allergen Reactions    Pcn [Penicillins] Other (See Comments)     \"Been so long ago, that I forgot. \"    Thiazide-Type Diuretics      low sodium       Nursing Notes Reviewed    Physical Exam:  Triage VS:    ED Triage Vitals   Enc Vitals Group      BP       Pulse       Resp       Temp       Temp src       SpO2       Weight       Height       Head Circumference       Peak Flow       Pain Score       Pain Loc       Pain Edu? Excl. in [address]? My pulse ox interpretation is -99% at triage. GENERAL: Patient is awake, alert, and oriented appropriately to family and staff. Patient is resting comfortably in a still position on the exam table. Patient is extremely hard of hearing, but she does appear to respond appropriately. She speaks in short sentences without distress. HEENT: Normocephalic and atraumatic. Pupils equal, round, and reactive to light. No redness or matting. Bilateral external ears are unremarkable. Nasal mucosa is pink without purulence. Oral mucosa is mildly dry. NECK: Supple with normal range of motion. No Kernig's or Brudzinski sign. No JVD. RESPIRATORY: Moderately diminished aeration, worse on the right. Faint coarse sounds throughout with no wheeze, stridor, rales, rhonchi. Chest wall stable and nontender. CARDIOVASCULAR: Regular tachycardia. No audible murmurs, rubs, or gallops. No central or peripheral cyanosis. GASTROINTESTINAL: Mildly distended with mild tenderness throughout. No McBurney's or Jackson's point tenderness. No other focal tenderness. No involuntary guarding, rebound, or rigidity. No mass or pulsatile mass. Bowel sounds diminished but present throughout. No CVA tenderness. Rectal exam performed with female chaperone present reveals questionable hemorrhoid at about the 7 o'clock position.   No fluctuance or visible fissure. Significant fecal impaction without visible blood on the examining finger. NEUROLOGICAL: Awake, alert and oriented x 3. GCS 15. Cranial nerves III through XII are grossly intact as tested without facial droop or dermatomal paresthesias. Of note, forehead wrinkles are symmetric and intact. Conjugate gaze without entrapment. No asymmetry of the corners of the mouth or nasolabial folds. No gross motor or cerebellar deficits. BACK/MUSCULOSKELETAL: No asymmetric edema or calf tenderness. No Homans sign or cords. SKIN: Normal tone for ethnicity. Normal turgor and brisk capillary refill peripherally. Emergency department course. Patient is brought to bed 20 and assessed and reassessed by me. After initial evaluation, orders are placed for medical screening studies including CBC, metabolic panel, a first lactate, coagulation panel, and urinalysis among others. CT chest PE protocol and CT abdomen pelvis are ordered. Normal saline 125 mL/h is ordered. First albuterol MDI and ondansetron 4 mg are ordered. We have discussed performing rectal exam.  Granddaughter indicates that they contacted [address], and hospice care has been suspended for the time being. Patient is agreeable to continuing plan. As of approximately 1300, a.m. and fecal disimpaction are performed by me. With female chaperone present, patient is placed in a left lateral decubitus position. Patient does have a questionable hemorrhoid at about the 7 o'clock position, but this area does not appear fluctuant or discretely tender. On digital examination, there is obvious fecal impaction with a thick, cl-like brown-colored stool. A large amount of stool was manually removed. There is no gross blood prior to or following the disimpaction. Stool was sent for occult testing. Follow-up enema as ordered. Several medical screening studies are still pending. Upon most recent reevaluation, CT imaging remains pending. Urinalysis is suggestive of infection, so ceftriaxone has been ordered. Care is signed over to Dr. Adeline Garcia. Any addendum will be made as appropriate. Patient seen during [facility], I did don appropriate PPE during my encounters with the patient, including n95 (when appropriate) mask and eye protection as appropriate.     I have reviewed and interpreted all of the currently available lab results from this visit (if applicable):  Results for orders placed or performed during the hospital encounter of 04/18/22   COVID-19, Rapid    Specimen: Nasopharyngeal   Result Value Ref Range    Source UNKNOWN     SARS-CoV-2, NAAT NOT DETECTED NOT DETECTED   CBC with Auto Differential   Result Value Ref Range    WBC 12.0 (H) 4.0 - 10.5 K/CU MM    RBC 2.81 (L) 4.2 - 5.4 M/CU MM    Hemoglobin 9.2 (L) 12.5 - 16.0 GM/DL    Hematocrit 29.4 (L) 37 - 47 %    .6 (H) 78 - 100 FL    MCH 32.7 (H) 27 - 31 PG    MCHC 31.3 (L) 32.0 - 36.0 %    RDW 15.5 (H) 11.7 - 14.9 %    Platelets 615 564 - 542 K/CU MM    MPV 9.3 7.5 - 11.1 FL    Myelocyte Percent 1 (H) 0.0 %    Bands Relative 1 (L) 5 - 11 %    Segs Relative 77.0 (H) 36 - 66 %    Eosinophils % 1.0 0 - 3 %    Lymphocytes % 17.0 (L) 24 - 44 %    Monocytes % 3.0 0 - 4 %    nRBC 6     Myelocytes Absolute 0.12 K/CU MM    Bands Absolute 0.12 K/CU MM    Segs Absolute 9.3 K/CU MM    Eosinophils Absolute 0.1 K/CU MM    Lymphocytes Absolute 2.0 K/CU MM    Monocytes Absolute 0.4 K/CU MM    Differential Type MANUAL DIFFERENTIAL     Anisocytosis 2+     Polychromasia 1+     Kimmie Cells 2+     Acanthocytes 1+     Atypical Lymphocytes Absolute 1+    Basic Metabolic Panel w/ Reflex to MG   Result Value Ref Range    Sodium 137 135 - 145 MMOL/L    Potassium 4.5 3.5 - 5.1 MMOL/L    Chloride 99 99 - 110 mMol/L    CO2 23 21 - 32 MMOL/L    Anion Gap 15 4 - 16    BUN 48 (H) 6 - 23 MG/DL    CREATININE 0.4 (L) 0.6 - 1.1 MG/DL    Glucose 150 (H) 70 - 99 MG/DL    Calcium 8.6 8.3 - 10.6 MG/DL    GFR Non-African American >60 >60 mL/min/1.73m2    GFR African American >60 >60 mL/min/1.73m2   Hepatic Function Panel   Result Value Ref Range    Albumin 3.1 (L) 3.4 - 5.0 GM/DL    Total Bilirubin 0.4 0.0 - 1.0 MG/DL    Bilirubin, Direct 0.2 0.0 - 0.3 MG/DL    Bilirubin, Indirect 0.2 0 - 0.7 MG/DL    Alkaline Phosphatase 107 40 - 129 IU/L    AST 50 (H) 15 - 37 IU/L    ALT 51 (H) 10 - 40 U/L    Total Protein 5.8 (L) 6.4 - 8.2 GM/DL   Troponin   Result Value Ref Range    Troponin T 0.017 (H) <0.01 NG/ML   Brain Natriuretic Peptide   Result Value Ref Range    Pro-.4 <300 PG/ML   Lipase   Result Value Ref Range    Lipase 15 13 - 60 IU/L   Protime-INR   Result Value Ref Range    Protime 12.3 11.7 - 14.5 SECONDS    INR 0.95 INDEX   APTT   Result Value Ref Range    aPTT 18.6 (L) 25.1 - 37.1 SECONDS   Blood Gas, Venous   Result Value Ref Range    pH, [place] 7.35 7.32 - 7.42    pCO2, [place] 55 (H) 38 - 52 mmHG    pO2, [place] 29 28 - 48 mmHG    Base Exc, Mixed 3.4 (H) 0 - 2.3    HCO3, Venous 30.4 (H) 19 - 25 MMOL/L    O2 Sat, [place] 53.1 50 - 70 %    Comment VBG    Urinalysis with Reflex to Culture    Specimen: Urine   Result Value Ref Range    Color, UA YELLOW YELLOW    Clarity, UA SLIGHTLY CLOUDY (A) CLEAR    Glucose, Urine NEGATIVE NEGATIVE MG/DL    Bilirubin Urine NEGATIVE NEGATIVE MG/DL    Ketones, Urine NEGATIVE NEGATIVE MG/DL    Specific Gravity, UA 1.015 1.001 - 1.035    Blood, Urine LARGE (A) NEGATIVE    pH, Urine 5.5 5.0 - 8.0    Protein, UA NEGATIVE NEGATIVE MG/DL    Urobilinogen, Urine 0.2 0.2 - 1.0 MG/DL    Nitrite Urine, Quantitative POSITIVE (A) NEGATIVE    Leukocyte Esterase, Urine MODERATE (A) NEGATIVE    RBC, UA 32 (H) 0 - 6 /HPF    WBC, UA 46 (H) 0 - 5 /HPF    Bacteria, UA MANY (A) NEGATIVE /HPF    Mucus, UA OCCASIONAL (A) NEGATIVE HPF    Trichomonas, UA NONE SEEN NONE SEEN /HPF   Lactic Acid   Result Value Ref Range    Lactate 2.7 (HH) 0.4 - 2.0 mMOL/L   Blood occult stool #1   Result Value Ref Range    OCCULT BLOOD FECAL POSITIVE (A) NEGATIVE   Lactic Acid   Result Value Ref Range    Lactate 4.3 (HH) 0.4 - 2.0 mMOL/L   Troponin   Result Value Ref Range    Troponin T 0.032 (H) <0.01 NG/ML   ANEMIA PANEL   Result Value Ref Range    WBC 10.0 4.0 - 10.5 K/CU MM    RBC 2.36 (L) 4.2 - 5.4 M/CU MM    Hemoglobin 7.6 (L) 12.5 - 16.0 GM/DL    Hematocrit 24.0 (L) 37 - 47 %    .7 (H) 78 - 100 FL    MCH 32.2 (H) 27 - 31 PG    MCHC 31.7 (L) 32.0 - 36.0 %    RDW 15.8 (H) 11.7 - 14.9 %    MPV 9.1 7.5 - 11.1 FL    Differential Type AUTOMATED DIFFERENTIAL     Segs Relative 81.6 (H) 36 - 66 %    Lymphocytes % 9.3 (L) 24 - 44 %    Monocytes % 5.4 (H) 0 - 4 %    Eosinophils % 0.5 0 - 3 %    Basophils % 0.2 0 - 1 %    Segs Absolute 8.2 K/CU MM    Lymphocytes Absolute 0.9 K/CU MM    Monocytes Absolute 0.5 K/CU MM    Eosinophils Absolute 0.1 K/CU MM    Basophils Absolute 0.0 K/CU MM    Nucleated RBC % 2.8 %    Total Nucleated RBC 0.3 K/CU MM    Total Immature Neutrophil 0.30 K/CU MM    Immature Neutrophil % 3.0 (H) 0 - 0.43 %    Folate >20.0 (H) 3.1 - 17.5 NG/ML    Iron 33 (L) 37 - 145 ug/dL    UIBC 192 110 - 370 ug/dL    TIBC 225 (L) 250 - 450 ug/dL    Transferrin % 15 10 - 44 %    Retic Ct Pct 4.8 (H) 0.2 - 2.20 %    Ferritin 232 (H) 15 - 150 NG/ML   Magnesium   Result Value Ref Range    Magnesium 1.9 1.8 - 2.4 mg/dl   Renal Function Panel   Result Value Ref Range    Sodium 137 135 - 145 MMOL/L    Potassium 3.5 3.5 - 5.1 MMOL/L    Chloride 101 99 - 110 mMol/L    CO2 25 21 - 32 MMOL/L    Anion Gap 11 4 - 16    BUN 27 (H) 6 - 23 MG/DL    CREATININE 0.5 (L) 0.6 - 1.1 MG/DL    Glucose 93 70 - 99 MG/DL    Calcium 8.3 8.3 - 10.6 MG/DL    GFR Non-African American >60 >60 mL/min/1.73m2    GFR African American >60 >60 mL/min/1.73m2    Albumin 2.8 (L) 3.4 - 5.0 GM/DL    Phosphorus 2.8 2.5 - 4.9 MG/DL   Troponin   Result Value Ref Range    Troponin T 0.039 (H) <0.01 NG/ML   EKG 12 Lead   Result Value Ref Range    Ventricular Rate 110 BPM Atrial Rate 110 BPM    P-R Interval 124 ms    QRS Duration 84 ms    Q-T Interval 344 ms    QTc Calculation (Bazett) 465 ms    P Axis 76 degrees    R Axis 46 degrees    T Axis 102 degrees    Diagnosis       Sinus tachycardia  Septal infarct (cited on or before 16-AUG-2021)  Abnormal ECG  When compared with ECG of 09-SEP-2021 05:18,  Vent. rate has increased BY  56 BPM  Non-specific change in ST segment in Inferior leads  ST now depressed in Lateral leads  Nonspecific T wave abnormality now evident in Lateral leads     TYPE AND SCREEN   Result Value Ref Range    ABO/Rh O POSITIVE     Antibody Screen NEGATIVE         Radiographs (if obtained):  Radiologist's Report Reviewed:  Pending. EKG:  Twelve-lead EKG obtained at 1047 on 18 April 2022 and interpreted by me in the absence of a cardiologist.  There is no criteria ST elevation or reciprocal change. There are no hyperacute T wave changes. There is no sign of acute ischemia or infarction. This tracing shows a sinus tachycardia. Rate and intervals are 110 beats per minute, MN interval 124 milliseconds, QRS duration 84 milliseconds, QTc interval 465 milliseconds, and R axis normal at 46 degrees. There is no acute change compared with the most recent EKG dated September 9, 2021. Medical decision making:  Patient presents to the emergency department with concerns for increased work of breathing associated with diaphoresis. There is a history of COPD and lung cancer. Family reports no known history of pulmonary embolism. There have been no recent fevers. CT imaging is pending. Patient has been saturating appropriately on supplemental oxygen. She has not required more aggressive airway management. She has been on hospice care, though family called and suspended hospice care prior to this evaluation. There was also report of difficulty having a bowel movement, and she had significant fecal impaction upon presentation. Lactate is elevated.   CT imaging is pending. Procedures: Pending    Consultations: Pending    Initial clinical Impression:  1. Shortness of breath  2. History of COPD  3. History of lung cancer, right-sided  4. Fecal impaction  5. Urinary tract infection  6. Elevated lactic acid  7. Prerenal azotemia    Disposition referral (if applicable):  No follow-up provider specified. Disposition medications (if applicable):  Current Discharge Medication List        ED Provider Disposition Time  DISPOSITION pending      Comment: Please note this report has been produced using speech recognition software and may contain errors related to that system including errors in grammar, punctuation, and spelling, as well as words and phrases that may be inappropriate. Efforts were made to edit the dictations.         Paulo Wilson MD  04/19/22 6099

## 2022-04-18 NOTE — H&P
History and Physical      Name:  Ab Mendieta /Age/Sex: 1940  (78 y.o. female)   MRN & CSN:  [id number] & [id number] Encounter Date/Time: 2022 5:49 PM EDT   Location:  ED[facility]/ED-20 PCP: Mehnaz Nation MD       Hospital Day: 1    Assessment and Plan:     Ab Mendieta is a 78 y.o. female who presents with dyspnea and rectal pain    Medical decision making:     Dyspnea in setting of COPD and known lung cancer  o CTA PE negative  o Tele and pulse ox     Acute anemia likely 2/2 GI Bleed   ? Proctocolitis   Constipation  o S/p enema in ER. CT abdomen/pelvis with questionable proctocolitis. Guaiac positive in ER  o Typical hemoglobin is between 10 and 17 upon trending  o Serial H/H q6hr   o IV PPI BID   o Type and screen. Transfuse PRN to keep Hgb >7, although family is uncertain if they would want transfusion given hospice status  o Hold plavix in setting of GIB  o Consider GI consult in AM if downtrending, however, family agreed patient would not want colonoscopy given hospice status  o Anemia panel pending     Detectable troponin  o Trend, likely secondary to acute anemia. Denies chest pain.      Lactic acidosis  o Trend     Urinary Tract Infection  o Patient symptomatic.  UA spositive  o Urine culture pending  o Started on rocephin, continue  o Trend labs  o Montelongo catheter placed in the ER, discuss removal prior to discharge     Liver lesions, possible metastasis   Mild transaminitis  o In setting of known lung cancer  o Already on hospice at home, granddaughter cares for patient  o CM consult for discharge planning  o Continue liquid morphine 2.5 mg q3h PRN      Chronic conditions: home medications continued unless contraindicated - continuing morphine liquid   AAA s/p surgery in  - hold plavix   Alcohol abuse   CHF    Breast cancer s/p lumpectomy   COPD/Asthma - continue home albuterol and pulmocort and atrovent   Hyperlipidemia   Hypertension   Hx of iliac artery aneurysm   PVD   Dementia    Hard of hearing    Disposition:   Current Living situation: home   Expected Disposition: home  Estimated D/C: 1-2 days    Diet regular   DVT Prophylaxis [] Lovenox, []  Heparin, [x] SCDs, [] Ambulation,  [] Eliquis, [] Xarelto   Code Status DNR CC   Surrogate Decision Maker/ KURT Mitchell     History from:   Patient and EMR and family at bedside  History of Present Illness:   Chief Complaint: Ekta Diaz is a 78 y.o. female with pmh of hypertension, hyperlipidemia, COPD/asthma dementia, PVD,, breast cancer, CHF, alcohol abuse, AAA, lung cancer who presents to the ER for evaluation of altered mental status episode with family. Patient's family is at bedside and states that patient \"slumped over \"while at home and became diaphoretic with difficulty breathing. Patient currently is a hospice patient due to lung cancer and is not currently being treated by an oncologist.  Hospice comes in each week and helps. She has had 3 weeks of debility and decreased appetite and has been unable to walk recently. She also has been complaining of lower abdominal pain and has not had bowel movement in the past week. She is on liquid morphine to help with pain. She did not fully lose consciousness and has improved to her baseline mentation at this time. Patient herself denies chest pain or shortness of breath. She describes \"chronic pain \"and states \"she feels like it is her time \". She is a DNR CC CODE STATUS. Discussed with family that we are limited on what we can offer her given her CODE STATUS but we can observe her overnight and discuss discharging and coordination with hospice in the morning. Patient was given an enema in the ER with bowel movement response. It was guaiac positive. CTA PE was negative. She also said to be anemic. Patient's past medical, social, and family history have been reviewed.     Patient case discussed with ED provider Dr. Amilcar Jose of Systems:    10 point system reviewed, negative, unless as noted in above HPI. Objective:   No intake or output data in the 24 hours ending 04/18/22 1749   Vitals:   Vitals:    04/18/22 1223 04/18/22 1232 04/18/22 1449 04/18/22 1620   BP: (!) 137/110   (!) 158/99   Pulse: 111 111 107 123   Resp: 20   18   Temp: 98.5 °F (36.9 °C)      TempSrc: Oral      SpO2: 100%  100% 98%   Weight: 102 lb (46.3 kg)      Height: 5' (1.524 m)        Medications Prior to Admission     Prior to Admission medications    Medication Sig Start Date End Date Taking?  Authorizing Provider   dexamethasone (DECADRON) 4 MG tablet Take 4 mg by mouth daily (with breakfast)    Historical Provider, MD   albuterol sulfate HFA (PROAIR HFA) 108 (90 Base) MCG/ACT inhaler Inhale 2 puffs into the lungs every 6 hours as needed for Wheezing or Shortness of Breath 12/16/21   Nehemias Martinez MD   ipratropium (ATROVENT) 0.02 % nebulizer solution Take 2.5 mLs by nebulization 4 times daily Nebulize with albuterol 11/26/21 2/24/22  Nehemias Martinez MD   budesonide (PULMICORT) 0.5 MG/2ML nebulizer suspension Take 2 mLs by nebulization 2 times daily 11/26/21 2/24/22  Nehemias Martinez MD   albuterol (PROVENTIL) (2.5 MG/3ML) 0.083% nebulizer solution Take 3 mLs by nebulization 4 times daily 11/26/21 2/24/22  Nehemias Martinez MD   Calcium Polycarbophil (FIBER) 625 MG TABS Take 1 tablet by mouth daily 11/22/21   Nehemias Martinez MD   furosemide (LASIX) 20 MG tablet Take 1 tablet by mouth daily  Patient not taking: Reported on 12/29/2021 11/3/21   Nehemias Martinez MD   amLODIPine (NORVASC) 5 MG tablet Take 1 tablet by mouth daily  Patient not taking: Reported on 12/29/2021 9/24/21   Nehemias Martinez MD   furosemide (LASIX) 20 MG tablet Take 1 tablet by mouth daily 9/24/21   Nehemias Martinez MD   potassium chloride (KLOR-CON M) 10 MEQ extended release tablet Take 1 tablet by mouth daily 9/24/21   Nehemias Martinez MD   pyridoxine (B-6) 50 MG tablet Take 1 tablet by mouth daily 9/24/21 Sylvain Salguero MD   naltrexone (DEPADE) 50 MG tablet Take 1 tablet by mouth daily  Patient not taking: Reported on 12/29/2021 9/24/21   Sylvain Salguero MD   folic acid (FOLVITE) 1 MG tablet Take 1 tablet by mouth daily 9/24/21   Sylvain Salguero MD   clopidogrel (PLAVIX) 75 MG tablet Take 1 tablet by mouth daily 9/24/21   Sylvain Salguero MD   atorvastatin (LIPITOR) 80 MG tablet Take 1 tablet by mouth daily 9/24/21   Sylvain Salguero MD   Misc. Devices (WALL GRAB BAR) MISC Place by the toilet 8/24/21   Sylvain Salguero MD   donepezil (ARICEPT) 10 MG tablet Take 1 tablet by mouth nightly 2/9/21   Sylvain Salguero MD   Incontinence Supply Disposable MISC Change 4 times per day 10/21/20   Sylvain Salguero MD   Nebulizers (MINI PLUS NEBULIZER) MISC Use 4 times per day as needed 7/9/20   Sylvain Salguero MD   calcium carbonate-vitamin D (CALCIUM 600 + D) 600-400 MG-UNIT TABS per tab Twice daily by mouth  Patient taking differently: 1 tablet Once a daily by mouth 3/23/15   Sylvain Salguero MD   OXYGEN by Nasal route continuous Oxygen 2 liters per minute    Historical Provider, MD     Physical Exam:      GEN -Awake. NAD. Appears given age. EYES -PERRLA. No scleral erythema, discharge, or conjunctivitis. HENT -MM are moist. Oral pharynx without exudates, no evidence of thrush. NECK -Supple, no apparent thyromegaly or masses. RESP -CTA, no wheezes, rales or rhonchi. Symmetric chest movement while on nasal cannula oxygen. C/V -S1/S2 auscultated. RRR without appreciable M/R/G. No JVD or carotid bruits. Peripheral pulses equal bilaterally and palpable. Cap refill <3 sec. No peripheral edema. GI -Abdomen is soft non distended and without significant tenderness to palpation. + BS. No masses or guarding.  -No CVA/ flank tenderness. Montelongo catheter is present. LYMPH-No palpable cervical lymphadenopathy and no hepatosplenomegaly. No petechiae or ecchymoses. MS -No gross joint deformities.   SKIN -Normal coloration, warm, dry.  Lady Yordan, alert, oriented x  person, place, time, situation. Cranial nerves appear grossly intact, normal speech, no lateralizing weakness. PSYC- Appropriate affect. Past Medical History:   PMHx   Past Medical History:   Diagnosis Date    [facility]) [facility] [address]     Acute on chronic congestive heart failure ([facility] [address] 9/24/2021    Alcohol abuse     4 beers daily    Alcohol-induced persisting dementia (HCC)     Arthritis     Asthma     Breast cancer ([facility] [address] [facility]     COPD (chronic obstructive pulmonary disease) ([facility] [address] [facility]     History of breast cancer Dx 12-06    Lumpectomy Right Breast For Cancer, Had Radiation Treatments    Hx of blood clots     HX OTHER MEDICAL     Primary Care Physician Is Dr. Sanjeev Wharton    Hyperlipidemia     high HDL    Hypertension     Iliac artery aneurysm, bilateral ([facility] [address]     On home oxygen therapy     At Night 2 Liters Per Nasal Cannula    Osteopenia     Peripheral vascular disease ([facility] [address] Dx in 5-2008    Presbyopia 12/10/2019     PSHX:  has a past surgical history that includes Breast lumpectomy (12-06); Dental surgery; Hysterectomy, total abdominal (1980's); femoral bypass (4-09); Abdomen surgery (1970's); Colonoscopy (2011, 8-12); fracture surgery (Early 1980's); AAA repair, endovascular (11/13/2012); and Breast biopsy (5/2013). Allergies: Allergies   Allergen Reactions    Pcn [Penicillins] Other (See Comments)     \"Been so long ago, that I forgot. \"    Thiazide-Type Diuretics      low sodium     Fam HX: family history includes Colon Cancer in her father; Coronary Art Dis in her brother and sister; Early Death in her brother, brother, and brother; Early Death (age of onset: 1) in her son; Early Death (age of onset: 61) in her mother; Heart Attack in her brother; Heart Disease in her brother and sister; High Blood Pressure in her sister; High Cholesterol in her sister; Kidney Disease in her sister;  Other in her daughter and sister; Vision Loss in her sister. Soc HX:   Social History     Socioeconomic History    Marital status:      Spouse name: None    Number of children: None    Years of education: None    Highest education level: None   Occupational History    None   Tobacco Use    Smoking status: Former Smoker     Packs/day: 1.00     Years: 61.00     Pack years: 61.00     Types: Cigarettes     Quit date: 2020     Years since quittin.5    Smokeless tobacco: Never Used   Substance and Sexual Activity    Alcohol use: Yes     Alcohol/week: 0.0 standard drinks     Comment: \"Beer Every Day\"    Drug use: No    Sexual activity: Never   Other Topics Concern    None   Social History Narrative    None     Social Determinants of Health     Financial Resource Strain: Low Risk     Difficulty of Paying Living Expenses: Not hard at all   Food Insecurity: No Food Insecurity    Worried About Running Out of Food in the Last Year: Never true    Bel of Food in the Last Year: Never true   Transportation Needs:     Lack of Transportation (Medical): Not on file    Lack of Transportation (Non-Medical):  Not on file   Physical Activity:     Days of Exercise per Week: Not on file    Minutes of Exercise per Session: Not on file   Stress:     Feeling of Stress : Not on file   Social Connections:     Frequency of Communication with Friends and Family: Not on file    Frequency of Social Gatherings with Friends and Family: Not on file    Attends Jewish Services: Not on file    Active Member of Clubs or Organizations: Not on file    Attends Club or Organization Meetings: Not on file    Marital Status: Not on file   Intimate Partner Violence:     Fear of Current or Ex-Partner: Not on file    Emotionally Abused: Not on file    Physically Abused: Not on file    Sexually Abused: Not on file   Housing Stability:     Unable to Pay for Housing in the Last Year: Not on file    Number of Places Lived in the Last Year: Not on file    Unstable Housing in the Last Year: Not on file     Medications:   Medications:    Infusions:    sodium chloride 1,000 mL (04/18/22 1221)     PRN Meds:    Labs    CT ABDOMEN PELVIS W IV CONTRAST Additional Contrast? None    Result Date: 4/18/2022  EXAMINATION: CT OF THE ABDOMEN AND PELVIS WITH CONTRAST 4/18/2022 4:29 pm TECHNIQUE: CT of the abdomen and pelvis was performed with the administration of intravenous contrast. Multiplanar reformatted images are provided for review. Dose modulation, iterative reconstruction, and/or weight based adjustment of the mA/kV was utilized to reduce the radiation dose to as low as reasonably achievable. COMPARISON: PET-CT 10/08/2021. CT abdomen and pelvis 09/07/2021. HISTORY: ORDERING SYSTEM PROVIDED HISTORY: Generalized abdominal discomfort and distention, no bowel movement in 10 days, concern for obstruction or ischemia TECHNOLOGIST PROVIDED HISTORY: Additional Contrast?->None Reason for exam:->Generalized abdominal discomfort and distention, no bowel movement in 10 days, concern for obstruction or ischemia Decision Support Exception - unselect if not a suspected or confirmed emergency medical condition->Emergency Medical Condition (MA) Reason for Exam: Generalized abdominal discomfort and distention, no bowel movement in 10 days, concern for obstruction or ischemia Additional signs and symptoms: no Relevant Medical/Surgical History: 75 ml isovue 370 used. FINDINGS: Lower Chest: Partially visualized mass in the right middle lobe. Please see the separate report from the concurrent CT chest for further details. Limited images through the lung bases demonstrate no acute process. Organs: Diffusely scattered low-density lesions are seen in the liver new from the previous exams the largest of which measures approximately 2.5 cm on axial image 22. The gallbladder is unremarkable. No biliary ductal dilatation. The pancreas, spleen, and bilateral adrenal glands are unremarkable.   The bilateral kidneys and ureters are unremarkable. GI/Bowel: No evidence of acute appendicitis. Colonic diverticulosis. Questionable circumferential wall thickening of the distal sigmoid colon and rectum versus decompression. No significant adjacent stranding. The stomach and small bowel are within normal limits. No obstruction. Pelvis: A Montelongo catheter balloon is inflated in the partially decompressed urinary bladder lumen. Status post hysterectomy. No free fluid. No pelvic or inguinal lymphadenopathy. Peritoneum/Retroperitoneum: An aorto bi-iliac stent graft is in place without significant change from 09/10/2021. No extravasation identified. No retroperitoneal or mesenteric lymphadenopathy is identified. No free air or fluid is seen in the abdomen. Bones/Soft Tissues: No acute osseous or soft tissue abnormality. No suspicious lytic or blastic osseous lesion. 1. New scattered low-density lesions throughout the liver compatible with metastases. 2. Partially visualized mass in the right middle lobe. Please see the separate report from the concurrent CT chest for further details. 3. Questionable mild circumferential wall thickening of the distal sigmoid colon and rectum versus underdistention. Proctocolitis is not excluded. CTA PULMONARY W CONTRAST    Result Date: 4/18/2022  EXAMINATION: CTA OF THE CHEST 4/18/2022 4:26 pm TECHNIQUE: CTA of the chest was performed after the administration of intravenous contrast.  Multiplanar reformatted images are provided for review. MIP images are provided for review. Dose modulation, iterative reconstruction, and/or weight based adjustment of the mA/kV was utilized to reduce the radiation dose to as low as reasonably achievable.  COMPARISON: 09/07/2021 HISTORY: ORDERING SYSTEM PROVIDED HISTORY: Shortness of breath, diaphoresis, history of lung cancer, rule out PE or pneumonia TECHNOLOGIST PROVIDED HISTORY: Reason for exam:->Shortness of breath, diaphoresis, history of lung cancer, rule out PE or pneumonia Decision Support Exception - unselect if not a suspected or confirmed emergency medical condition->Emergency Medical Condition (MA) Reason for Exam: Shortness of breath, diaphoresis, history of lung cancer, rule out PE or pneumonia Additional signs and symptoms: no Relevant Medical/Surgical History: 75 ml isovue 370 used. FINDINGS: Pulmonary Arteries: Pulmonary arteries are adequately opacified for evaluation. No evidence of intraluminal filling defect to suggest pulmonary embolism. Main pulmonary artery is normal in caliber. Mediastinum: No evidence of mediastinal lymphadenopathy. The heart and pericardium demonstrate no acute abnormality. There is no acute abnormality of the thoracic aorta. Lungs/pleura: The right middle lobe solid lobulated mass has enlarged and currently measures 61 x 42 mm, previously 31 x 21 mm when remeasured similarly. No other lung mass is identified. There is no pleural effusion or pneumothorax. There is moderate upper lobe emphysema. Upper Abdomen: There are numerous liver metastases. Findings are separately reported. Soft Tissues/Bones: No acute bone or soft tissue abnormality. No pulmonary embolus or acute pulmonary abnormality. Enlarged right middle lobe mass.        CBC:   Recent Labs     04/18/22  1155   WBC 12.0*   HGB 9.2*        BMP:    Recent Labs     04/18/22  1155      K 4.5   CL 99   CO2 23   BUN 48*   CREATININE 0.4*   GLUCOSE 150*     Hepatic:   Recent Labs     04/18/22  1155   AST 50*   ALT 51*   BILITOT 0.4   ALKPHOS 107     Lipids:   Lab Results   Component Value Date    CHOL 208 09/08/2021    CHOL 220 03/23/2015     09/08/2021     04/30/2012    TRIG 43 09/08/2021     Hemoglobin A1C: No results found for: LABA1C  TSH: No results found for: TSH  Troponin:   Lab Results   Component Value Date    TROPONINT 0.017 04/18/2022    TROPONINT <0.010 09/07/2021    TROPONINT <0.010 09/07/2021 Lactic Acid: No results for input(s): LACTA in the last 72 hours. BNP:   Recent Labs     04/18/22  1155   PROBNP 272.4     UA:  Lab Results   Component Value Date    NITRU POSITIVE 04/18/2022    COLORU YELLOW 04/18/2022    WBCUA 46 04/18/2022    RBCUA 32 04/18/2022    MUCUS OCCASIONAL 04/18/2022    TRICHOMONAS NONE SEEN 04/18/2022    BACTERIA MANY 04/18/2022    CLARITYU SLIGHTLY CLOUDY 04/18/2022    SPECGRAV 1.015 04/18/2022    LEUKOCYTESUR MODERATE 04/18/2022    UROBILINOGEN 0.2 04/18/2022    BILIRUBINUR NEGATIVE 04/18/2022    BLOODU LARGE 04/18/2022    KETUA NEGATIVE 04/18/2022     Urine Cultures: No results found for: LABURIN  Blood Cultures: No results found for: BC  No results found for: BLOODCULT2  Organism: No results found for: ORG  Imaging/Diagnostics Last 24 Hours   CT ABDOMEN PELVIS W IV CONTRAST Additional Contrast? None    Result Date: 4/18/2022  EXAMINATION: CT OF THE ABDOMEN AND PELVIS WITH CONTRAST 4/18/2022 4:29 pm TECHNIQUE: CT of the abdomen and pelvis was performed with the administration of intravenous contrast. Multiplanar reformatted images are provided for review. Dose modulation, iterative reconstruction, and/or weight based adjustment of the mA/kV was utilized to reduce the radiation dose to as low as reasonably achievable. COMPARISON: PET-CT 10/08/2021. CT abdomen and pelvis 09/07/2021.  HISTORY: ORDERING SYSTEM PROVIDED HISTORY: Generalized abdominal discomfort and distention, no bowel movement in 10 days, concern for obstruction or ischemia TECHNOLOGIST PROVIDED HISTORY: Additional Contrast?->None Reason for exam:->Generalized abdominal discomfort and distention, no bowel movement in 10 days, concern for obstruction or ischemia Decision Support Exception - unselect if not a suspected or confirmed emergency medical condition->Emergency Medical Condition (MA) Reason for Exam: Generalized abdominal discomfort and distention, no bowel movement in 10 days, concern for obstruction or ischemia Additional signs and symptoms: no Relevant Medical/Surgical History: 75 ml isovue 370 used. FINDINGS: Lower Chest: Partially visualized mass in the right middle lobe. Please see the separate report from the concurrent CT chest for further details. Limited images through the lung bases demonstrate no acute process. Organs: Diffusely scattered low-density lesions are seen in the liver new from the previous exams the largest of which measures approximately 2.5 cm on axial image 22. The gallbladder is unremarkable. No biliary ductal dilatation. The pancreas, spleen, and bilateral adrenal glands are unremarkable. The bilateral kidneys and ureters are unremarkable. GI/Bowel: No evidence of acute appendicitis. Colonic diverticulosis. Questionable circumferential wall thickening of the distal sigmoid colon and rectum versus decompression. No significant adjacent stranding. The stomach and small bowel are within normal limits. No obstruction. Pelvis: A Montelongo catheter balloon is inflated in the partially decompressed urinary bladder lumen. Status post hysterectomy. No free fluid. No pelvic or inguinal lymphadenopathy. Peritoneum/Retroperitoneum: An aorto bi-iliac stent graft is in place without significant change from 09/10/2021. No extravasation identified. No retroperitoneal or mesenteric lymphadenopathy is identified. No free air or fluid is seen in the abdomen. Bones/Soft Tissues: No acute osseous or soft tissue abnormality. No suspicious lytic or blastic osseous lesion. 1. New scattered low-density lesions throughout the liver compatible with metastases. 2. Partially visualized mass in the right middle lobe. Please see the separate report from the concurrent CT chest for further details. 3. Questionable mild circumferential wall thickening of the distal sigmoid colon and rectum versus underdistention. Proctocolitis is not excluded.      CTA PULMONARY W CONTRAST    Result Date: 4/18/2022  EXAMINATION: CTA OF THE CHEST 4/18/2022 4:26 pm TECHNIQUE: CTA of the chest was performed after the administration of intravenous contrast.  Multiplanar reformatted images are provided for review. MIP images are provided for review. Dose modulation, iterative reconstruction, and/or weight based adjustment of the mA/kV was utilized to reduce the radiation dose to as low as reasonably achievable. COMPARISON: 09/07/2021 HISTORY: ORDERING SYSTEM PROVIDED HISTORY: Shortness of breath, diaphoresis, history of lung cancer, rule out PE or pneumonia TECHNOLOGIST PROVIDED HISTORY: Reason for exam:->Shortness of breath, diaphoresis, history of lung cancer, rule out PE or pneumonia Decision Support Exception - unselect if not a suspected or confirmed emergency medical condition->Emergency Medical Condition (MA) Reason for Exam: Shortness of breath, diaphoresis, history of lung cancer, rule out PE or pneumonia Additional signs and symptoms: no Relevant Medical/Surgical History: 75 ml isovue 370 used. FINDINGS: Pulmonary Arteries: Pulmonary arteries are adequately opacified for evaluation. No evidence of intraluminal filling defect to suggest pulmonary embolism. Main pulmonary artery is normal in caliber. Mediastinum: No evidence of mediastinal lymphadenopathy. The heart and pericardium demonstrate no acute abnormality. There is no acute abnormality of the thoracic aorta. Lungs/pleura: The right middle lobe solid lobulated mass has enlarged and currently measures 61 x 42 mm, previously 31 x 21 mm when remeasured similarly. No other lung mass is identified. There is no pleural effusion or pneumothorax. There is moderate upper lobe emphysema. Upper Abdomen: There are numerous liver metastases. Findings are separately reported. Soft Tissues/Bones: No acute bone or soft tissue abnormality. No pulmonary embolus or acute pulmonary abnormality. Enlarged right middle lobe mass.        Personally reviewed Lab Studies, Imaging, and discussed case with Dr. Liza Crowley PA-C  Hospitalist  4/18/2022, 5:49 PM

## 2022-04-18 NOTE — ED TRIAGE NOTES
Pt arrived to the ED via EMS with complaints of shortness of breath and rectal pain. Pt states she feels \"like there is something stuck up in her butt. \" Per EMS, pt lives with granddaughter who states she was out walking the dog and came back to pt looking diaphoretic and having difficulty breathing more than the usually. Pt has h/o lung cx and COPD.

## 2022-04-18 NOTE — ED NOTES
Fleet enema given to pt. Pt tolerated procedure well. Large amount of brown, soft stool was released from rectum.       Mayuri Magdaleno RN  04/18/22 0058

## 2022-04-18 NOTE — ED PROVIDER NOTES
Patient handed off to me by colleague pending CT chest abdomen pelvis and admission. Patient initially came in for shortness of breath and constipation. Laboratory studies have revealed urinary tract infection, elevated troponin, acute anemia, mildly elevated transaminitis and elevated lactic acid. Patient was ordered Rocephin 1 g IV. Patient did have a negative COVID test.  Patient also had some rectal disimpaction for her constipation. But was positive. CT scan of the abdomen pelvis showed some lesions of the liver compatible with metastasis, right middle lobe mass. CTA of the chest no pulmonary embolus or acute pulmonary abnormality, enlarged right middle lobe mass. Hospitalist has been consulted for admission.       BP (!) 158/99   Pulse 123   Temp 98.5 °F (36.9 °C) (Oral)   Resp 18   Ht 5' (1.524 m)   Wt 102 lb (46.3 kg)   LMP  (LMP Unknown)   SpO2 98%   BMI 19.92 kg/m²   Labs Reviewed   CBC WITH AUTO DIFFERENTIAL - Abnormal; Notable for the following components:       Result Value    WBC 12.0 (*)     RBC 2.81 (*)     Hemoglobin 9.2 (*)     Hematocrit 29.4 (*)     .6 (*)     MCH 32.7 (*)     MCHC 31.3 (*)     RDW 15.5 (*)     Myelocyte Percent 1 (*)     Bands Relative 1 (*)     Segs Relative 77.0 (*)     Lymphocytes % 17.0 (*)     All other components within normal limits   BASIC METABOLIC PANEL W/ REFLEX TO MG FOR LOW K - Abnormal; Notable for the following components:    BUN 48 (*)     CREATININE 0.4 (*)     Glucose 150 (*)     All other components within normal limits   HEPATIC FUNCTION PANEL - Abnormal; Notable for the following components:    Albumin 3.1 (*)     AST 50 (*)     ALT 51 (*)     Total Protein 5.8 (*)     All other components within normal limits   TROPONIN - Abnormal; Notable for the following components:    Troponin T 0.017 (*)     All other components within normal limits   APTT - Abnormal; Notable for the following components:    aPTT 18.6 (*)     All other components within normal limits   BLOOD GAS, VENOUS - Abnormal; Notable for the following components:    pCO2, [place] 55 (*)     Base Exc, Mixed 3.4 (*)     HCO3, Venous 30.4 (*)     All other components within normal limits   URINALYSIS WITH REFLEX TO CULTURE - Abnormal; Notable for the following components:    Clarity, UA SLIGHTLY CLOUDY (*)     Blood, Urine LARGE (*)     Nitrite Urine, Quantitative POSITIVE (*)     Leukocyte Esterase, Urine MODERATE (*)     RBC, UA 32 (*)     WBC, UA 46 (*)     Bacteria, UA MANY (*)     Mucus, UA OCCASIONAL (*)     All other components within normal limits   LACTIC ACID - Abnormal; Notable for the following components:    Lactate 2.7 (*)     All other components within normal limits   BLOOD OCCULT STOOL DIAGNOSTIC - Abnormal; Notable for the following components:    OCCULT BLOOD FECAL POSITIVE (*)     All other components within normal limits   COVID-19, RAPID   CULTURE, URINE   BRAIN NATRIURETIC PEPTIDE   LIPASE   PROTIME-INR     CT ABDOMEN PELVIS W IV CONTRAST Additional Contrast? None   Final Result   1. New scattered low-density lesions throughout the liver compatible with   metastases. 2. Partially visualized mass in the right middle lobe. Please see the   separate report from the concurrent CT chest for further details. 3. Questionable mild circumferential wall thickening of the distal sigmoid   colon and rectum versus underdistention. Proctocolitis is not excluded. CTA PULMONARY W CONTRAST   Preliminary Result   No pulmonary embolus or acute pulmonary abnormality. Enlarged right middle lobe mass.                Problem List Items Addressed This Visit     None      Visit Diagnoses     Shortness of breath    -  Primary    Acute cystitis with hematuria        Elevated troponin        Anemia, unspecified type        Transaminitis        Essential hypertension        Gastrointestinal hemorrhage, unspecified gastrointestinal hemorrhage type        Constipation, unspecified

## 2022-04-19 NOTE — ED NOTES
Report given to Rolf Disla RN. Care transferred at this time.       Tish Morrissey RN  04/18/22 2000

## 2022-04-19 NOTE — PROGRESS NOTES
Comprehensive Nutrition Assessment    Type and Reason for Visit:  Initial,Positive Nutrition Screen (Poor appetite, allergy check)    Nutrition Recommendations/Plan:   · Start Easy to [employer] per family request   · If any issues swallowing, consider speech eval  · Start frozen oral nutrition supplements and low sushma, high protein oral nutrition supplements   · Document all PO intakes   · Assist feed as needed     Nutrition Assessment:  Admitted with acute anemia, UTI. PMH: HLD, HTN, COPD, AAA, Alcohol abuse, Moderate PCM. Pt on regular diet with poor po intake/appetite. Drinks more fluids and able to tolerate eating solids. Spoke with pt's sister, would like to trial softer foods. Pt's sister confirmed egg allergy, only egg product alone, not in meds or in other foods. Weight gain since last hospital admission. Will monitor closely as high nutrition risk. Malnutrition Assessment:  Malnutrition Status: At risk for malnutrition (Comment)    Context:  Acute Illness       Estimated Daily Nutrient Needs:  Energy (kcal):  0676-9800 (22-25 kcals/kg); Weight Used for Energy Requirements:  Current     Protein (g):  45-54 (1-1.2 g/kg); Weight Used for Protein Requirements:  Ideal        Fluid (ml/day):  1300; Method Used for Fluid Requirements:  1 ml/kcal      Nutrition Related Findings:  BUN 27, Cr 0.5, Alb 2.8      Wounds:  None       Current Nutrition Therapies:    ADULT DIET;  Regular    Anthropometric Measures:  · Height: 5' (152.4 cm)  · Current Body Weight: 130 lb 1.1 oz (59 kg)   · Admission Body Weight: 130 lb (59 kg)    · Usual Body Weight: 102 lb (46.3 kg) (September 2021)     · Ideal Body Weight: 100 lbs; % Ideal Body Weight 130.1 %   · BMI: 25.4  · BMI Categories: Normal Weight (BMI 22.0 to 24.9) age over 72       Nutrition Diagnosis:   · Inadequate oral intake related to  (reduced appetite related to acute illness) as evidenced by intake 26-50%,intake 0-25%    Nutrition Interventions:   Food and/or Nutrient Delivery:  Modify Current Diet,Start Oral Nutrition Supplement  Nutrition Education/Counseling:  No recommendation at this time   Coordination of Nutrition Care:  Continue to monitor while inpatient,Coordination of Community Care    Goals: To consume at least 50% of meals and supplements       Nutrition Monitoring and Evaluation:   Behavioral-Environmental Outcomes:  None Identified   Food/Nutrient Intake Outcomes:  Diet Advancement/Tolerance,Food and Nutrient Intake,Supplement Intake  Physical Signs/Symptoms Outcomes:  Biochemical Data,Weight,GI Status,Meal Time Behavior,Chewing or Swallowing     Discharge Planning:     Too soon to determine     Electronically signed by Jeevan Carrillo RD, LD on 4/19/22 at 4:02 PM EDT    Contact: [phone]

## 2022-04-19 NOTE — PLAN OF CARE
Nutrition Problem #1: Inadequate oral intake  Intervention: Food and/or Nutrient Delivery: Modify Current Diet,Start Oral Nutrition Supplement  Nutritional Goals:  To consume at least 50% of meals and supplements

## 2022-04-19 NOTE — CARE COORDINATION
Reviewed chart and discussed in IDR, Pt is from home with hospice but not sure which agency. I called pt's Suzanne Rice. He confirmed pt is from home with [facility] and grand daughter at the primary caregiver. He states plan is to return home with hospice once pts acute issues are treated. Also requested that CM work with pt's grand daughter Jami Dixon on discharge plan. [address] and spoke with Jami Dixon at [facility] confirmed was active with them, updated on plan to return home with [facility] once medically ready.

## 2022-04-19 NOTE — PROGRESS NOTES
Physician Progress Note      Antoinette Joseph  CSN #:                  [id number]  :                       1940  ADMIT DATE:       2022 10:41 AM  DISCH DATE:  RESPONDING  PROVIDER #:        SANCHO CASSIDY          QUERY TEXT:    Pt admitted with UTI. Pt noted to have elevated WBC, tachycardia, and lactic   acidosis. If possible, please document in the progress notes and discharge   summary if you are evaluating and /or treating any of the following: The medical record reflects the following:  Risk Factors: UTI, possible proctocolitis, CA  Clinical Indicators: T 100 AX - 97.7 oral, P 91 - 125, R 20 - 16, WBC 12,   lactate 4.3, H&P reflects UTI and possible proctocolitis. Treatment: 0.9%  ml/hr, IV Rocephin, CBC, lactate. Thank you,  Odalis Campos RN, [facility]  [phone]  Options provided:  -- Sepsis, present on admission  -- Sepsis was ruled out  -- Other - I will add my own diagnosis  -- Disagree - Not applicable / Not valid  -- Disagree - Clinically unable to determine / Unknown  -- Refer to Clinical Documentation Reviewer    PROVIDER RESPONSE TEXT:    Sepsis due to acute cystitis is possible in this patient. However, other   causes of tachycardia and lactic acidosis are present. Aggressive work-up is   pursued because of CODE STATUS/hospice care status.     Query created by: Sary Almendarez on 2022 12:24 PM      Electronically signed by:  Ksenia Bennett 2022 4:02 PM

## 2022-04-19 NOTE — PROGRESS NOTES
4 Eyes Skin Assessment     NAME:  Blayne Cornell  YOB: 1940  MEDICAL RECORD NUMBER:  [id number]    The patient is being assess for  Admission    I agree that 2 RN's have performed a thorough Head to Toe Skin Assessment on the patient. ALL assessment sites listed below have been assessed. Areas assessed by both nurses:    Head, Face, Ears, Shoulders, Back, Chest, Arms, Elbows, Hands, Sacrum. Buttock, Coccyx, Ischium and Legs. Feet and Heels        Does the Patient have a Wound?  No noted wound(s)       Jim Prevention initiated:  No   Wound Care Orders initiated:  No    Pressure Injury (Stage 3,4, Unstageable, DTI, NWPT, and Complex wounds) if present place consult order under [de-identified] No    New and Established Ostomies if present place consult order under : No      Nurse 1 eSignature: Electronically signed by Brittni Blair LPN on 8/88/32 at 07:35 AM EDT    **SHARE this note so that the co-signing nurse is able to place an eSignature**    Nurse 2 eSignature: Electronically signed by Robin Dotson LPN on 2/98/60 at 7:66 AM EDT

## 2022-04-19 NOTE — PROGRESS NOTES
V2.0  [facility] [place]      Name:  Uriel Loera /Age/Sex: 1940  (78 y.o. female)   MRN & CSN:  [id number] & [id number] Encounter Date/Time: 2022 3:59 PM EDT    Location:  6295/0496-J PCP: Jono Carrillo MD       Hospital Day: 2    Assessment and Plan:   Uriel Loera is a 78 y.o. female with pmh of hypertension, CHF, COPD/bronchial asthma, lung cancer and dementia who presents with altered mental status. 1.  Altered mental status/dementia: Reported to slumped over. CTA-no PE. Troponin is positive, but aggressive work-up is not indicated because of CODE STATUS. Patient has returned back to her baseline. 2.  Acute on chronic anemia: Low TIBC and iron. Hemoglobin has dropped from 9.2-7.6. Fecal occult blood is positive. Hold Plavix for now. Continue Protonix. Monitor H&H and transfuse if needed. Again, family do not want endoscopy. 3.  Possible UTI: UA is suspicious. Continue IV Rocephin. 4.  Constipation: Given enema in the ER. Continue laxatives. 5.  Lung cancer: With possible liver metastasis based on CT abdomen. Patient is under hospice care. Chronic problems include hypertension, hyperlipidemia, CHF, COPD/bronchial asthma, peripheral vascular disease, AAA, breast cancer (status postlumpectomy) and alcohol use. DVT prophylaxis: SCD (no heparin products because of GI bleed). CODE STATUS: DNR CC (hospice care)  Disposition: We will discharge home with hospice tomorrow if hemoglobin is stable. Diet ADULT DIET; Regular   DVT Prophylaxis [] Lovenox, []  Heparin, [x] SCDs, [] Ambulation,  [] Eliquis, [] Xarelto  [] Coumadin   Code Status DNR-CC   Disposition From: Home  Expected Disposition: Home  Estimated Date of Discharge:  Tomorrow  Patient requires continued admission due to acute on chronic anemia   Surrogate Decision Maker/ POA      Subjective:     Chief Complaint: Shortness of Breath and Rectal Pain       Uriel Loera is a 78 y.o. female who presents with altered mental status and anemia. I spoke with granddaughter at bedside (on speaker phone with another Gianni Lucero): I explained current situation. They are agreeable to repeating CBC in the morning and transfuse if necessary. All questions and concerns addressed. Review of Systems:    Review of Systems    Patient cannot give any reliable history. Objective: Intake/Output Summary (Last 24 hours) at 4/19/2022 1559  Last data filed at 4/19/2022 0444  Gross per 24 hour   Intake --   Output 850 ml   Net -850 ml        Vitals:   Vitals:    04/19/22 1530   BP: 117/73   Pulse: 85   Resp: 18   Temp: 98.2 °F (36.8 °C)   SpO2:        Physical Exam:   General: Generally weak. Eyes: EOMI  ENT: neck supple  Cardiovascular: Regular rate. Respiratory: Clear to auscultation  Gastrointestinal: Soft, non tender, no palpable mass. Genitourinary: no suprapubic tenderness  Musculoskeletal: Pitting bilateral leg edema  Skin: warm, dry  Neuro: Alert. Pleasantly confused. Psych: Mood appropriate.      Medications:   Medications:    metoprolol tartrate  25 mg Oral BID    sodium chloride flush  10 mL IntraVENous 2 times per day    pantoprazole  40 mg IntraVENous BID AC    cefTRIAXone (ROCEPHIN) IV  1,000 mg IntraVENous Daily    ipratropium  2 puff Inhalation 4x daily      Infusions:    sodium chloride 1,000 mL (04/19/22 1541)    sodium chloride       PRN Meds: morphine 20MG/ML, 2.5 mg, Q3H PRN  sodium chloride flush, 10 mL, PRN  sodium chloride, , PRN  polyethylene glycol, 17 g, Daily PRN  acetaminophen, 650 mg, Q6H PRN   Or  acetaminophen, 650 mg, Q6H PRN  albuterol sulfate HFA, 2 puff, Q4H PRN        Labs      Recent Results (from the past 24 hour(s))   Lactic Acid    Collection Time: 04/18/22  7:00 PM   Result Value Ref Range    Lactate 4.3 (HH) 0.4 - 2.0 mMOL/L   Troponin    Collection Time: 04/19/22  1:10 AM   Result Value Ref Range    Troponin T 0.032 (H) <0.01 NG/ML   ANEMIA PANEL    Collection Time: 04/19/22  6:18 AM   Result Value Ref Range    WBC 10.0 4.0 - 10.5 K/CU MM    RBC 2.36 (L) 4.2 - 5.4 M/CU MM    Hemoglobin 7.6 (L) 12.5 - 16.0 GM/DL    Hematocrit 24.0 (L) 37 - 47 %    .7 (H) 78 - 100 FL    MCH 32.2 (H) 27 - 31 PG    MCHC 31.7 (L) 32.0 - 36.0 %    RDW 15.8 (H) 11.7 - 14.9 %    Platelets 922 819 - 435 K/CU MM    MPV 9.1 7.5 - 11.1 FL    Differential Type AUTOMATED DIFFERENTIAL     Segs Relative 81.6 (H) 36 - 66 %    Lymphocytes % 9.3 (L) 24 - 44 %    Monocytes % 5.4 (H) 0 - 4 %    Eosinophils % 0.5 0 - 3 %    Basophils % 0.2 0 - 1 %    Segs Absolute 8.2 K/CU MM    Lymphocytes Absolute 0.9 K/CU MM    Monocytes Absolute 0.5 K/CU MM    Eosinophils Absolute 0.1 K/CU MM    Basophils Absolute 0.0 K/CU MM    Nucleated RBC % 2.8 %    Total Nucleated RBC 0.3 K/CU MM    Total Immature Neutrophil 0.30 K/CU MM    Immature Neutrophil % 3.0 (H) 0 - 0.43 %    Folate >20.0 (H) 3.1 - 17.5 NG/ML    Iron 33 (L) 37 - 145 ug/dL    UIBC 192 110 - 370 ug/dL    TIBC 225 (L) 250 - 450 ug/dL    Transferrin % 15 10 - 44 %    Retic Ct Pct 4.8 (H) 0.2 - 2.20 %    Ferritin 232 (H) 15 - 150 NG/ML   TYPE AND SCREEN    Collection Time: 04/19/22  6:18 AM   Result Value Ref Range    ABO/Rh O POSITIVE     Antibody Screen NEGATIVE    Magnesium    Collection Time: 04/19/22  6:18 AM   Result Value Ref Range    Magnesium 1.9 1.8 - 2.4 mg/dl   Renal Function Panel    Collection Time: 04/19/22  6:18 AM   Result Value Ref Range    Sodium 137 135 - 145 MMOL/L    Potassium 3.5 3.5 - 5.1 MMOL/L    Chloride 101 99 - 110 mMol/L    CO2 25 21 - 32 MMOL/L    Anion Gap 11 4 - 16    BUN 27 (H) 6 - 23 MG/DL    CREATININE 0.5 (L) 0.6 - 1.1 MG/DL    Glucose 93 70 - 99 MG/DL    Calcium 8.3 8.3 - 10.6 MG/DL    GFR Non-African American >60 >60 mL/min/1.73m2    GFR African American >60 >60 mL/min/1.73m2    Albumin 2.8 (L) 3.4 - 5.0 GM/DL    Phosphorus 2.8 2.5 - 4.9 MG/DL   Troponin    Collection Time: 04/19/22  6:18 AM   Result Value Ref Range    Troponin T 0.039 (H) <0.01 NG/ML        Imaging/Diagnostics Last 24 Hours   CT ABDOMEN PELVIS W IV CONTRAST Additional Contrast? None    Result Date: 4/18/2022  EXAMINATION: CT OF THE ABDOMEN AND PELVIS WITH CONTRAST 4/18/2022 4:29 pm TECHNIQUE: CT of the abdomen and pelvis was performed with the administration of intravenous contrast. Multiplanar reformatted images are provided for review. Dose modulation, iterative reconstruction, and/or weight based adjustment of the mA/kV was utilized to reduce the radiation dose to as low as reasonably achievable. COMPARISON: PET-CT 10/08/2021. CT abdomen and pelvis 09/07/2021. HISTORY: ORDERING SYSTEM PROVIDED HISTORY: Generalized abdominal discomfort and distention, no bowel movement in 10 days, concern for obstruction or ischemia TECHNOLOGIST PROVIDED HISTORY: Additional Contrast?->None Reason for exam:->Generalized abdominal discomfort and distention, no bowel movement in 10 days, concern for obstruction or ischemia Decision Support Exception - unselect if not a suspected or confirmed emergency medical condition->Emergency Medical Condition (MA) Reason for Exam: Generalized abdominal discomfort and distention, no bowel movement in 10 days, concern for obstruction or ischemia Additional signs and symptoms: no Relevant Medical/Surgical History: 75 ml isovue 370 used. FINDINGS: Lower Chest: Partially visualized mass in the right middle lobe. Please see the separate report from the concurrent CT chest for further details. Limited images through the lung bases demonstrate no acute process. Organs: Diffusely scattered low-density lesions are seen in the liver new from the previous exams the largest of which measures approximately 2.5 cm on axial image 22. The gallbladder is unremarkable. No biliary ductal dilatation. The pancreas, spleen, and bilateral adrenal glands are unremarkable. The bilateral kidneys and ureters are unremarkable.  GI/Bowel: No evidence of acute appendicitis. Colonic diverticulosis. Questionable circumferential wall thickening of the distal sigmoid colon and rectum versus decompression. No significant adjacent stranding. The stomach and small bowel are within normal limits. No obstruction. Pelvis: A [place] catheter balloon is inflated in the partially decompressed urinary bladder lumen. Status post hysterectomy. No free fluid. No pelvic or inguinal lymphadenopathy. Peritoneum/Retroperitoneum: An aorto bi-iliac stent graft is in place without significant change from 09/10/2021. No extravasation identified. No retroperitoneal or mesenteric lymphadenopathy is identified. No free air or fluid is seen in the abdomen. Bones/Soft Tissues: No acute osseous or soft tissue abnormality. No suspicious lytic or blastic osseous lesion. 1. New scattered low-density lesions throughout the liver compatible with metastases. 2. Partially visualized mass in the right middle lobe. Please see the separate report from the concurrent CT chest for further details. 3. Questionable mild circumferential wall thickening of the distal sigmoid colon and rectum versus underdistention. Proctocolitis is not excluded. CTA PULMONARY W CONTRAST    Result Date: 4/18/2022  EXAMINATION: CTA OF THE CHEST 4/18/2022 4:26 pm TECHNIQUE: CTA of the chest was performed after the administration of intravenous contrast.  Multiplanar reformatted images are provided for review. MIP images are provided for review. Dose modulation, iterative reconstruction, and/or weight based adjustment of the mA/kV was utilized to reduce the radiation dose to as low as reasonably achievable.  COMPARISON: 09/07/2021 HISTORY: ORDERING SYSTEM PROVIDED HISTORY: Shortness of breath, diaphoresis, history of lung cancer, rule out PE or pneumonia TECHNOLOGIST PROVIDED HISTORY: Reason for exam:->Shortness of breath, diaphoresis, history of lung cancer, rule out PE or pneumonia Decision Support Exception - unselect if not a suspected or confirmed emergency medical condition->Emergency Medical Condition (MA) Reason for Exam: Shortness of breath, diaphoresis, history of lung cancer, rule out PE or pneumonia Additional signs and symptoms: no Relevant Medical/Surgical History: 75 ml isovue 370 used. FINDINGS: Pulmonary Arteries: Pulmonary arteries are adequately opacified for evaluation. No evidence of intraluminal filling defect to suggest pulmonary embolism. Main pulmonary artery is normal in caliber. Mediastinum: No evidence of mediastinal lymphadenopathy. The heart and pericardium demonstrate no acute abnormality. There is no acute abnormality of the thoracic aorta. Lungs/pleura: The right middle lobe solid lobulated mass has enlarged and currently measures 61 x 42 mm, previously 31 x 21 mm when remeasured similarly. No other lung mass is identified. There is no pleural effusion or pneumothorax. There is moderate upper lobe emphysema. Upper Abdomen: There are numerous liver metastases. Findings are separately reported. Soft Tissues/Bones: No acute bone or soft tissue abnormality. No pulmonary embolus or acute pulmonary abnormality. Enlarged right middle lobe mass.        Electronically signed by Angel Juarez MD on 4/19/2022 at 3:59 PM

## 2022-04-20 NOTE — PLAN OF CARE
avoided or minimized  Description: Complications related to the disease process, condition or treatment will be avoided or minimized  4/20/2022 1356 by Beata Qureshi LPN  Outcome: Progressing  4/20/2022 0052 by Renee Rivas RN  Outcome: Ongoing     Problem: Discharge Planning  Goal: Discharge to home or other facility with appropriate resources  Outcome: Progressing

## 2022-04-20 NOTE — CARE COORDINATION
Reviewed chart and spoke with Indira Molina from [facility], she met with pt's grand daughter and discussed plan of care. If family decides to seeks treatment for Anemia or other issues will need to revoke hospice. If does not plan on actively treating issues then plan will be home with [facility] hospice which pt is presently active with. CM will continue to follow.

## 2022-04-20 NOTE — PLAN OF CARE
Problem: Skin Integrity:  Goal: Will show no infection signs and symptoms  Description: Will show no infection signs and symptoms  4/20/2022 0052 by Laci Ovalle RN  Outcome: Ongoing  4/19/2022 1500 by Jesus Mccauley RN  Outcome: Ongoing  Goal: Absence of new skin breakdown  Description: Absence of new skin breakdown  4/20/2022 0052 by Laci Ovalle RN  Outcome: Ongoing  4/19/2022 1500 by Jesus Mccauley RN  Outcome: Ongoing     Problem: Falls - Risk of:  Goal: Will remain free from falls  Description: Will remain free from falls  4/20/2022 0052 by Laci Ovalle RN  Outcome: Ongoing  4/19/2022 1500 by Jesus Mccauley RN  Outcome: Ongoing  Goal: Absence of physical injury  Description: Absence of physical injury  4/20/2022 0052 by Laci Ovalle RN  Outcome: Ongoing  4/19/2022 1500 by Jesus Mccauley RN  Outcome: Ongoing     Problem: Nutrition  Goal: Optimal nutrition therapy  4/20/2022 0052 by Laci Ovalle RN  Outcome: Ongoing  4/19/2022 1603 by Boston Solano, RD, LD  Outcome: Ongoing     Problem: Sensory:  Goal: General experience of comfort will improve  Description: General experience of comfort will improve  Outcome: Ongoing     Problem: Urinary Elimination:  Goal: Signs and symptoms of infection will decrease  Description: Signs and symptoms of infection will decrease  Outcome: Ongoing  Goal: Ability to reestablish a normal urinary elimination pattern will improve - after catheter removal  Description: Ability to reestablish a normal urinary elimination pattern will improve  Outcome: Ongoing  Goal: Complications related to the disease process, condition or treatment will be avoided or minimized  Description: Complications related to the disease process, condition or treatment will be avoided or minimized  Outcome: Ongoing

## 2022-04-20 NOTE — PROGRESS NOTES
V2.0  [facility] [place]      Name:  Gareth Menchaca /Age/Sex: 1940  (78 y.o. female)   MRN & CSN:  [id number] & [id number] Encounter Date/Time: 2022 3:59 PM EDT    Location:  [address] [place] [address] PCP: Tru Lewis MD       Hospital Day: 3    Assessment and Plan:   Gareth Menchaca is a 78 y.o. female with pmh of hypertension, CHF, COPD/bronchial asthma, lung cancer and dementia who presents with altered mental status. 1.  Altered mental status/dementia: Reported to slumped over. CTA-no PE. Troponin is positive, but aggressive work-up is not indicated because of CODE STATUS. Patient has returned back to her baseline. 2.  Acute on chronic anemia: Low TIBC and iron. Hemoglobin has dropped from 9.2-7.6. Fecal occult blood is positive. Hold Plavix for now. Continue Protonix. Monitor H&H and transfuse if needed. Again, family do not want endoscopy. 3.  Possible UTI: UA is suspicious. Continue IV Rocephin. 4.  Constipation: Given enema in the ER. Continue laxatives. 5.  Lung cancer: With possible liver metastasis based on CT abdomen. Patient is under hospice care. Chronic problems include hypertension, hyperlipidemia, CHF, COPD/bronchial asthma, peripheral vascular disease, AAA, breast cancer (status postlumpectomy) and alcohol use. DVT prophylaxis: SCD (no heparin products because of GI bleed). CODE STATUS: DNR CC (hospice care)  Disposition: We will discharge home with hospice tomorrow if hemoglobin is stable. 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 Discussed with her nurse. Sister at bedside. Discussed with the granddaughter on the phone. Hemoglobin has dropped again to 7.3. Family are intending to wait until hemoglobin is stable. Review of Systems:    Review of Systems    Patient cannot give any reliable history. Objective: Intake/Output Summary (Last 24 hours) at 4/20/2022 1634  Last data filed at 4/20/2022 0904  Gross per 24 hour   Intake 50 ml   Output 450 ml   Net -400 ml        Vitals:   Vitals:    04/20/22 0830   BP: 113/78   Pulse: 87   Resp: 18   Temp: 97.8 °F (36.6 °C)   SpO2: 100%       Physical Exam:   General: Generally weak. Eyes: EOMI  ENT: neck supple  Cardiovascular: Regular rate. Respiratory: Clear to auscultation  Gastrointestinal: Soft, non tender, no palpable mass. Genitourinary: no suprapubic tenderness  Musculoskeletal: Pitting bilateral leg edema  Skin: warm, dry  Neuro: Alert. Pleasantly confused. Psych: Mood appropriate.      Medications:   Medications:    metoprolol tartrate  25 mg Oral BID    sodium chloride flush  10 mL IntraVENous 2 times per day    pantoprazole  40 mg IntraVENous BID AC    cefTRIAXone (ROCEPHIN) IV  1,000 mg IntraVENous Daily    ipratropium  2 puff Inhalation 4x daily      Infusions:    dextrose      sodium chloride Stopped (04/20/22 0004)    sodium chloride       PRN Meds: glucagon (rDNA), 1 mg, PRN  dextrose, 100 mL/hr, PRN  dextrose bolus (hypoglycemia), 125 mL, PRN   Or  dextrose bolus (hypoglycemia), 250 mL, PRN  glucose, 16 g, PRN  morphine 20MG/ML, 2.5 mg, Q3H PRN  sodium chloride flush, 10 mL, PRN  sodium chloride, , PRN  polyethylene glycol, 17 g, Daily PRN  acetaminophen, 650 mg, Q6H PRN   Or  acetaminophen, 650 mg, Q6H PRN  albuterol sulfate HFA, Time: 04/20/22  5:03 AM   Result Value Ref Range    POC Glucose 106 (H) 70 - 99 MG/DL   POCT Glucose    Collection Time: 04/20/22  7:21 AM   Result Value Ref Range    POC Glucose 80 70 - 99 MG/DL   POCT Glucose    Collection Time: 04/20/22 11:37 AM   Result Value Ref Range    POC Glucose 77 70 - 99 MG/DL        Imaging/Diagnostics Last 24 Hours   CT ABDOMEN PELVIS W IV CONTRAST Additional Contrast? None    Result Date: 4/18/2022  EXAMINATION: CT OF THE ABDOMEN AND PELVIS WITH CONTRAST 4/18/2022 4:29 pm TECHNIQUE: CT of the abdomen and pelvis was performed with the administration of intravenous contrast. Multiplanar reformatted images are provided for review. Dose modulation, iterative reconstruction, and/or weight based adjustment of the mA/kV was utilized to reduce the radiation dose to as low as reasonably achievable. COMPARISON: PET-CT 10/08/2021. CT abdomen and pelvis 09/07/2021. HISTORY: ORDERING SYSTEM PROVIDED HISTORY: Generalized abdominal discomfort and distention, no bowel movement in 10 days, concern for obstruction or ischemia TECHNOLOGIST PROVIDED HISTORY: Additional Contrast?->None Reason for exam:->Generalized abdominal discomfort and distention, no bowel movement in 10 days, concern for obstruction or ischemia Decision Support Exception - unselect if not a suspected or confirmed emergency medical condition->Emergency Medical Condition (MA) Reason for Exam: Generalized abdominal discomfort and distention, no bowel movement in 10 days, concern for obstruction or ischemia Additional signs and symptoms: no Relevant Medical/Surgical History: 75 ml isovue 370 used. FINDINGS: Lower Chest: Partially visualized mass in the right middle lobe. Please see the separate report from the concurrent CT chest for further details. Limited images through the lung bases demonstrate no acute process.  Organs: Diffusely scattered low-density lesions are seen in the liver new from the previous exams the largest of which measures approximately 2.5 cm on axial image 22. The gallbladder is unremarkable. No biliary ductal dilatation. The pancreas, spleen, and bilateral adrenal glands are unremarkable. The bilateral kidneys and ureters are unremarkable. GI/Bowel: No evidence of acute appendicitis. Colonic diverticulosis. Questionable circumferential wall thickening of the distal sigmoid colon and rectum versus decompression. No significant adjacent stranding. The stomach and small bowel are within normal limits. No obstruction. Pelvis: A [place] catheter balloon is inflated in the partially decompressed urinary bladder lumen. Status post hysterectomy. No free fluid. No pelvic or inguinal lymphadenopathy. Peritoneum/Retroperitoneum: An aorto bi-iliac stent graft is in place without significant change from 09/10/2021. No extravasation identified. No retroperitoneal or mesenteric lymphadenopathy is identified. No free air or fluid is seen in the abdomen. Bones/Soft Tissues: No acute osseous or soft tissue abnormality. No suspicious lytic or blastic osseous lesion. 1. New scattered low-density lesions throughout the liver compatible with metastases. 2. Partially visualized mass in the right middle lobe. Please see the separate report from the concurrent CT chest for further details. 3. Questionable mild circumferential wall thickening of the distal sigmoid colon and rectum versus underdistention. Proctocolitis is not excluded. CTA PULMONARY W CONTRAST    Result Date: 4/18/2022  EXAMINATION: CTA OF THE CHEST 4/18/2022 4:26 pm TECHNIQUE: CTA of the chest was performed after the administration of intravenous contrast.  Multiplanar reformatted images are provided for review. MIP images are provided for review. Dose modulation, iterative reconstruction, and/or weight based adjustment of the mA/kV was utilized to reduce the radiation dose to as low as reasonably achievable.  COMPARISON: 09/07/2021 HISTORY: ORDERING SYSTEM PROVIDED HISTORY: Shortness of breath, diaphoresis, history of lung cancer, rule out PE or pneumonia TECHNOLOGIST PROVIDED HISTORY: Reason for exam:->Shortness of breath, diaphoresis, history of lung cancer, rule out PE or pneumonia Decision Support Exception - unselect if not a suspected or confirmed emergency medical condition->Emergency Medical Condition (MA) Reason for Exam: Shortness of breath, diaphoresis, history of lung cancer, rule out PE or pneumonia Additional signs and symptoms: no Relevant Medical/Surgical History: 75 ml isovue 370 used. FINDINGS: Pulmonary Arteries: Pulmonary arteries are adequately opacified for evaluation. No evidence of intraluminal filling defect to suggest pulmonary embolism. Main pulmonary artery is normal in caliber. Mediastinum: No evidence of mediastinal lymphadenopathy. The heart and pericardium demonstrate no acute abnormality. There is no acute abnormality of the thoracic aorta. Lungs/pleura: The right middle lobe solid lobulated mass has enlarged and currently measures 61 x 42 mm, previously 31 x 21 mm when remeasured similarly. No other lung mass is identified. There is no pleural effusion or pneumothorax. There is moderate upper lobe emphysema. Upper Abdomen: There are numerous liver metastases. Findings are separately reported. Soft Tissues/Bones: No acute bone or soft tissue abnormality. No pulmonary embolus or acute pulmonary abnormality. Enlarged right middle lobe mass.        Electronically signed by Oneil Richards MD on 4/20/2022 at 4:34 PM

## 2022-04-21 NOTE — PROGRESS NOTES
Outpatient Pharmacy Progress Note for Meds-to-Beds    Total number of Prescriptions Filled: 3  The following medications were dispensed to the patient during the discharge process:  Pantoprazole  Ferrous sulfate  Metoprolol tartrate     Additional Documentation:  Medications given to nurse Huyen Valle to provide to patient due to COVID-19 precautions      Thank you for letting us serve your patients.   [facility]    [address], [address] [facility]    Phone: [phone]    Fax: [phone]

## 2022-04-21 NOTE — DISCHARGE SUMMARY
V2.0  Discharge Summary    Name:  Blayne Cornell /Age/Sex: 1940 (78 y.o. female)   Admit Date: 2022  Discharge Date: 22    MRN & CSN:  [id number] & [id number] Encounter Date and Time 22 2:52 PM EDT    Attending:  No att. providers found Discharging Provider: Gladys Alarcon MD       Hospital Course:     Brief HPI: Blayne Cornell is a 78 y.o. female who presented with altered mental status. Brief Problem Based Course:   Blayne Cornell is a 78 y.o. female with pmh of hypertension, hyperlipidemia, COPD/asthma dementia, PVD,, breast cancer, CHF, alcohol abuse, AAA, lung cancer who presents to the ER for evaluation of altered mental status episode with family. Patient's family is at bedside and states that patient \"slumped over \"while at home and became diaphoretic with difficulty breathing. Patient currently is a hospice patient due to lung cancer and is not currently being treated by an oncologist.  Hospice comes in each week and helps. She has had 3 weeks of debility and decreased appetite and has been unable to walk recently. She also has been complaining of lower abdominal pain and has not had bowel movement in the past week. She is on liquid morphine to help with pain. She did not fully lose consciousness and has improved to her baseline mentation at this time. Patient herself denies chest pain or shortness of breath. She describes \"chronic pain \"and states \"she feels like it is her time \".      She is a DNR CC CODE STATUS. Discussed with family that we are limited on what we can offer her given her CODE STATUS but we can observe her overnight and discuss discharging and coordination with hospice in the morning.     Patient was given an enema in the ER with bowel movement response. It was guaiac positive. CTA PE was negative. She also said to be anemic.     Admitted as:  Blayne Cornell is a 78 y.o. female with pmh of hypertension, CHF, COPD/bronchial asthma, lung cancer and dementia who presents with altered mental status.     1. Altered mental status/dementia: Reported to have slumped over. CTA-no PE. Troponin is positive, but aggressive work-up is not indicated because of CODE STATUS. Patient has returned back to her baseline.     2. Acute on chronic anemia: Low TIBC and iron. Hemoglobin has dropped from 9.2-7.2. Fecal occult blood is positive. Stopped Plavix for now. Continue Protonix. Hemoglobin later remained stable. Started on ferrous sulfate. Again, family do not want endoscopy. Discharged home with hospice. May consider repeating CBC in 1 week.     3. Possible UTI: UA was suspicious. Received IV Rocephin for 4 days.     4.  Constipation: Given enema in the ER. Continue laxatives.     5.  Lung cancer: With possible liver metastasis based on CT abdomen. Patient is under the care of hospice.     Chronic problems include hypertension, hyperlipidemia, CHF, COPD/bronchial asthma, peripheral vascular disease, AAA, breast cancer (status postlumpectomy) and alcohol use      The patient expressed appropriate understanding of, and agreement with the discharge recommendations, medications, and plan. Consults this admission:  IP CONSULT TO HOSPITALIST  IP CONSULT TO CASE MANAGEMENT    Discharge Diagnosis:   Acute anemia    Altered mental status  Acute on chronic anemia  Possible UTI  Constipation  History of metastatic lung cancer    Discharge Instruction:   Follow up appointments: PCP  Primary care physician: Rios Brooks MD within 2 weeks  Diet: regular diet   Activity: activity as tolerated  Disposition: Discharged to:   [x]Home, [][facility], []SNF, []Acute Rehab, [x]Hospice   Condition on discharge: Stable  Labs and Tests to be Followed up as an outpatient by PCP or Specialist: Repeat CBC in 1 week.     Discharge Medications:        Medication List      START taking these medications    ferrous sulfate 325 (65 Fe) MG tablet  Commonly known as: IRON 325  Take 1 tablet by mouth 2 times daily     metoprolol tartrate 25 MG tablet  Commonly known as: LOPRESSOR  Take 1 tablet by mouth 2 times daily     pantoprazole 40 MG tablet  Commonly known as: PROTONIX  Take 1 tablet by mouth every morning (before breakfast)        CHANGE how you take these medications    calcium carbonate-vitamin D 600-400 MG-UNIT Tabs per tab  Commonly known as: Calcium 600 + D  Twice daily by mouth  What changed:   how much to take  additional instructions     furosemide 20 MG tablet  Commonly known as: LASIX  Take 1 tablet by mouth daily  What changed: Another medication with the same name was removed. Continue taking this medication, and follow the directions you see here.         CONTINUE taking these medications    * albuterol (2.5 MG/3ML) 0.083% nebulizer solution  Commonly known as: PROVENTIL  Take 3 mLs by nebulization 4 times daily     * albuterol sulfate  (90 Base) MCG/ACT inhaler  Commonly known as: ProAir HFA  Inhale 2 puffs into the lungs every 6 hours as needed for Wheezing or Shortness of Breath     atorvastatin 80 MG tablet  Commonly known as: LIPITOR  Take 1 tablet by mouth daily     budesonide 0.5 MG/2ML nebulizer suspension  Commonly known as: Pulmicort  Take 2 mLs by nebulization 2 times daily     dexamethasone 4 MG tablet  Commonly known as: DECADRON     donepezil 10 MG tablet  Commonly known as: Aricept  Take 1 tablet by mouth nightly     Fiber 625 MG Tabs  Take 1 tablet by mouth daily     folic acid 1 MG tablet  Commonly known as: FOLVITE  Take 1 tablet by mouth daily     Incontinence Supply Disposable Misc  Change 4 times per day     ipratropium 0.02 % nebulizer solution  Commonly known as: ATROVENT  Take 2.5 mLs by nebulization 4 times daily Nebulize with albuterol     Mini Plus Nebulizer Misc  Use 4 times per day as needed     OXYGEN     potassium chloride 10 MEQ extended release tablet  Commonly known as: KLOR-CON M  Take 1 tablet by mouth daily     pyridoxine 50 MG tablet  Commonly known as: B-6  Take 1 tablet by mouth daily     Wall Grab Bar Misc  Place by the toilet         * This list has 2 medication(s) that are the same as other medications prescribed for you. Read the directions carefully, and ask your doctor or other care provider to review them with you. STOP taking these medications    amLODIPine 5 MG tablet  Commonly known as: NORVASC     clopidogrel 75 MG tablet  Commonly known as: Plavix     naltrexone 50 MG tablet  Commonly known as: DEPADE           Where to Get Your Medications      These medications were sent to [address] [place] [address] 25, [address] [facility]    Phone: [phone]   ferrous sulfate 325 (65 Fe) MG tablet  metoprolol tartrate 25 MG tablet  pantoprazole 40 MG tablet        Objective Findings at Discharge:   BP (!) 105/55   Pulse 66   Temp 97.7 °F (36.5 °C) (Axillary)   Resp 16   Ht 5' (1.524 m)   Wt 132 lb 6 oz (60 kg)   LMP  (LMP Unknown)   SpO2 100%   BMI 25.85 kg/m²     Discussed with her nurse. Granddaughter wants patient to be discharged today. Hemoglobin has dropped from 7.3 to 7.2 (practically stable). Physical Exam:   General: Generally weak. Eyes: EOMI  ENT: neck supple  Cardiovascular: Regular rate. Respiratory: Clear to auscultation  Gastrointestinal: Soft, non tender, no palpable mass. Genitourinary: no suprapubic tenderness  Musculoskeletal: Pitting bilateral leg edema  Skin: warm, dry  Neuro: Alert. Pleasantly confused. Psych: Mood appropriate. Labs and Imaging   CT ABDOMEN PELVIS W IV CONTRAST Additional Contrast? None    Result Date: 4/18/2022  EXAMINATION: CT OF THE ABDOMEN AND PELVIS WITH CONTRAST 4/18/2022 4:29 pm TECHNIQUE: CT of the abdomen and pelvis was performed with the administration of intravenous contrast. Multiplanar reformatted images are provided for review.  Dose modulation, iterative reconstruction, and/or weight based adjustment of the mA/kV was utilized to reduce the radiation dose to as low as reasonably achievable. COMPARISON: PET-CT 10/08/2021. CT abdomen and pelvis 09/07/2021. HISTORY: ORDERING SYSTEM PROVIDED HISTORY: Generalized abdominal discomfort and distention, no bowel movement in 10 days, concern for obstruction or ischemia TECHNOLOGIST PROVIDED HISTORY: Additional Contrast?->None Reason for exam:->Generalized abdominal discomfort and distention, no bowel movement in 10 days, concern for obstruction or ischemia Decision Support Exception - unselect if not a suspected or confirmed emergency medical condition->Emergency Medical Condition (MA) Reason for Exam: Generalized abdominal discomfort and distention, no bowel movement in 10 days, concern for obstruction or ischemia Additional signs and symptoms: no Relevant Medical/Surgical History: 75 ml isovue 370 used. FINDINGS: Lower Chest: Partially visualized mass in the right middle lobe. Please see the separate report from the concurrent CT chest for further details. Limited images through the lung bases demonstrate no acute process. Organs: Diffusely scattered low-density lesions are seen in the liver new from the previous exams the largest of which measures approximately 2.5 cm on axial image 22. The gallbladder is unremarkable. No biliary ductal dilatation. The pancreas, spleen, and bilateral adrenal glands are unremarkable. The bilateral kidneys and ureters are unremarkable. GI/Bowel: No evidence of acute appendicitis. Colonic diverticulosis. Questionable circumferential wall thickening of the distal sigmoid colon and rectum versus decompression. No significant adjacent stranding. The stomach and small bowel are within normal limits. No obstruction. Pelvis: A Montelongo catheter balloon is inflated in the partially decompressed urinary bladder lumen. Status post hysterectomy. No free fluid.   No pelvic or inguinal lymphadenopathy. Peritoneum/Retroperitoneum: An aorto bi-iliac stent graft is in place without significant change from 09/10/2021. No extravasation identified. No retroperitoneal or mesenteric lymphadenopathy is identified. No free air or fluid is seen in the abdomen. Bones/Soft Tissues: No acute osseous or soft tissue abnormality. No suspicious lytic or blastic osseous lesion. 1. New scattered low-density lesions throughout the liver compatible with metastases. 2. Partially visualized mass in the right middle lobe. Please see the separate report from the concurrent CT chest for further details. 3. Questionable mild circumferential wall thickening of the distal sigmoid colon and rectum versus underdistention. Proctocolitis is not excluded. CTA PULMONARY W CONTRAST    Result Date: 4/21/2022  EXAMINATION: CTA OF THE CHEST 4/18/2022 4:26 pm TECHNIQUE: CTA of the chest was performed after the administration of intravenous contrast.  Multiplanar reformatted images are provided for review. MIP images are provided for review. Dose modulation, iterative reconstruction, and/or weight based adjustment of the mA/kV was utilized to reduce the radiation dose to as low as reasonably achievable. COMPARISON: 09/07/2021 HISTORY: ORDERING SYSTEM PROVIDED HISTORY: Shortness of breath, diaphoresis, history of lung cancer, rule out PE or pneumonia TECHNOLOGIST PROVIDED HISTORY: Reason for exam:->Shortness of breath, diaphoresis, history of lung cancer, rule out PE or pneumonia Decision Support Exception - unselect if not a suspected or confirmed emergency medical condition->Emergency Medical Condition (MA) Reason for Exam: Shortness of breath, diaphoresis, history of lung cancer, rule out PE or pneumonia Additional signs and symptoms: no Relevant Medical/Surgical History: 75 ml isovue 370 used. FINDINGS: Pulmonary Arteries: Pulmonary arteries are adequately opacified for evaluation.   No evidence of intraluminal filling defect to suggest pulmonary embolism. Main pulmonary artery is normal in caliber. Mediastinum: No evidence of mediastinal lymphadenopathy. The heart and pericardium demonstrate no acute abnormality. There is no acute abnormality of the thoracic aorta. Lungs/pleura: The right middle lobe solid lobulated mass has enlarged and currently measures 61 x 42 mm, previously 31 x 21 mm when remeasured similarly. No other lung mass is identified. There is no pleural effusion or pneumothorax. There is moderate upper lobe emphysema. Upper Abdomen: There are numerous liver metastases. Findings are separately reported. Soft Tissues/Bones: No acute bone or soft tissue abnormality. No pulmonary embolus or acute pulmonary abnormality. Enlarged right middle lobe mass. CBC:   Recent Labs     04/19/22  0618 04/20/22  0202 04/21/22  0722   WBC 10.0 8.4 5.4   HGB 7.6* 7.3* 7.2*    148 160     BMP:    Recent Labs     04/19/22  0618 04/20/22  0202 04/21/22  0722    134* 134*   K 3.5 3.3* 3.1*    100 100   CO2 25 19* 23   BUN 27* 17 8   CREATININE 0.5* 0.4* 0.3*   GLUCOSE 93 46* 88     Hepatic:   Recent Labs     04/20/22  0202 04/21/22  0722   AST 57* 59*   ALT 41* 42*   BILITOT 0.4 0.3   ALKPHOS 86 93     Lipids:   Lab Results   Component Value Date    CHOL 208 09/08/2021    CHOL 220 03/23/2015     09/08/2021     04/30/2012    TRIG 43 09/08/2021     Hemoglobin A1C: No results found for: LABA1C  TSH: No results found for: TSH  Troponin:   Lab Results   Component Value Date    TROPONINT 0.039 04/19/2022    TROPONINT 0.032 04/19/2022    TROPONINT 0.017 04/18/2022     Lactic Acid: No results for input(s): LACTA in the last 72 hours. BNP: No results for input(s): PROBNP in the last 72 hours.   UA:  Lab Results   Component Value Date    NITRU POSITIVE 04/18/2022    COLORU YELLOW 04/18/2022    WBCUA 46 04/18/2022    RBCUA 32 04/18/2022    MUCUS OCCASIONAL 04/18/2022    TRICHOMONAS NONE SEEN

## 2022-04-21 NOTE — DISCHARGE INSTR - COC
Continuity of Care Form    Patient Name: Rojelio Almendarez   :  1940  MRN:  [id number]    Admit date:  2022  Discharge date:  ***    Code Status Order: DNR-CC   Advance Directives:      Admitting Physician:  Memory Litten, MD  PCP: Hilario Kendrick MD    Discharging Nurse: Huyen Valle, [facility] Unit/Room#: 4109/4109-A  Discharging Unit Phone Number: [phone]    Emergency Contact:   Extended Emergency Contact Information  Primary Emergency Contact: Samra Morales, [address] [facility] Phone: [phone]  Relation: Grandchild  Secondary Emergency Contact: Samra Morales, [address] Phone: [phone]  Mobile Phone: [phone]  Relation: Grandchild    Past Surgical History:  Past Surgical History:   Procedure Laterality Date    ABDOMEN SURGERY      \"Surgery For Adhesions\"    ABDOMINAL AORTIC ANEURYSM REPAIR, ENDOVASCULAR  2012    BREAST BIOPSY  2013    benign    BREAST LUMPECTOMY      Lumpectomy Right Breast For Cancer, Had Radiation  Treatments    [place]    [facility] [place]    DENTAL SURGERY      Teeth Extracted In Past    FEMORAL BYPASS      \"Both Legs\"    FRACTURE SURGERY  Early     Broken Left Arm    HYSTERECTOMY, TOTAL ABDOMINAL         Immunization History:   Immunization History   Administered Date(s) Administered    Influenza A (O2B3-77) Vaccine IM 2009    Influenza A (Y2F0-65) Vaccine PF IM 2009    Influenza Virus Vaccine 2007, 10/27/2008, 2010, 2011, 10/31/2013, 2014, 10/31/2016    Influenza, High Dose (Fluzone 65 yrs and older) 10/10/2012, 10/31/2013, 2014, 10/06/2015, 10/31/2016, 2017, 10/02/2018    Influenza, Quadv, adjuvanted, 65 yrs +, IM, PF (Fluad) 2020, 2021    Influenza, Triv, inactivated, subunit, adjuvanted, IM (Fluad 65 yrs and older) 10/08/2019    Pneumococcal Conjugate 13-valent (Ihjywrr10) 2016    Pneumococcal Polysaccharide (Loxcpftxi59) 10/31/2006    Tdap (Boostrix, Adacel) 12/10/2008       Active Problems:  Patient Active Problem List   Diagnosis Code    Peripheral vascular disease ([facility].) I73.9    Hyperlipidemia E78.5    Alcohol abuse F10.10    Moderate COPD (chronic obstructive pulmonary disease) ([facility]) J44.9    Osteoporosis M81.0    History of breast cancer Z85.3    Cachectic ([facility] R64    Bilateral hearing loss H91.93    Functional urinary incontinence R39.81    Unsteady gait R26.81    SOB (shortness of breath) R06.02    Moderate malnutrition ([facility]) E44.0    Acute on chronic congestive heart failure ([facility]) I50.9    Acute anemia D64.9       Isolation/Infection:   Isolation            No Isolation          Patient Infection Status       Infection Onset Added Last Indicated Last Indicated By Review Planned Expiration Resolved Resolved By    None active    Resolved    COVID-19 (Rule Out) 04/18/22 04/18/22 04/18/22 COVID-19, Rapid (Ordered)   04/18/22 Rule-Out Test Resulted    COVID-19 (Rule Out) 10/20/21 10/20/21 10/20/21 COVID-19 (Ordered)   10/21/21 Rule-Out Test Resulted    COVID-19 (Rule Out) 09/07/21 09/07/21 09/07/21 COVID-19, Rapid (Ordered)   09/07/21 Rule-Out Test Resulted    COVID-19 (Rule Out) 08/16/21 08/16/21 08/16/21 COVID-19, Rapid (Ordered)   08/16/21 Rule-Out Test Resulted            Nurse Assessment:  Last Vital Signs: /67   Pulse 88   Temp 98.1 °F (36.7 °C) (Oral)   Resp 16   Ht 5' (1.524 m)   Wt 132 lb 6 oz (60 kg)   LMP  (LMP Unknown)   SpO2 97%   BMI 25.85 kg/m²     Last documented pain score (0-10 scale): Pain Level: 0  Last Weight:   Wt Readings from Last 1 Encounters:   04/20/22 132 lb 6 oz (60 kg)     Mental Status:  oriented and alert    IV Access:  - None    Nursing Mobility/ADLs:  Walking   Assisted  Transfer  Assisted  Bathing  Assisted  Dressing  Assisted  Toileting  Assisted  Feeding  Assisted  Med Admin  Assisted  Med Delivery   whole    Wound Care Documentation and Therapy:        Elimination:  Continence:    Bowel: Yes  Bladder: Montelongo Urinary Catheter: Insertion Date: inserted prior to arrival     Colostomy/Ileostomy/Ileal Conduit: {YES / GO:63551}       Date of Last BM: ***    Intake/Output Summary (Last 24 hours) at 4/21/2022 1147  Last data filed at 4/21/2022 0921  Gross per 24 hour   Intake 120 ml   Output 400 ml   Net -280 ml     I/O last 3 completed shifts: In: 48 [P.O.:50]  Out: 400 [Urine:400]    Safety Concerns: At Risk for Falls    Impairments/Disabilities:      None    Nutrition Therapy:  Current Nutrition Therapy:   - Oral Diet:  Adult Diet, easy to chew     Routes of Feeding: Oral  Liquids: Thin Liquids  Daily Fluid Restriction: no  Last Modified Barium Swallow with Video (Video Swallowing Test): not done    Treatments at the Time of Hospital Discharge:   Respiratory Treatments: ***  Oxygen Therapy:  is not on home oxygen therapy.   Ventilator:    - No ventilator support    Rehab Therapies: {THERAPEUTIC INTERVENTION:9209536167}  Weight Bearing Status/Restrictions: No weight bearing restrictions  Other Medical Equipment (for information only, NOT a DME order):  {EQUIPMENT:526241462}  Other Treatments: ***    Patient's personal belongings (please select all that are sent with patient):  {[facility] DME Belongings:932104471}    RN SIGNATURE:  {Esignature:974612882}    CASE MANAGEMENT/SOCIAL WORK SECTION    Inpatient Status Date: ***    Readmission Risk Assessment Score:  Readmission Risk              Risk of Unplanned Readmission:  20           Discharging to Facility/ Agency   Name:   Address:  Phone:  Fax:    Dialysis Facility (if applicable)   Name:  Address:  Dialysis Schedule:  Phone:  Fax:    / signature: {Esignature:381979847}    PHYSICIAN SECTION    Prognosis: {Prognosis:7399872301}    Condition at Discharge: [facility] Patient Condition:524037255}    Rehab Potential (if transferring to Rehab): {Prognosis:1367463075}    Recommended Labs or Other Treatments After Discharge: ***    Physician Certification: I certify the above information and transfer of Cassi Barker  is necessary for the continuing treatment of the diagnosis listed and that she requires {Admit to Appropriate Level of Care:41611} for {GREATER/LESS:599971913} 30 days.      Update Admission H&P: {CHP DME Changes in PSLIV:412217857}    PHYSICIAN SIGNATURE:  {Esignature:292216604}

## 2022-04-21 NOTE — PLAN OF CARE
Problem: Skin Integrity:  Goal: Will show no infection signs and symptoms  Description: Will show no infection signs and symptoms  4/21/2022 0132 by Casey Regalado RN  Outcome: Progressing  4/20/2022 1356 by Yary Tomlinson LPN  Outcome: Progressing  Goal: Absence of new skin breakdown  Description: Absence of new skin breakdown  4/21/2022 0132 by Casey Regalado RN  Outcome: Progressing  4/20/2022 1356 by Yary Tomlinson LPN  Outcome: Progressing     Problem: Falls - Risk of:  Goal: Will remain free from falls  Description: Will remain free from falls  4/21/2022 0132 by Casey Regalado RN  Outcome: Progressing  4/20/2022 1356 by Yary Tomlinson LPN  Outcome: Progressing  Goal: Absence of physical injury  Description: Absence of physical injury  4/21/2022 0132 by Casey Regalado RN  Outcome: Progressing  4/20/2022 1356 by Yary Tomlinson LPN  Outcome: Progressing     Problem: Nutrition  Goal: Optimal nutrition therapy  4/21/2022 0132 by Casey Reaglado RN  Outcome: Progressing  4/20/2022 1356 by Yary Tomlinson LPN  Outcome: Progressing     Problem: Sensory:  Goal: General experience of comfort will improve  Description: General experience of comfort will improve  4/21/2022 0132 by Casey Regalado RN  Outcome: Progressing  4/20/2022 1356 by Yary Tomlinson LPN  Outcome: Progressing     Problem: Urinary Elimination:  Goal: Signs and symptoms of infection will decrease  Description: Signs and symptoms of infection will decrease  4/21/2022 0132 by Casey Regalado RN  Outcome: Progressing  4/20/2022 1356 by Yary Tomlinson LPN  Outcome: Progressing  Goal: Ability to reestablish a normal urinary elimination pattern will improve - after catheter removal  Description: Ability to reestablish a normal urinary elimination pattern will improve  4/21/2022 0132 by Casey Regalado RN  Outcome: Progressing  4/20/2022 1356 by Yary Tomlinson LPN  Outcome: Progressing  Goal: Complications related to the disease process, condition or treatment will be avoided or minimized  Description: Complications related to the disease process, condition or treatment will be avoided or minimized  4/21/2022 0132 by Julia Vicente RN  Outcome: Progressing  4/20/2022 1356 by Yifan Vanegas LPN  Outcome: Progressing     Problem: Discharge Planning  Goal: Discharge to home or other facility with appropriate resources  4/21/2022 0132 by Julia Vicente RN  Outcome: Progressing  4/20/2022 1356 by Yifan Vanegas LPN  Outcome: Progressing

## 2022-05-03 PROBLEM — Z51.5 HOSPICE CARE PATIENT: Status: ACTIVE | Noted: 2022-01-01

## 2022-05-03 NOTE — TELEPHONE ENCOUNTER
I am getting hospice paperwork to sign off on for patient. I noticed that there are a lot of chronic medications on there that are not appropriate at this time. Not for hospice patient. I am removing them from the medication list.  See medication list for details    I did speak to patient's granddaughter. I explained what I was doing and she was fully understanding. We will keep her on any medication that is necessary for her to breathe comfortably and have comfortable bowel movements. I will continue her on her Protonix medication. Told the granddaughter she could call back with any questions.

## 2023-04-25 NOTE — ED NOTES
Paged mamie @ [phone] NURA Lam  09/30/20 [phone] [TextBox_4] : HAD COVID DEC 26 WAS VERY SICK SP STEROIDS/ ABX/ PAXLOVID, HAD CXR DONE\par NON SMOKER\par COUGH BETTER\par CHEST CT NEW NODULE\par HX OF LYMPHOMA

## 2024-01-30 NOTE — ADDENDUM NOTE
Addended by: Adam Patel on: 7/9/2020 06:13 PM     Modules accepted: Orders
No Vaccines Administered.